# Patient Record
Sex: FEMALE | Race: WHITE
[De-identification: names, ages, dates, MRNs, and addresses within clinical notes are randomized per-mention and may not be internally consistent; named-entity substitution may affect disease eponyms.]

---

## 2017-07-25 ENCOUNTER — HOSPITAL ENCOUNTER (OUTPATIENT)
Dept: HOSPITAL 61 - SURG | Age: 37
Discharge: HOME | End: 2017-07-25
Attending: SURGERY
Payer: COMMERCIAL

## 2017-07-25 VITALS — SYSTOLIC BLOOD PRESSURE: 165 MMHG | DIASTOLIC BLOOD PRESSURE: 97 MMHG

## 2017-07-25 DIAGNOSIS — L02.415: Primary | ICD-10-CM

## 2017-07-25 DIAGNOSIS — E11.9: ICD-10-CM

## 2017-07-25 DIAGNOSIS — M19.90: ICD-10-CM

## 2017-07-25 DIAGNOSIS — I10: ICD-10-CM

## 2017-07-25 DIAGNOSIS — E66.9: ICD-10-CM

## 2017-07-25 DIAGNOSIS — Z86.39: ICD-10-CM

## 2017-07-25 LAB
NEG OBC UR: (no result)
POS OBC UR: (no result)

## 2017-07-25 PROCEDURE — 27301 DRAIN THIGH/KNEE LESION: CPT

## 2017-07-25 PROCEDURE — S0028 INJECTION, FAMOTIDINE, 20 MG: HCPCS

## 2017-07-25 PROCEDURE — 81025 URINE PREGNANCY TEST: CPT

## 2017-07-25 PROCEDURE — 87205 SMEAR GRAM STAIN: CPT

## 2017-07-25 PROCEDURE — 82962 GLUCOSE BLOOD TEST: CPT

## 2017-07-25 NOTE — DISCH
DISCHARGE INSTRUCTIONS


Condition on Discharge


Condition on Discharge:  Stable





Activity After Discharge


Activity Instructions for Disc:  Activity as tolerated


Other activity instructions:  Remove packing in 24 hours





Diet after Discharge


Diet after Discharge:  Regular





Contacting the DRVarghese after DC


Call your doctor for:  If your condition worsens





Follow-Up


Follow up with:  Dr Larsen in 1 week











YISSEL LARSEN MD Jul 25, 2017 16:12

## 2017-07-25 NOTE — PDOC
BRIEF OPERATIVE NOTE


Date:  Jul 25, 2017


Pre-Op Diagnosis


Right thigh abscess


Post-Op Diagnosis


Same


Procedure Performed


I&D


Surgeon


Francisco


Anesthesia Type:  General


Blood Loss


30ml


Specimens Obtained


Cultures


Findings


as above


Complications


None











YISSEL LARSEN MD Jul 25, 2017 16:11

## 2017-07-26 NOTE — OP
DATE OF SURGERY:  07/25/2017



PREOPERATIVE DIAGNOSIS:  Right thigh abscess.



POSTOPERATIVE DIAGNOSIS:  Right thigh abscess.



PROCEDURE:  Incision and drainage of right abscess.



SURGEON:  Dannie Larsen M.D.



INDICATIONS:  The patient is a 37-year-old morbidly obese female with an abscess

of her right thigh.  Procedure of incision and drainage was explained to the

patient in detail.  Risks, benefits were also discussed including bleeding,

infection.  Alternatives of this procedure were also discussed with the patient

who seemed to understand and gave verbal and written consent to have the

procedure performed.



DESCRIPTION OF PROCEDURE:  The patient was taken to the operating room and

placed in the supine position.  General anesthesia was initiated.  Once the

patient was asleep and intubated, she was placed in low lithotomy and her groin

and right thigh were prepped and draped in usual sterile fashion using Betadine

scrub and solution.  An area over the abscess was incised with a 10 blade

scalpel, was carried down through subcutaneous tissue down to the abscess

cavity.  There was only about 10 mL of purulent material expressed.  It was not

very deep.  There was one little area that traveled about 2 cm deeper.  This all

was irrigated with 500 mL of normal saline and then packed with half inch

iodoform Nu Gauze.  The patient was awakened, extubated in the operating room,

taken to recovery in stable condition.  All sponge, instrument counts listed as

correct.  Estimated blood loss 30 mL.

 



______________________________

DANNIE LARSEN MD



DR:  CRISTINO/poli  JOB#:  [id number] / [id number]

DD:  07/25/2017 16:10  DT:  07/25/2017 22:19

## 2017-07-28 ENCOUNTER — HOSPITAL ENCOUNTER (INPATIENT)
Dept: HOSPITAL 61 - ER | Age: 37
LOS: 6 days | Discharge: HOME HEALTH | DRG: 854 | End: 2017-08-03
Attending: INTERNAL MEDICINE | Admitting: INTERNAL MEDICINE
Payer: COMMERCIAL

## 2017-07-28 VITALS — SYSTOLIC BLOOD PRESSURE: 144 MMHG | DIASTOLIC BLOOD PRESSURE: 84 MMHG

## 2017-07-28 VITALS — SYSTOLIC BLOOD PRESSURE: 139 MMHG | DIASTOLIC BLOOD PRESSURE: 80 MMHG

## 2017-07-28 VITALS — BODY MASS INDEX: 50.02 KG/M2 | WEIGHT: 293 LBS | HEIGHT: 64 IN

## 2017-07-28 VITALS — DIASTOLIC BLOOD PRESSURE: 80 MMHG | SYSTOLIC BLOOD PRESSURE: 139 MMHG

## 2017-07-28 DIAGNOSIS — E66.01: ICD-10-CM

## 2017-07-28 DIAGNOSIS — E87.6: ICD-10-CM

## 2017-07-28 DIAGNOSIS — L02.214: ICD-10-CM

## 2017-07-28 DIAGNOSIS — I10: ICD-10-CM

## 2017-07-28 DIAGNOSIS — N93.8: ICD-10-CM

## 2017-07-28 DIAGNOSIS — A41.9: Primary | ICD-10-CM

## 2017-07-28 DIAGNOSIS — M19.90: ICD-10-CM

## 2017-07-28 DIAGNOSIS — E11.65: ICD-10-CM

## 2017-07-28 DIAGNOSIS — Z72.89: ICD-10-CM

## 2017-07-28 DIAGNOSIS — Z83.3: ICD-10-CM

## 2017-07-28 DIAGNOSIS — E87.1: ICD-10-CM

## 2017-07-28 LAB
ANION GAP SERPL CALC-SCNC: 8 MMOL/L (ref 6–14)
BASOPHILS # BLD AUTO: 0.2 X10^3/UL (ref 0–0.2)
BASOPHILS NFR BLD: 1 % (ref 0–3)
BUN SERPL-MCNC: 11 MG/DL (ref 7–20)
CALCIUM SERPL-MCNC: 8.6 MG/DL (ref 8.5–10.1)
CHLORIDE SERPL-SCNC: 100 MMOL/L (ref 98–107)
CO2 SERPL-SCNC: 26 MMOL/L (ref 21–32)
CREAT SERPL-MCNC: 0.9 MG/DL (ref 0.6–1)
CRP SERPL-MCNC: 84.5 MG/L (ref 0–3.3)
EOSINOPHIL NFR BLD: 2 % (ref 0–3)
ERYTHROCYTE [DISTWIDTH] IN BLOOD BY AUTOMATED COUNT: 15.3 % (ref 11.5–14.5)
GFR SERPLBLD BASED ON 1.73 SQ M-ARVRAT: 70.5 ML/MIN
GLUCOSE SERPL-MCNC: 369 MG/DL (ref 70–99)
HCT VFR BLD CALC: 32.3 % (ref 36–47)
HGB BLD-MCNC: 10.3 G/DL (ref 12–15.5)
LYMPHOCYTES # BLD: 4 X10^3/UL (ref 1–4.8)
LYMPHOCYTES NFR BLD AUTO: 27 % (ref 24–48)
MCH RBC QN AUTO: 25 PG (ref 25–35)
MCHC RBC AUTO-ENTMCNC: 32 G/DL (ref 31–37)
MCV RBC AUTO: 80 FL (ref 79–100)
MONOCYTES NFR BLD: 6 % (ref 0–9)
NEUTROPHILS NFR BLD AUTO: 64 % (ref 31–73)
PLATELET # BLD AUTO: 418 X10^3/UL (ref 140–400)
POTASSIUM SERPL-SCNC: 3.9 MMOL/L (ref 3.5–5.1)
RBC # BLD AUTO: 4.04 X10^6/UL (ref 3.5–5.4)
SODIUM SERPL-SCNC: 134 MMOL/L (ref 136–145)
WBC # BLD AUTO: 14.7 X10^3/UL (ref 4–11)

## 2017-07-28 PROCEDURE — 87071 CULTURE AEROBIC QUANT OTHER: CPT

## 2017-07-28 PROCEDURE — 96365 THER/PROPH/DIAG IV INF INIT: CPT

## 2017-07-28 PROCEDURE — 87075 CULTR BACTERIA EXCEPT BLOOD: CPT

## 2017-07-28 PROCEDURE — 94250: CPT

## 2017-07-28 PROCEDURE — 87205 SMEAR GRAM STAIN: CPT

## 2017-07-28 PROCEDURE — 88304 TISSUE EXAM BY PATHOLOGIST: CPT

## 2017-07-28 PROCEDURE — 85027 COMPLETE CBC AUTOMATED: CPT

## 2017-07-28 PROCEDURE — 76856 US EXAM PELVIC COMPLETE: CPT

## 2017-07-28 PROCEDURE — 85651 RBC SED RATE NONAUTOMATED: CPT

## 2017-07-28 PROCEDURE — 94640 AIRWAY INHALATION TREATMENT: CPT

## 2017-07-28 PROCEDURE — 74177 CT ABD & PELVIS W/CONTRAST: CPT

## 2017-07-28 PROCEDURE — 82962 GLUCOSE BLOOD TEST: CPT

## 2017-07-28 PROCEDURE — 36415 COLL VENOUS BLD VENIPUNCTURE: CPT

## 2017-07-28 PROCEDURE — 80048 BASIC METABOLIC PNL TOTAL CA: CPT

## 2017-07-28 PROCEDURE — 96361 HYDRATE IV INFUSION ADD-ON: CPT

## 2017-07-28 PROCEDURE — 86140 C-REACTIVE PROTEIN: CPT

## 2017-07-28 PROCEDURE — 83036 HEMOGLOBIN GLYCOSYLATED A1C: CPT

## 2017-07-28 RX ADMIN — VANCOMYCIN HYDROCHLORIDE PRN EACH: 1 INJECTION, POWDER, LYOPHILIZED, FOR SOLUTION INTRAVENOUS at 21:37

## 2017-07-28 RX ADMIN — BACITRACIN SCH MLS/HR: 5000 INJECTION, POWDER, FOR SOLUTION INTRAMUSCULAR at 20:44

## 2017-07-28 NOTE — PHYS DOC
Past Medical History


Past Medical History:  Diabetes-Type II, Hypertension


Past Surgical History:  Other


Additional Past Surgical Histo:  unknown "female part" surgery; I&D


Alcohol Use:  Occasionally


Drug Use:  None





Adult General


Chief Complaint


Chief Complaint:  ABSCESS





HPI


HPI


37-year-old female presenting to the emergency department with a abscess with 

drainage in the right groin. She recently had Dr. Singh do an incision and 

drainage on the patient with packing which was recently removed in a different 

abscess in a similar location. Intermittent throbbing and without alleviating 

or exacerbating factors.





Review of systems is negative for chest pain shortness of breath fevers chills 

nausea vomiting. All other review of systems is negative unless otherwise noted 

in history of present illness.





ED course: 37-year-old female presenting to the emergency department with 

development of an additional abscess near recently drained abscess that was 

extremely large on physical examination. Given the depth of the abscess and the 

size of the wound, I deferred drainage to our surgeon who I placed 

consultation. The patient also got antibiotics and was subsequent admitted the 

hospital for further evaluation workup and care.





Review of Systems


Review of Systems


SEE ABOVE.





Current Medications


Current Medications





Current Medications








 Medications


  (Trade)  Dose


 Ordered  Sig/Kavitha  Start Time


 Stop Time Status Last Admin


Dose Admin


 


 Morphine Sulfate  2 mg  PRN Q2HR  PRN  7/28/17 20:15


 7/29/17 20:14   


 


 


 Ondansetron HCl


  (Zofran)  4 mg  PRN Q8HRS  PRN  7/28/17 20:15


 7/29/17 20:14   


 


 


 Sodium Chloride  1,000 ml @ 


 100 mls/hr  Q10H  7/28/17 20:13


 7/29/17 20:12   


 


 


 Vancomycin HCl


  (Vanco Per


 Pharmacy)  1 each  PRN DAILY  PRN  7/28/17 20:15


   UNV  


 


 


 Vancomycin HCl 2


 gm/Sodium Chloride  500 ml @ 


 250 mls/hr  1X  ONCE  7/28/17 21:00


 7/28/17 22:59   


 











Allergies


Allergies





Allergies








Coded Allergies Type Severity Reaction Last Updated Verified


 


  No Known Drug Allergies    7/25/17 No











Physical Exam


Physical Exam


SEE ABOVE





Constitutional: Well developed, well nourished, no acute distress, non-toxic 

appearance. []


HENT: Normocephalic, atraumatic, bilateral external ears normal, oropharynx 

moist, no oral exudates, nose normal. []


Eyes: PERRLA, EOMI, conjunctiva normal, no discharge. [] 


Neck: Normal range of motion, no tenderness, supple, no stridor. [] 


Cardiovascular:Heart rate regular rhythm, no murmur []


Lungs & Thorax:  Bilateral breath sounds clear to auscultation []


Abdomen: Bowel sounds normal, soft, no tenderness, no masses, no pulsatile 

masses. [] 


Skin: SEE ABOVE. Draining wound in the right groin with large fluctuant mass.


Back: No tenderness, no CVA tenderness. [] 


Extremities: No tenderness, no cyanosis, no clubbing, ROM intact, no edema. [] 


Neurologic: Alert and oriented X 3, normal motor function, normal sensory 

function, no focal deficits noted. []


Psychologic: Affect normal, judgement normal, mood normal. []





Current Patient Data


Vital Signs





 Vital Signs








  Date Time  Temp Pulse Resp B/P (MAP) Pulse Ox O2 Delivery O2 Flow Rate FiO2


 


7/28/17 18:57 98.5 109 20 158/92 (114) 97 Room Air  





 98.5       








Lab Values





 Laboratory Tests








Test


  7/28/17


19:15


 


White Blood Count


  14.7 x10^3/uL


(4.0-11.0)  H


 


Red Blood Count


  4.04 x10^6/uL


(3.50-5.40)


 


Hemoglobin


  10.3 g/dL


(12.0-15.5)  L


 


Hematocrit


  32.3 %


(36.0-47.0)  L


 


Mean Corpuscular Volume


  80 fL ()


 


 


Mean Corpuscular Hemoglobin 25 pg (25-35)  


 


Mean Corpuscular Hemoglobin


Concent 32 g/dL


(31-37)


 


Red Cell Distribution Width


  15.3 %


(11.5-14.5)  H


 


Platelet Count


  418 x10^3/uL


(140-400)  H


 


Neutrophils (%) (Auto) 64 % (31-73)  


 


Lymphocytes (%) (Auto) 27 % (24-48)  


 


Monocytes (%) (Auto) 6 % (0-9)  


 


Eosinophils (%) (Auto) 2 % (0-3)  


 


Basophils (%) (Auto) 1 % (0-3)  


 


Neutrophils # (Auto)


  9.4 x10^3uL


(1.8-7.7)  H


 


Lymphocytes # (Auto)


  4.0 x10^3/uL


(1.0-4.8)


 


Monocytes # (Auto)


  0.8 x10^3/uL


(0.0-1.1)


 


Eosinophils # (Auto)


  0.3 x10^3/uL


(0.0-0.7)


 


Basophils # (Auto)


  0.2 x10^3/uL


(0.0-0.2)


 


Sodium Level


  134 mmol/L


(136-145)  L


 


Potassium Level


  3.9 mmol/L


(3.5-5.1)


 


Chloride Level


  100 mmol/L


()


 


Carbon Dioxide Level


  26 mmol/L


(21-32)


 


Anion Gap 8 (6-14)  


 


Blood Urea Nitrogen


  11 mg/dL


(7-20)


 


Creatinine


  0.9 mg/dL


(0.6-1.0)


 


Estimated GFR


(Cockcroft-Gault) 70.5  


 


 


Glucose Level


  369 mg/dL


(70-99)  H


 


Calcium Level


  8.6 mg/dL


(8.5-10.1)


 


C-Reactive Protein,


Quantitative 84.5 mg/L


(0-3.3)  H





 Laboratory Tests


7/28/17 19:15








 Laboratory Tests


7/28/17 19:15














EKG


EKG


[]





Radiology/Procedures


Radiology/Procedures


[]





Course & Med Decision Making


Course & Med Decision Making


Pertinent Labs and Imaging studies reviewed. (See chart for details)





[]





Dragon Disclaimer


Dragon Disclaimer


This electronic medical record was generated, in whole or in part, using a 

voice recognition dictation system.





Departure


Departure


Impression:  


 Primary Impression:  


 Abscess of right thigh


Disposition:  09 ADMITTED AS INPATIENT


Admitting Physician:  Lisa Coffey


Condition:  STABLE


Referrals:  


TED HERRING (PCP)











JACKELYN BENJAMIN MD Jul 28, 2017 20:42

## 2017-07-28 NOTE — HP
ADMIT DATE:  07/28/2017



CHIEF COMPLAINT:  Thigh abscess.



HISTORY OF PRESENT ILLNESS:  The patient is a pleasant 37-year-old obese lady,

who has a thigh abscess.  She actually had drainage few days ago.  Now, it is a

little more proximal to larger ____ size.  I have discussed the case with the ER

physician.  We are going to start IV antibiotics and consult General Surgery.



PAST MEDICAL HISTORY:  Hypertension, diabetes, obesity, I and D of the previous

thigh abscess some type of "female surgery."



ALLERGIES:  None.



FAMILY HISTORY:  Diabetes.



SOCIAL HISTORY:  She does not drink, smoke or take drugs.



MEDICATIONS:  Reviewed, please refer to the MRAD.



REVIEW OF SYSTEMS:

GENERAL:  No history of weight change, weakness or fevers.

SKIN:  No bruising, hair changes or rashes.  The patient complains of left thigh

abscess.

EYES:  No blurred, double or loss of vision.

NOSE AND THROAT:  No history of nosebleeds, hoarseness or sore throat.

HEART:  No history of palpitations, chest pain or shortness of breath on

exertion.

LUNGS:  Denies cough, hemoptysis, wheezing or shortness of breath.

GASTROINTESTINAL:  Denies changes in appetite, nausea, vomiting, diarrhea or

constipation.

GENITOURINARY:  No history of frequency, urgency, hesitancy or nocturia.

NEUROLOGIC:  Denies history of numbness, tingling, tremor or weakness.

PSYCHIATRIC:  No history of panic, anxiety or depression.

ENDOCRINE:  No history of heat or cold intolerance, polyuria or polydipsia.

EXTREMITIES:  Denies muscle weakness, joint pain, pain on walking or stiffness.



PHYSICAL EXAMINATION:

VITAL SIGNS:  Temperature afebrile, pulse 68, respirations 21, blood pressure

113/80.

GENERAL:  She is alert, cooperative, ____.

HEART:  Normal S1, S2.

LUNGS:  Clear.

ABDOMEN:  Soft, positive bowel sounds, obese.

EXTREMITIES:  The right thigh has a large abscess with some drains.

ENDOCRINE:  No thyromegaly.

LYMPHATICS:  No cervical nodes.

HEMATOPOIETIC:  No bruising.



LABORATORY DATA:  White count 15, hemoglobin 10, platelets 418.  Electrolytes: 

Sodium 134, potassium 3.9, chloride 100, bicarbonate 26, BUN 11, creatinine 0.9,

glucose 369, C-reactive protein 85.



ASSESSMENT AND PLAN:  Recurrent thigh abscess with leukocytosis and

hyponatremia.  The patient has been admitted.  We will start IV antibiotics. 

Consult ID.  Consult General Surgery, wound care, PT, OT.  Resume home medicines

and frequent labs.

 



______________________________

BRITTANY YOO DO



DR:  DARLINE/poli  JOB#:  [id number] / [id number]

DD:  07/28/2017 23:30  DT:  07/28/2017 23:47

## 2017-07-29 VITALS — DIASTOLIC BLOOD PRESSURE: 86 MMHG | SYSTOLIC BLOOD PRESSURE: 149 MMHG

## 2017-07-29 VITALS — DIASTOLIC BLOOD PRESSURE: 82 MMHG | SYSTOLIC BLOOD PRESSURE: 145 MMHG

## 2017-07-29 VITALS — SYSTOLIC BLOOD PRESSURE: 130 MMHG | DIASTOLIC BLOOD PRESSURE: 72 MMHG

## 2017-07-29 VITALS — SYSTOLIC BLOOD PRESSURE: 156 MMHG | DIASTOLIC BLOOD PRESSURE: 99 MMHG

## 2017-07-29 VITALS — DIASTOLIC BLOOD PRESSURE: 78 MMHG | SYSTOLIC BLOOD PRESSURE: 142 MMHG

## 2017-07-29 VITALS — DIASTOLIC BLOOD PRESSURE: 93 MMHG | SYSTOLIC BLOOD PRESSURE: 157 MMHG

## 2017-07-29 VITALS — SYSTOLIC BLOOD PRESSURE: 154 MMHG | DIASTOLIC BLOOD PRESSURE: 86 MMHG

## 2017-07-29 VITALS — SYSTOLIC BLOOD PRESSURE: 141 MMHG | DIASTOLIC BLOOD PRESSURE: 81 MMHG

## 2017-07-29 LAB
ANION GAP SERPL CALC-SCNC: 10 MMOL/L (ref 6–14)
BASOPHILS # BLD AUTO: 0 X10^3/UL (ref 0–0.2)
BASOPHILS NFR BLD: 0 % (ref 0–3)
BUN SERPL-MCNC: 9 MG/DL (ref 7–20)
CALCIUM SERPL-MCNC: 8.6 MG/DL (ref 8.5–10.1)
CHLORIDE SERPL-SCNC: 101 MMOL/L (ref 98–107)
CO2 SERPL-SCNC: 26 MMOL/L (ref 21–32)
CREAT SERPL-MCNC: 0.8 MG/DL (ref 0.6–1)
EOSINOPHIL NFR BLD: 2 % (ref 0–3)
ERYTHROCYTE [DISTWIDTH] IN BLOOD BY AUTOMATED COUNT: 15.4 % (ref 11.5–14.5)
GFR SERPLBLD BASED ON 1.73 SQ M-ARVRAT: 80.7 ML/MIN
GLUCOSE SERPL-MCNC: 361 MG/DL (ref 70–99)
HCT VFR BLD CALC: 30.1 % (ref 36–47)
HGB BLD-MCNC: 9.5 G/DL (ref 12–15.5)
LYMPHOCYTES # BLD: 3.1 X10^3/UL (ref 1–4.8)
LYMPHOCYTES NFR BLD AUTO: 24 % (ref 24–48)
MCH RBC QN AUTO: 25 PG (ref 25–35)
MCHC RBC AUTO-ENTMCNC: 32 G/DL (ref 31–37)
MCV RBC AUTO: 81 FL (ref 79–100)
MONOCYTES NFR BLD: 7 % (ref 0–9)
NEUTROPHILS NFR BLD AUTO: 66 % (ref 31–73)
PLATELET # BLD AUTO: 395 X10^3/UL (ref 140–400)
POTASSIUM SERPL-SCNC: 3.9 MMOL/L (ref 3.5–5.1)
RBC # BLD AUTO: 3.73 X10^6/UL (ref 3.5–5.4)
SODIUM SERPL-SCNC: 137 MMOL/L (ref 136–145)
WBC # BLD AUTO: 12.5 X10^3/UL (ref 4–11)

## 2017-07-29 PROCEDURE — 0JBL0ZZ EXCISION OF RIGHT UPPER LEG SUBCUTANEOUS TISSUE AND FASCIA, OPEN APPROACH: ICD-10-PCS | Performed by: SURGERY

## 2017-07-29 RX ADMIN — BACITRACIN SCH MLS/HR: 5000 INJECTION, POWDER, FOR SOLUTION INTRAMUSCULAR at 05:37

## 2017-07-29 RX ADMIN — MORPHINE SULFATE PRN MG: 2 INJECTION, SOLUTION INTRAMUSCULAR; INTRAVENOUS at 19:50

## 2017-07-29 RX ADMIN — FENTANYL CITRATE PRN MCG: 50 INJECTION INTRAMUSCULAR; INTRAVENOUS at 19:50

## 2017-07-29 RX ADMIN — BACITRACIN SCH MLS/HR: 5000 INJECTION, POWDER, FOR SOLUTION INTRAMUSCULAR at 12:07

## 2017-07-29 RX ADMIN — PIPERACILLIN SODIUM AND TAZOBACTAM SODIUM SCH MLS/HR: 3; .375 INJECTION, POWDER, LYOPHILIZED, FOR SOLUTION INTRAVENOUS at 23:29

## 2017-07-29 RX ADMIN — MORPHINE SULFATE PRN MG: 2 INJECTION, SOLUTION INTRAMUSCULAR; INTRAVENOUS at 19:40

## 2017-07-29 RX ADMIN — FENTANYL CITRATE PRN MCG: 50 INJECTION INTRAMUSCULAR; INTRAVENOUS at 20:04

## 2017-07-29 RX ADMIN — PIPERACILLIN SODIUM AND TAZOBACTAM SODIUM SCH MLS/HR: 3; .375 INJECTION, POWDER, LYOPHILIZED, FOR SOLUTION INTRAVENOUS at 18:14

## 2017-07-29 RX ADMIN — VANCOMYCIN HYDROCHLORIDE PRN EACH: 1 INJECTION, POWDER, LYOPHILIZED, FOR SOLUTION INTRAVENOUS at 08:57

## 2017-07-29 RX ADMIN — HYDROCODONE BITARTRATE AND ACETAMINOPHEN PRN TAB: 5; 325 TABLET ORAL at 21:30

## 2017-07-29 RX ADMIN — SENNOSIDES AND DOCUSATE SODIUM SCH TAB: 8.6; 5 TABLET ORAL at 21:26

## 2017-07-29 RX ADMIN — INSULIN ASPART SCH UNITS: 100 INJECTION, SOLUTION INTRAVENOUS; SUBCUTANEOUS at 17:08

## 2017-07-29 RX ADMIN — INSULIN ASPART SCH UNITS: 100 INJECTION, SOLUTION INTRAVENOUS; SUBCUTANEOUS at 08:12

## 2017-07-29 RX ADMIN — PIPERACILLIN SODIUM AND TAZOBACTAM SODIUM SCH MLS/HR: 3; .375 INJECTION, POWDER, LYOPHILIZED, FOR SOLUTION INTRAVENOUS at 12:07

## 2017-07-29 RX ADMIN — INSULIN ASPART SCH UNITS: 100 INJECTION, SOLUTION INTRAVENOUS; SUBCUTANEOUS at 12:13

## 2017-07-29 NOTE — ACF
Admission Forms Criteria


                    WOUND COMPLICATIONS





Clinical Indications for Inpatient Care





                                                             (Place 'X' for any 

and all applicable criteria):


 


Ongoing inpatient care may be indicated for wound complications with ANY ONE of 

the following (1) (15):


[X]I.   Infection with ANY ONE of the following(32)(33):


        [ ]a)  Temperature greater than 38.5 C (101.3 F)         


        [ ]b)  Evidence of tissue necrosis 


        [ ]c)  Erythema diameter expanding around wound despite treatment


        [ ]d)  Mental status changes           


        [ ]e)  Dehydration             


        [ ]f)   Bacteremia


        [X]g)  Hemodynamic instability            


        [ ]h)  Suspected necrotizing fasciitis


        [ ]i)   Rapidly spreading lesions          


        [ ]j)   High-risk location (eg, perineum, sternum, orbit)


        [ ]k)  High-risk coexisting clinical condition as indicated by ANY ONE 

of the following:


                [ ]i)       Poorly controlled diabetes              


                [ ]ii)      Cirrhosis


                [ ]iii)     Renal failure                                   


                [ ]iv)     Neutropenia


                [ ] v)     Asplenia                                        


                [ ]vi)     Immunosuppression (eg, AIDS, chronic corticosteroid 

use)                   


                [ ]viii)   Other high-risk medical comorbidities


[ ] II.   Dehiscence requiring frequent monitoring or immediate treatment


[ ] III.  Hematoma with ANY ONE of the following:


         [ ]a)  Hemodynamic instability or acute anemia due to rapid 

development of hematoma 


         [ ]b)  Neck hematoma causing airway compression


         [ ]c)  Retroperitoneal hematoma             


         [ ]d)  Uncontrolled coagulopathy


[ ]IV.  Seroma with evidence of secondary infection and requirement for IV 

antibiotics [D](31)


[ ]V.   Pain that cannot be managed at lower level of care











Extended stay beyond goal length of stay for primary condition may be needed 

until ALL 


of the following are present(1)(33)(34):


[ ]a)   Afebrile or fever resolving       


[ ]b)   Hemodynamic stability        


[ ]c)   Pain resolving


[ ]d)   Wound closed, continuity adequately restored, or wound manageable at 

lower level of care


[ ]e)   No drain needed or drain care manageable at lower level of care


[ ]f)    Wound hematoma or seroma resolving


[ ]g)   Antibiotics not needed or regimen manageable at lower level of care(38)


[ ]h)   Dressing care manageable at lower level of care


[ ]i)    Coagulopathy absent, resolved, or treatable at lower level of care 


[ ]j)    Medical comorbidities resolved or treatable at lower level of care














The original [facility] content created by [employer] has been revised. 


The portions of the content which have been revised are identified through the 

use of italic text or in bold, and Bonnie Michael 


has neither reviewed nor approved the modified material. All other unmodified 

content is copyright  [facility].





Please see references footnoted in the original [facility] [employer] edition 

2016


Admission Criteria Met?:  Yes











DAVID MI Jul 29, 2017 05:39

## 2017-07-29 NOTE — PDOC4
OPERATIVE NOTE


Date:


Date:  Jul 29, 2017





Pre-Op Diagnosis:


Right groin abscess





Post-Op Diagnosis:


same





Procedure Performed:


Excisional debridement of right groin and incision and drainage





Surgeon:


Quinton Ramos





Anesthesia Type:


General





Blood Loss:


minimal





Specimans Obtained:


right groin necrotic skin





Findings:


Necrotic right skin 3 cm diameter with large underlying, foul smelling abscess





Complications:


none





Operative Note:


After obtaining informed consent, patient was induced under anesthetic.  

Patient was prepped in the usual fashion over the right groin area.  Previous 

area of I and D with foul smelling purulent drainage.  3 cm area of necrotic 

skin tissue noted laterally.  An excisional debridement was performed of this 

area using cautery.  This was sent to pathology.  Cultures were obtained.  This 

went down to the adipose tissue of the thigh.  Large amount of necrotic tissue 

was debrided.  A penrose drain was brought through the previous wound and the 

excised area and secured with a 3 0 nylon.  The wound was packed with iodoform 

gauze.  Sterile dressing was placed over this.  Patient tolerated procedure 

well and sent to PACU in a stable condition.  All counts were correct, there 

were no immediate complications.











QUINTON RAMOS MD Jul 29, 2017 19:38

## 2017-07-29 NOTE — PDOC
PROGRESS NOTES


Chief Complaint


Chief Complaint


Abscess of right thigh





History of Present Illness


History of Present Illness


Pt resting in bed NAD


Complains of hunger pains but otherwise content





Vitals


Vitals





Vital Signs








  Date Time  Temp Pulse Resp B/P (MAP) Pulse Ox O2 Delivery O2 Flow Rate FiO2


 


7/29/17 11:00 98.2 90 20 145/82 (103) 97 Room Air  





 98.2       











Physical Exam


General:  Alert, Oriented X3, Cooperative, No acute distress


Heart:  Regular rate, Normal S1, Normal S2, No murmurs


Lungs:  Clear, Other (No RRW)


Abdomen:  Soft, No tenderness


Extremities:  No clubbing, No cyanosis


Skin:  No rashes, Other (Right thigh with induration, odor, large central bulla 

with fluctaunce, , ssome purulent drainage, early skin changes to central area 

for necrosis )





Labs


LABS





Laboratory Tests








Test


  7/28/17


19:15 7/29/17


03:05 7/29/17


07:10 7/29/17


10:19


 


White Blood Count


  14.7 x10^3/uL


(4.0-11.0) 12.5 x10^3/uL


(4.0-11.0) 


  


 


 


Red Blood Count


  4.04 x10^6/uL


(3.50-5.40) 3.73 x10^6/uL


(3.50-5.40) 


  


 


 


Hemoglobin


  10.3 g/dL


(12.0-15.5) 9.5 g/dL


(12.0-15.5) 


  


 


 


Hematocrit


  32.3 %


(36.0-47.0) 30.1 %


(36.0-47.0) 


  


 


 


Mean Corpuscular Volume 80 fL ()  81 fL ()   


 


Mean Corpuscular Hemoglobin 25 pg (25-35)  25 pg (25-35)   


 


Mean Corpuscular Hemoglobin


Concent 32 g/dL


(31-37) 32 g/dL


(31-37) 


  


 


 


Red Cell Distribution Width


  15.3 %


(11.5-14.5) 15.4 %


(11.5-14.5) 


  


 


 


Platelet Count


  418 x10^3/uL


(140-400) 395 x10^3/uL


(140-400) 


  


 


 


Neutrophils (%) (Auto) 64 % (31-73)  66 % (31-73)   


 


Lymphocytes (%) (Auto) 27 % (24-48)  24 % (24-48)   


 


Monocytes (%) (Auto) 6 % (0-9)  7 % (0-9)   


 


Eosinophils (%) (Auto) 2 % (0-3)  2 % (0-3)   


 


Basophils (%) (Auto) 1 % (0-3)  0 % (0-3)   


 


Neutrophils # (Auto)


  9.4 x10^3uL


(1.8-7.7) 8.3 x10^3uL


(1.8-7.7) 


  


 


 


Lymphocytes # (Auto)


  4.0 x10^3/uL


(1.0-4.8) 3.1 x10^3/uL


(1.0-4.8) 


  


 


 


Monocytes # (Auto)


  0.8 x10^3/uL


(0.0-1.1) 0.9 x10^3/uL


(0.0-1.1) 


  


 


 


Eosinophils # (Auto)


  0.3 x10^3/uL


(0.0-0.7) 0.2 x10^3/uL


(0.0-0.7) 


  


 


 


Basophils # (Auto)


  0.2 x10^3/uL


(0.0-0.2) 0.0 x10^3/uL


(0.0-0.2) 


  


 


 


Erythrocyte Sedimentation Rate 70 (0-25)    


 


Sodium Level


  134 mmol/L


(136-145) 137 mmol/L


(136-145) 


  


 


 


Potassium Level


  3.9 mmol/L


(3.5-5.1) 3.9 mmol/L


(3.5-5.1) 


  


 


 


Chloride Level


  100 mmol/L


() 101 mmol/L


() 


  


 


 


Carbon Dioxide Level


  26 mmol/L


(21-32) 26 mmol/L


(21-32) 


  


 


 


Anion Gap 8 (6-14)  10 (6-14)   


 


Blood Urea Nitrogen


  11 mg/dL


(7-20) 9 mg/dL (7-20) 


  


  


 


 


Creatinine


  0.9 mg/dL


(0.6-1.0) 0.8 mg/dL


(0.6-1.0) 


  


 


 


Estimated GFR


(Cockcroft-Gault) 70.5 


  80.7 


  


  


 


 


Glucose Level


  369 mg/dL


(70-99) 361 mg/dL


(70-99) 


  


 


 


Calcium Level


  8.6 mg/dL


(8.5-10.1) 8.6 mg/dL


(8.5-10.1) 


  


 


 


C-Reactive Protein,


Quantitative 84.5 mg/L


(0-3.3) 


  


  


 


 


Glucose (Fingerstick)


  


  


  328 mg/dL


(70-99) 298 mg/dL


(70-99)











Review of Systems


Review of Systems


Weakness





Assessment and Plan


Assessmemt and Plan


Abscess of right thigh





Plan:


Awaiting surgery input


Recheck labs


PT/OT


Continue home meds


Continue wound care


Continue antibiotics


Problems:  





Comment


Review of Relevant


I have reviewed the following items lana (where applicable) has been applied.


Labs





Laboratory Tests








Test


  7/28/17


19:15 7/29/17


03:05 7/29/17


07:10 7/29/17


10:19


 


White Blood Count


  14.7 x10^3/uL


(4.0-11.0) 12.5 x10^3/uL


(4.0-11.0) 


  


 


 


Red Blood Count


  4.04 x10^6/uL


(3.50-5.40) 3.73 x10^6/uL


(3.50-5.40) 


  


 


 


Hemoglobin


  10.3 g/dL


(12.0-15.5) 9.5 g/dL


(12.0-15.5) 


  


 


 


Hematocrit


  32.3 %


(36.0-47.0) 30.1 %


(36.0-47.0) 


  


 


 


Mean Corpuscular Volume 80 fL ()  81 fL ()   


 


Mean Corpuscular Hemoglobin 25 pg (25-35)  25 pg (25-35)   


 


Mean Corpuscular Hemoglobin


Concent 32 g/dL


(31-37) 32 g/dL


(31-37) 


  


 


 


Red Cell Distribution Width


  15.3 %


(11.5-14.5) 15.4 %


(11.5-14.5) 


  


 


 


Platelet Count


  418 x10^3/uL


(140-400) 395 x10^3/uL


(140-400) 


  


 


 


Neutrophils (%) (Auto) 64 % (31-73)  66 % (31-73)   


 


Lymphocytes (%) (Auto) 27 % (24-48)  24 % (24-48)   


 


Monocytes (%) (Auto) 6 % (0-9)  7 % (0-9)   


 


Eosinophils (%) (Auto) 2 % (0-3)  2 % (0-3)   


 


Basophils (%) (Auto) 1 % (0-3)  0 % (0-3)   


 


Neutrophils # (Auto)


  9.4 x10^3uL


(1.8-7.7) 8.3 x10^3uL


(1.8-7.7) 


  


 


 


Lymphocytes # (Auto)


  4.0 x10^3/uL


(1.0-4.8) 3.1 x10^3/uL


(1.0-4.8) 


  


 


 


Monocytes # (Auto)


  0.8 x10^3/uL


(0.0-1.1) 0.9 x10^3/uL


(0.0-1.1) 


  


 


 


Eosinophils # (Auto)


  0.3 x10^3/uL


(0.0-0.7) 0.2 x10^3/uL


(0.0-0.7) 


  


 


 


Basophils # (Auto)


  0.2 x10^3/uL


(0.0-0.2) 0.0 x10^3/uL


(0.0-0.2) 


  


 


 


Erythrocyte Sedimentation Rate 70 (0-25)    


 


Sodium Level


  134 mmol/L


(136-145) 137 mmol/L


(136-145) 


  


 


 


Potassium Level


  3.9 mmol/L


(3.5-5.1) 3.9 mmol/L


(3.5-5.1) 


  


 


 


Chloride Level


  100 mmol/L


() 101 mmol/L


() 


  


 


 


Carbon Dioxide Level


  26 mmol/L


(21-32) 26 mmol/L


(21-32) 


  


 


 


Anion Gap 8 (6-14)  10 (6-14)   


 


Blood Urea Nitrogen


  11 mg/dL


(7-20) 9 mg/dL (7-20) 


  


  


 


 


Creatinine


  0.9 mg/dL


(0.6-1.0) 0.8 mg/dL


(0.6-1.0) 


  


 


 


Estimated GFR


(Cockcroft-Gault) 70.5 


  80.7 


  


  


 


 


Glucose Level


  369 mg/dL


(70-99) 361 mg/dL


(70-99) 


  


 


 


Calcium Level


  8.6 mg/dL


(8.5-10.1) 8.6 mg/dL


(8.5-10.1) 


  


 


 


C-Reactive Protein,


Quantitative 84.5 mg/L


(0-3.3) 


  


  


 


 


Glucose (Fingerstick)


  


  


  328 mg/dL


(70-99) 298 mg/dL


(70-99)








Laboratory Tests








Test


  7/28/17


19:15 7/29/17


03:05 7/29/17


07:10 7/29/17


10:19


 


White Blood Count


  14.7 x10^3/uL


(4.0-11.0) 12.5 x10^3/uL


(4.0-11.0) 


  


 


 


Red Blood Count


  4.04 x10^6/uL


(3.50-5.40) 3.73 x10^6/uL


(3.50-5.40) 


  


 


 


Hemoglobin


  10.3 g/dL


(12.0-15.5) 9.5 g/dL


(12.0-15.5) 


  


 


 


Hematocrit


  32.3 %


(36.0-47.0) 30.1 %


(36.0-47.0) 


  


 


 


Mean Corpuscular Volume 80 fL ()  81 fL ()   


 


Mean Corpuscular Hemoglobin 25 pg (25-35)  25 pg (25-35)   


 


Mean Corpuscular Hemoglobin


Concent 32 g/dL


(31-37) 32 g/dL


(31-37) 


  


 


 


Red Cell Distribution Width


  15.3 %


(11.5-14.5) 15.4 %


(11.5-14.5) 


  


 


 


Platelet Count


  418 x10^3/uL


(140-400) 395 x10^3/uL


(140-400) 


  


 


 


Neutrophils (%) (Auto) 64 % (31-73)  66 % (31-73)   


 


Lymphocytes (%) (Auto) 27 % (24-48)  24 % (24-48)   


 


Monocytes (%) (Auto) 6 % (0-9)  7 % (0-9)   


 


Eosinophils (%) (Auto) 2 % (0-3)  2 % (0-3)   


 


Basophils (%) (Auto) 1 % (0-3)  0 % (0-3)   


 


Neutrophils # (Auto)


  9.4 x10^3uL


(1.8-7.7) 8.3 x10^3uL


(1.8-7.7) 


  


 


 


Lymphocytes # (Auto)


  4.0 x10^3/uL


(1.0-4.8) 3.1 x10^3/uL


(1.0-4.8) 


  


 


 


Monocytes # (Auto)


  0.8 x10^3/uL


(0.0-1.1) 0.9 x10^3/uL


(0.0-1.1) 


  


 


 


Eosinophils # (Auto)


  0.3 x10^3/uL


(0.0-0.7) 0.2 x10^3/uL


(0.0-0.7) 


  


 


 


Basophils # (Auto)


  0.2 x10^3/uL


(0.0-0.2) 0.0 x10^3/uL


(0.0-0.2) 


  


 


 


Erythrocyte Sedimentation Rate 70 (0-25)    


 


Sodium Level


  134 mmol/L


(136-145) 137 mmol/L


(136-145) 


  


 


 


Potassium Level


  3.9 mmol/L


(3.5-5.1) 3.9 mmol/L


(3.5-5.1) 


  


 


 


Chloride Level


  100 mmol/L


() 101 mmol/L


() 


  


 


 


Carbon Dioxide Level


  26 mmol/L


(21-32) 26 mmol/L


(21-32) 


  


 


 


Anion Gap 8 (6-14)  10 (6-14)   


 


Blood Urea Nitrogen


  11 mg/dL


(7-20) 9 mg/dL (7-20) 


  


  


 


 


Creatinine


  0.9 mg/dL


(0.6-1.0) 0.8 mg/dL


(0.6-1.0) 


  


 


 


Estimated GFR


(Cockcroft-Gault) 70.5 


  80.7 


  


  


 


 


Glucose Level


  369 mg/dL


(70-99) 361 mg/dL


(70-99) 


  


 


 


Calcium Level


  8.6 mg/dL


(8.5-10.1) 8.6 mg/dL


(8.5-10.1) 


  


 


 


C-Reactive Protein,


Quantitative 84.5 mg/L


(0-3.3) 


  


  


 


 


Glucose (Fingerstick)


  


  


  328 mg/dL


(70-99) 298 mg/dL


(70-99)








Medications





Current Medications


Sodium Chloride 500 ml @  500 mls/hr 1X  ONCE IV  Last administered on 7/28/ 17at 19:57;  Start 7/28/17 at 20:15;  Stop 7/28/17 at 21:14;  Status DC


Vancomycin HCl (Vanco Per Pharmacy) 1 each PRN DAILY  PRN MC SEE COMMENTS Last 

administered on 7/29/17at 08:57;  Start 7/28/17 at 20:15;  Stop 7/29/17 at 12:34

;  Status DC


Ondansetron HCl (Zofran) 4 mg PRN Q8HRS  PRN IV NAUSEA/VOMITING;  Start 7/28/17 

at 20:15;  Stop 7/29/17 at 20:14


Morphine Sulfate 2 mg PRN Q2HR  PRN IV PAIN;  Start 7/28/17 at 20:15;  Stop 7/29 /17 at 20:14


Sodium Chloride 1,000 ml @  100 mls/hr Q10H IV  Last administered on 7/29/17at 

12:07;  Start 7/28/17 at 20:13;  Stop 7/29/17 at 20:12


Vancomycin HCl 2 gm/Sodium Chloride 500 ml @  250 mls/hr 1X  ONCE IV  Last 

administered on 7/28/17at 20:33;  Start 7/28/17 at 21:00;  Stop 7/28/17 at 22:59

;  Status DC


Insulin Human Regular (NovoLIN R VIAL) 10 unit 1X  ONCE IV ;  Start 7/28/17 at 

21:15;  Stop 7/28/17 at 22:58;  Status DC


Albuterol/ Ipratropium (Duoneb) 3 ml 1X  ONCE NEB  Last administered on 7/28/ 17at 21:57;  Start 7/28/17 at 21:30;  Stop 7/28/17 at 21:31;  Status DC


Vancomycin HCl 1 each 1X  ONCE MC ;  Start 7/29/17 at 20:30;  Stop 7/29/17 at 20

:30;  Status DC


Vancomycin HCl 1.75 gm/Sodium Chloride 500 ml @  250 mls/hr Q8H IV  Last 

administered on 7/29/17at 05:36;  Start 7/29/17 at 05:00;  Stop 7/29/17 at 12:33

;  Status DC


Insulin Aspart (NovoLOG) 0-9 UNITS TIDWMEALS SQ  Last administered on 7/29/17at 

12:13;  Start 7/29/17 at 08:00


Dextrose (Dextrose 50%-Water Syringe) 12.5 gm PRN Q15MIN  PRN IV SEE COMMENTS;  

Start 7/28/17 at 23:00


Insulin Aspart (NovoLOG) 10 units 1X  ONCE SQ  Last administered on 7/28/17at 23

:34;  Start 7/28/17 at 23:15;  Stop 7/28/17 at 23:16;  Status DC


Piperacillin Sod/ Tazobactam Sod 3.375 gm/Sodium Chloride 50 ml @  100 mls/hr 

Q6HRS IV  Last administered on 7/29/17at 12:07;  Start 7/29/17 at 12:00





Active Scripts


Active


Reported


Norco 5-325 Tablet (Acetaminophen/Hydrocodone Bitart) 1 Each Tablet 1-2 Tab PO 

PRN Q4-6HRS PRN


     LAST DOSE GIVEN:


     DATE:


     TIME:


Bactrim Ds Tablet (Sulfamethoxazole/Trimethoprim) 1 Each Tablet 1 Tab PO BID


Aleve (Naproxen Sodium) 220 Mg Capsule 440 Mg PO BID


Vitals/I & O





Vital Sign - Last 24 Hours








 7/28/17 7/28/17 7/28/17 7/28/17





 18:57 20:00 20:16 20:56


 


Temp 98.5   





 98.5   


 


Pulse 109 102 100 103


 


Resp 20 20 20 18


 


B/P (MAP) 158/92 (114) 173/85 (114) 172/82 (112) 176/80 (112)


 


Pulse Ox 97 98 98 97


 


O2 Delivery Room Air Room Air Room Air Room Air


 


    





    





 7/28/17 7/28/17 7/28/17 7/28/17





 21:00 21:30 21:58 23:00


 


Temp 98.3 98.3  98.4





 98.3 98.3  98.4


 


Pulse 104 104  107


 


Resp 20 20  20


 


B/P (MAP) 139/80 (99) 139/80 (99)  144/84 (104)


 


Pulse Ox 94 94 96 95


 


O2 Delivery Room Air Room Air Room Air Room Air


 


    





    





 7/29/17 7/29/17 7/29/17 7/29/17





 01:22 03:30 07:27 08:00


 


Temp   98.5 





   98.5 


 


Pulse   92 


 


Resp   20 


 


B/P (MAP)   156/99 (118) 


 


Pulse Ox   97 


 


O2 Delivery Room Air Room Air Room Air Room Air


 


    





    





 7/29/17   





 11:00   


 


Temp 98.2   





 98.2   


 


Pulse 90   


 


Resp 20   


 


B/P (MAP) 145/82 (103)   


 


Pulse Ox 97   


 


O2 Delivery Room Air   














Intake and Output   


 


 7/28/17 7/28/17 7/29/17





 15:00 23:00 07:00


 


Intake Total  500 ml 1440 ml


 


Output Total   1 ml


 


Balance  500 ml 1439 ml

















BRITTANY YOO III DO Jul 29, 2017 13:36

## 2017-07-29 NOTE — PDOC
Infectious Disease Note


Vital Sign


Vital Signs





Vital Signs








  Date Time  Temp Pulse Resp B/P (MAP) Pulse Ox O2 Delivery O2 Flow Rate FiO2


 


7/29/17 11:00 98.2 90 20 145/82 (103) 97 Room Air  





 98.2       











Labs


Lab





Laboratory Tests








Test


  7/28/17


19:15 7/29/17


03:05 7/29/17


07:10 7/29/17


10:19


 


White Blood Count


  14.7 x10^3/uL


(4.0-11.0) 12.5 x10^3/uL


(4.0-11.0) 


  


 


 


Red Blood Count


  4.04 x10^6/uL


(3.50-5.40) 3.73 x10^6/uL


(3.50-5.40) 


  


 


 


Hemoglobin


  10.3 g/dL


(12.0-15.5) 9.5 g/dL


(12.0-15.5) 


  


 


 


Hematocrit


  32.3 %


(36.0-47.0) 30.1 %


(36.0-47.0) 


  


 


 


Mean Corpuscular Volume 80 fL ()  81 fL ()   


 


Mean Corpuscular Hemoglobin 25 pg (25-35)  25 pg (25-35)   


 


Mean Corpuscular Hemoglobin


Concent 32 g/dL


(31-37) 32 g/dL


(31-37) 


  


 


 


Red Cell Distribution Width


  15.3 %


(11.5-14.5) 15.4 %


(11.5-14.5) 


  


 


 


Platelet Count


  418 x10^3/uL


(140-400) 395 x10^3/uL


(140-400) 


  


 


 


Neutrophils (%) (Auto) 64 % (31-73)  66 % (31-73)   


 


Lymphocytes (%) (Auto) 27 % (24-48)  24 % (24-48)   


 


Monocytes (%) (Auto) 6 % (0-9)  7 % (0-9)   


 


Eosinophils (%) (Auto) 2 % (0-3)  2 % (0-3)   


 


Basophils (%) (Auto) 1 % (0-3)  0 % (0-3)   


 


Neutrophils # (Auto)


  9.4 x10^3uL


(1.8-7.7) 8.3 x10^3uL


(1.8-7.7) 


  


 


 


Lymphocytes # (Auto)


  4.0 x10^3/uL


(1.0-4.8) 3.1 x10^3/uL


(1.0-4.8) 


  


 


 


Monocytes # (Auto)


  0.8 x10^3/uL


(0.0-1.1) 0.9 x10^3/uL


(0.0-1.1) 


  


 


 


Eosinophils # (Auto)


  0.3 x10^3/uL


(0.0-0.7) 0.2 x10^3/uL


(0.0-0.7) 


  


 


 


Basophils # (Auto)


  0.2 x10^3/uL


(0.0-0.2) 0.0 x10^3/uL


(0.0-0.2) 


  


 


 


Erythrocyte Sedimentation Rate 70 (0-25)    


 


Sodium Level


  134 mmol/L


(136-145) 137 mmol/L


(136-145) 


  


 


 


Potassium Level


  3.9 mmol/L


(3.5-5.1) 3.9 mmol/L


(3.5-5.1) 


  


 


 


Chloride Level


  100 mmol/L


() 101 mmol/L


() 


  


 


 


Carbon Dioxide Level


  26 mmol/L


(21-32) 26 mmol/L


(21-32) 


  


 


 


Anion Gap 8 (6-14)  10 (6-14)   


 


Blood Urea Nitrogen


  11 mg/dL


(7-20) 9 mg/dL (7-20) 


  


  


 


 


Creatinine


  0.9 mg/dL


(0.6-1.0) 0.8 mg/dL


(0.6-1.0) 


  


 


 


Estimated GFR


(Cockcroft-Gault) 70.5 


  80.7 


  


  


 


 


Glucose Level


  369 mg/dL


(70-99) 361 mg/dL


(70-99) 


  


 


 


Calcium Level


  8.6 mg/dL


(8.5-10.1) 8.6 mg/dL


(8.5-10.1) 


  


 


 


C-Reactive Protein,


Quantitative 84.5 mg/L


(0-3.3) 


  


  


 


 


Glucose (Fingerstick)


  


  


  328 mg/dL


(70-99) 298 mg/dL


(70-99)











Objective


Assessment


Worsening abscess right groin/thigh area


 -s/p I and D on 7/25. group B Strep, anaerobic cx still pending.


Leukocytosis 


 -h/o pre-op steroids on 7/25 


Diabetes


Super morbid obesity, BMI 62





Plan


Plan of Care


vanc 


will broaden antibiotics to cover for anaerobes as well  


I and D scheduled for later today, intra-op cultures would be appreciated  


Monitor labs





Thank you 


[id number]..   


[id number]


Attending Co-Sign


The patient was seen and interviewed as well as examined at the bedside. The 

chart was reviewed. The case was discussed. Agree with the plan of care.











SHU KAYE Jul 29, 2017 12:05


JEREMIAH CISSE MD Jul 29, 2017 12:32

## 2017-07-29 NOTE — CONS
DATE OF CONSULTATION:  07/29/2017



REFERRING PHYSICIAN:  Dr. Coffey.



REASON FOR CONSULTATION:  Right groin abscess.



HISTORY OF PRESENT ILLNESS:  This patient is a 37-year-old -American

female with a body mass index of 61.8 and a 2-year history of diabetes who

presented with worsening right groin/thigh swelling, pain, drainage and odor. 

She was previously hospitalized on the 25th for abscess, status post I and D. 

At that time, a culture grew beta hemolytic Streptococcus group B with anaerobic

cultures still pending.   She was discharged home on Bactrim. The patient is 
scheduled 

to return to the OR later today for additional debridement.



PAST MEDICAL HISTORY:  Diabetes mellitus, morbid obesity, asthma, arthritis and

hypertension.



PAST SURGICAL HISTORY:  Recent I and D of right groin abscess, 07/25/2017.  GYN

surgery.



FAMILY HISTORY:  Positive for diabetes.



SOCIAL HISTORY:  The patient is employed.  She is single.  Nonsmoker.



ALLERGIES:  No known drug allergies.



MEDICATIONS:  Vancomycin.  Other medications are available and have been

reviewed on the MAR.  Also of note, she did receive a dose of dexamethasone

preop on 07/25/2017 and home medications include Bactrim. 



REVIEW OF SYSTEMS:  The patient denies fevers, chills or sweats.  Denies

headache, nasal/sinus congestion or sore throat.  Denies cough, shortness of air

or wheezing.  Denies chest pain, palpitations or swelling.  Denies nausea or

vomiting.  She had loose stool earlier today.  Denies cramping or bloating. 

Denies a rash.



PHYSICAL EXAMINATION:

GENERAL:  An overweight -American female lying in bed in no apparent

distress.

VITAL SIGNS:  Afebrile.  Stable.

HEENT:  Oral cavity, pharynx is pink and moist.

LUNGS:  Clear.

HEART:  Normal S1, S2.

ABDOMEN:  Obese, bowel sounds are present, soft, nontender.

EXTREMITIES:  No gross edema or cyanosis.

SKIN:  Without rash.  Right groin/medial thigh area well indurated and tender

with a necrotic appearing center with a strong malodor and sanguineous drainage.

NEUROLOGIC:  Alert and oriented x 3.



LABORATORY DATA:  Today's WBC 12.5 and 14.7 on admission, hemoglobin 9.5,

platelet count 395,000.  Sed rate 70, electrolytes are unremarkable.  Creatinine

0.8, BUN 9, glucose 361.  CRP 84.5.



IMPRESSION:

1.  Worsening abscess of right groin/thigh area with history of group B strep

and anaerobic cultures pending from 07/25/2017.

2.  Leukocytosis.

3.  Diabetes.

4.  Super morbid obesity with BMI 62.



PLAN:  We will broaden antibiotics to cover for anaerobes as well.  I and D

scheduled for later today.  Intraoperative cultures would be appreciated. 

Monitor laboratory values.  Supportive care.



Thank you, Dr. Coffey for asking us to participate in this patient's care,

should you have further questions or concerns, please call.



 



______________________________

JEREMIAH CISSE MD



DR:  NANCY/poli  JOB#:  [id number] / [id number]

DD:  07/29/2017 11:57  DT:  07/29/2017 20:33

ADE

## 2017-07-29 NOTE — PDOC2
JOSE GUADALUPE BOOKER APRN 7/29/17 0820:


CONSULT


Date of Consult


Date of Consult


DATE: 7/29/17 


TIME: 08:11





Reason for Consult


Reason for Consult:


abscess





Referring Physician


Referring Physician:


ER





Identification/Chief Complaint


Chief Complaint


groin abscess


Problems:  





Source


Source:  Chart review, Patient





History of Present Illness


Reason for Visit:


Underwent I&D of right groin abscess 7/25 with Dr Singh, she continued to have 

worsening swelling and foul drainage from wound.  Came to ER for evaluation.





Past Medical History


Cardiovascular:  HTN


Endocrine:  Diabetes





Past Surgical History


Past Surgical History:  Other (I&D, female surgery)





Family History


Family History:  Diabetes





Social History


No


ALCOHOL:  rare


Drugs:  None


Lives:  Alone





Current Medications


Current Medications





Current Medications


Sodium Chloride 500 ml @  500 mls/hr 1X  ONCE IV  Last administered on 7/28/ 17at 19:57;  Start 7/28/17 at 20:15;  Stop 7/28/17 at 21:14;  Status DC


Vancomycin HCl (Vanco Per Pharmacy) 1 each PRN DAILY  PRN MC SEE COMMENTS Last 

administered on 7/28/17at 21:37;  Start 7/28/17 at 20:15


Ondansetron HCl (Zofran) 4 mg PRN Q8HRS  PRN IV NAUSEA/VOMITING;  Start 7/28/17 

at 20:15;  Stop 7/29/17 at 20:14


Morphine Sulfate 2 mg PRN Q2HR  PRN IV PAIN;  Start 7/28/17 at 20:15;  Stop 7/29 /17 at 20:14


Sodium Chloride 1,000 ml @  100 mls/hr Q10H IV  Last administered on 7/29/17at 

05:37;  Start 7/28/17 at 20:13;  Stop 7/29/17 at 20:12


Vancomycin HCl 2 gm/Sodium Chloride 500 ml @  250 mls/hr 1X  ONCE IV  Last 

administered on 7/28/17at 20:33;  Start 7/28/17 at 21:00;  Stop 7/28/17 at 22:59

;  Status DC


Insulin Human Regular (NovoLIN R VIAL) 10 unit 1X  ONCE IV ;  Start 7/28/17 at 

21:15;  Stop 7/28/17 at 22:58;  Status DC


Albuterol/ Ipratropium (Duoneb) 3 ml 1X  ONCE NEB  Last administered on 7/28/

17at 21:57;  Start 7/28/17 at 21:30;  Stop 7/28/17 at 21:31;  Status DC


Vancomycin HCl 1 each 1X  ONCE MC ;  Start 7/29/17 at 20:30;  Stop 7/29/17 at 20

:31


Vancomycin HCl 1.75 gm/Sodium Chloride 500 ml @  250 mls/hr Q8H IV  Last 

administered on 7/29/17at 05:36;  Start 7/29/17 at 05:00


Insulin Aspart (NovoLOG) 0-9 UNITS TIDWMEALS SQ ;  Start 7/29/17 at 08:00


Dextrose (Dextrose 50%-Water Syringe) 12.5 gm PRN Q15MIN  PRN IV SEE COMMENTS;  

Start 7/28/17 at 23:00


Insulin Aspart (NovoLOG) 10 units 1X  ONCE SQ  Last administered on 7/28/17at 23

:34;  Start 7/28/17 at 23:15;  Stop 7/28/17 at 23:16;  Status DC





Active Scripts


Active


Reported


Norco 5-325 Tablet (Acetaminophen/Hydrocodone Bitart) 1 Each Tablet 1-2 Tab PO 

PRN Q4-6HRS PRN


     LAST DOSE GIVEN:


     DATE:


     TIME:


Bactrim Ds Tablet (Sulfamethoxazole/Trimethoprim) 1 Each Tablet 1 Tab PO BID


Aleve (Naproxen Sodium) 220 Mg Capsule 440 Mg PO BID





Allergies


Allergies:  


Coded Allergies:  


     No Known Drug Allergies (Unverified , 7/25/17)





ROS


General:  No: Chills, Other (fevers)


PSYCHOLOGICAL ROS:  No: Anxiety, Depression


Eyes:  No Blurry vision, No Double vision


HEENT:  No: Heacaches, Sore Throat


Hematological and Lymphatic:  No: Bleeding Problems, Blood Clots


Respiratory:  No: Cough, Shortness of breath


Cardiovascular:  No Chest Pain, No Palpitations


Gastrointestinal:  No Nausea, No Vomiting


Genitourinary:  No Dysuria, No Hematuria


Musculoskeletal:  No Joint Pain, No Joint Swelling


Neurological:  No Impaired Coord/balance, No Numbness/Tingling


Skin:  Yes Other (see hpi)





Physical Exam


General:  Alert, Oriented X3, Cooperative, No acute distress


HEENT:  PERRLA, Mucous membr. moist/pink


Lungs:  Clear to auscultation, Normal air movement


Heart:  Normal S1, Normal S2, No murmurs, Other (tachy)


Abdomen:  Soft, No tenderness


Extremities:  No clubbing, No cyanosis


Skin:  Other (Right thigh with induration, odor, large central bulla with 

fluctaunce, , some purulent drainage, early skin changes to central area for 

necrosis )


Neuro:  Normal speech, Sensation intact


Psych/Mental Status:  Mental status NL, Mood NL


MUSCULOSKELETAL:  No deformity, No swelling





Vitals


VITALS





Vital Signs








  Date Time  Temp Pulse Resp B/P (MAP) Pulse Ox O2 Delivery O2 Flow Rate FiO2


 


7/29/17 07:27 98.5 92 20 156/99 (118) 97 Room Air  





 98.5       











Labs


Labs





Laboratory Tests








Test


  7/28/17


19:15 7/29/17


03:05 7/29/17


07:10


 


White Blood Count


  14.7 x10^3/uL


(4.0-11.0) 12.5 x10^3/uL


(4.0-11.0) 


 


 


Red Blood Count


  4.04 x10^6/uL


(3.50-5.40) 3.73 x10^6/uL


(3.50-5.40) 


 


 


Hemoglobin


  10.3 g/dL


(12.0-15.5) 9.5 g/dL


(12.0-15.5) 


 


 


Hematocrit


  32.3 %


(36.0-47.0) 30.1 %


(36.0-47.0) 


 


 


Mean Corpuscular Volume 80 fL ()  81 fL ()  


 


Mean Corpuscular Hemoglobin 25 pg (25-35)  25 pg (25-35)  


 


Mean Corpuscular Hemoglobin


Concent 32 g/dL


(31-37) 32 g/dL


(31-37) 


 


 


Red Cell Distribution Width


  15.3 %


(11.5-14.5) 15.4 %


(11.5-14.5) 


 


 


Platelet Count


  418 x10^3/uL


(140-400) 395 x10^3/uL


(140-400) 


 


 


Neutrophils (%) (Auto) 64 % (31-73)  66 % (31-73)  


 


Lymphocytes (%) (Auto) 27 % (24-48)  24 % (24-48)  


 


Monocytes (%) (Auto) 6 % (0-9)  7 % (0-9)  


 


Eosinophils (%) (Auto) 2 % (0-3)  2 % (0-3)  


 


Basophils (%) (Auto) 1 % (0-3)  0 % (0-3)  


 


Neutrophils # (Auto)


  9.4 x10^3uL


(1.8-7.7) 8.3 x10^3uL


(1.8-7.7) 


 


 


Lymphocytes # (Auto)


  4.0 x10^3/uL


(1.0-4.8) 3.1 x10^3/uL


(1.0-4.8) 


 


 


Monocytes # (Auto)


  0.8 x10^3/uL


(0.0-1.1) 0.9 x10^3/uL


(0.0-1.1) 


 


 


Eosinophils # (Auto)


  0.3 x10^3/uL


(0.0-0.7) 0.2 x10^3/uL


(0.0-0.7) 


 


 


Basophils # (Auto)


  0.2 x10^3/uL


(0.0-0.2) 0.0 x10^3/uL


(0.0-0.2) 


 


 


Erythrocyte Sedimentation Rate 70 (0-25)   


 


Sodium Level


  134 mmol/L


(136-145) 137 mmol/L


(136-145) 


 


 


Potassium Level


  3.9 mmol/L


(3.5-5.1) 3.9 mmol/L


(3.5-5.1) 


 


 


Chloride Level


  100 mmol/L


() 101 mmol/L


() 


 


 


Carbon Dioxide Level


  26 mmol/L


(21-32) 26 mmol/L


(21-32) 


 


 


Anion Gap 8 (6-14)  10 (6-14)  


 


Blood Urea Nitrogen


  11 mg/dL


(7-20) 9 mg/dL (7-20) 


  


 


 


Creatinine


  0.9 mg/dL


(0.6-1.0) 0.8 mg/dL


(0.6-1.0) 


 


 


Estimated GFR


(Cockcroft-Gault) 70.5 


  80.7 


  


 


 


Glucose Level


  369 mg/dL


(70-99) 361 mg/dL


(70-99) 


 


 


Calcium Level


  8.6 mg/dL


(8.5-10.1) 8.6 mg/dL


(8.5-10.1) 


 


 


C-Reactive Protein,


Quantitative 84.5 mg/L


(0-3.3) 


  


 


 


Glucose (Fingerstick)


  


  


  328 mg/dL


(70-99)








Laboratory Tests








Test


  7/28/17


19:15 7/29/17


03:05 7/29/17


07:10


 


White Blood Count


  14.7 x10^3/uL


(4.0-11.0) 12.5 x10^3/uL


(4.0-11.0) 


 


 


Red Blood Count


  4.04 x10^6/uL


(3.50-5.40) 3.73 x10^6/uL


(3.50-5.40) 


 


 


Hemoglobin


  10.3 g/dL


(12.0-15.5) 9.5 g/dL


(12.0-15.5) 


 


 


Hematocrit


  32.3 %


(36.0-47.0) 30.1 %


(36.0-47.0) 


 


 


Mean Corpuscular Volume 80 fL ()  81 fL ()  


 


Mean Corpuscular Hemoglobin 25 pg (25-35)  25 pg (25-35)  


 


Mean Corpuscular Hemoglobin


Concent 32 g/dL


(31-37) 32 g/dL


(31-37) 


 


 


Red Cell Distribution Width


  15.3 %


(11.5-14.5) 15.4 %


(11.5-14.5) 


 


 


Platelet Count


  418 x10^3/uL


(140-400) 395 x10^3/uL


(140-400) 


 


 


Neutrophils (%) (Auto) 64 % (31-73)  66 % (31-73)  


 


Lymphocytes (%) (Auto) 27 % (24-48)  24 % (24-48)  


 


Monocytes (%) (Auto) 6 % (0-9)  7 % (0-9)  


 


Eosinophils (%) (Auto) 2 % (0-3)  2 % (0-3)  


 


Basophils (%) (Auto) 1 % (0-3)  0 % (0-3)  


 


Neutrophils # (Auto)


  9.4 x10^3uL


(1.8-7.7) 8.3 x10^3uL


(1.8-7.7) 


 


 


Lymphocytes # (Auto)


  4.0 x10^3/uL


(1.0-4.8) 3.1 x10^3/uL


(1.0-4.8) 


 


 


Monocytes # (Auto)


  0.8 x10^3/uL


(0.0-1.1) 0.9 x10^3/uL


(0.0-1.1) 


 


 


Eosinophils # (Auto)


  0.3 x10^3/uL


(0.0-0.7) 0.2 x10^3/uL


(0.0-0.7) 


 


 


Basophils # (Auto)


  0.2 x10^3/uL


(0.0-0.2) 0.0 x10^3/uL


(0.0-0.2) 


 


 


Erythrocyte Sedimentation Rate 70 (0-25)   


 


Sodium Level


  134 mmol/L


(136-145) 137 mmol/L


(136-145) 


 


 


Potassium Level


  3.9 mmol/L


(3.5-5.1) 3.9 mmol/L


(3.5-5.1) 


 


 


Chloride Level


  100 mmol/L


() 101 mmol/L


() 


 


 


Carbon Dioxide Level


  26 mmol/L


(21-32) 26 mmol/L


(21-32) 


 


 


Anion Gap 8 (6-14)  10 (6-14)  


 


Blood Urea Nitrogen


  11 mg/dL


(7-20) 9 mg/dL (7-20) 


  


 


 


Creatinine


  0.9 mg/dL


(0.6-1.0) 0.8 mg/dL


(0.6-1.0) 


 


 


Estimated GFR


(Cockcroft-Gault) 70.5 


  80.7 


  


 


 


Glucose Level


  369 mg/dL


(70-99) 361 mg/dL


(70-99) 


 


 


Calcium Level


  8.6 mg/dL


(8.5-10.1) 8.6 mg/dL


(8.5-10.1) 


 


 


C-Reactive Protein,


Quantitative 84.5 mg/L


(0-3.3) 


  


 


 


Glucose (Fingerstick)


  


  


  328 mg/dL


(70-99)











Assessment/Plan


Assessment/Plan


large abscess to right groin, s/p I&D 4 days


DM, HTN


morbid obesity with BMI 61.8





continue IV abx


NPO, plan I&D today with NISHA Cronin MD 7/29/17 8938:


CONSULT


Allergies


Allergies:  


Coded Allergies:  


     No Known Drug Allergies (Unverified , 7/25/17)





Assessment/Plan


Assessment/Plan


Pt seen and examined.


Agree with Ms. Booker's note


Pt with recurrent large necrotic abscess


TO OR for I and D


R/B/A d/w pt


complicated by severe obesity


Thanks for consult!











JOSE GUADALUPE BOOKER Jul 29, 2017 08:20


NISHA RAMOS MD Jul 29, 2017 18:55

## 2017-07-30 VITALS — SYSTOLIC BLOOD PRESSURE: 138 MMHG | DIASTOLIC BLOOD PRESSURE: 85 MMHG

## 2017-07-30 VITALS — DIASTOLIC BLOOD PRESSURE: 75 MMHG | SYSTOLIC BLOOD PRESSURE: 126 MMHG

## 2017-07-30 VITALS — DIASTOLIC BLOOD PRESSURE: 80 MMHG | SYSTOLIC BLOOD PRESSURE: 141 MMHG

## 2017-07-30 VITALS — DIASTOLIC BLOOD PRESSURE: 89 MMHG | SYSTOLIC BLOOD PRESSURE: 143 MMHG

## 2017-07-30 VITALS — SYSTOLIC BLOOD PRESSURE: 142 MMHG | DIASTOLIC BLOOD PRESSURE: 91 MMHG

## 2017-07-30 VITALS — SYSTOLIC BLOOD PRESSURE: 156 MMHG | DIASTOLIC BLOOD PRESSURE: 85 MMHG

## 2017-07-30 VITALS — SYSTOLIC BLOOD PRESSURE: 134 MMHG | DIASTOLIC BLOOD PRESSURE: 86 MMHG

## 2017-07-30 RX ADMIN — PIPERACILLIN SODIUM AND TAZOBACTAM SODIUM SCH MLS/HR: 3; .375 INJECTION, POWDER, LYOPHILIZED, FOR SOLUTION INTRAVENOUS at 17:50

## 2017-07-30 RX ADMIN — SENNOSIDES AND DOCUSATE SODIUM SCH TAB: 8.6; 5 TABLET ORAL at 20:34

## 2017-07-30 RX ADMIN — PIPERACILLIN SODIUM AND TAZOBACTAM SODIUM SCH MLS/HR: 3; .375 INJECTION, POWDER, LYOPHILIZED, FOR SOLUTION INTRAVENOUS at 12:30

## 2017-07-30 RX ADMIN — PIPERACILLIN SODIUM AND TAZOBACTAM SODIUM SCH MLS/HR: 3; .375 INJECTION, POWDER, LYOPHILIZED, FOR SOLUTION INTRAVENOUS at 23:17

## 2017-07-30 RX ADMIN — HYDROCODONE BITARTRATE AND ACETAMINOPHEN PRN TAB: 5; 325 TABLET ORAL at 08:16

## 2017-07-30 RX ADMIN — PIPERACILLIN SODIUM AND TAZOBACTAM SODIUM SCH MLS/HR: 3; .375 INJECTION, POWDER, LYOPHILIZED, FOR SOLUTION INTRAVENOUS at 06:28

## 2017-07-30 RX ADMIN — INSULIN ASPART SCH UNITS: 100 INJECTION, SOLUTION INTRAVENOUS; SUBCUTANEOUS at 17:10

## 2017-07-30 RX ADMIN — HYDROCODONE BITARTRATE AND ACETAMINOPHEN PRN TAB: 5; 325 TABLET ORAL at 03:58

## 2017-07-30 RX ADMIN — INSULIN ASPART SCH UNITS: 100 INJECTION, SOLUTION INTRAVENOUS; SUBCUTANEOUS at 08:20

## 2017-07-30 RX ADMIN — INSULIN ASPART SCH UNITS: 100 INJECTION, SOLUTION INTRAVENOUS; SUBCUTANEOUS at 12:33

## 2017-07-30 RX ADMIN — INSULIN ASPART SCH UNITS: 100 INJECTION, SOLUTION INTRAVENOUS; SUBCUTANEOUS at 22:17

## 2017-07-30 RX ADMIN — HYDROCODONE BITARTRATE AND ACETAMINOPHEN PRN TAB: 5; 325 TABLET ORAL at 23:23

## 2017-07-30 RX ADMIN — SENNOSIDES AND DOCUSATE SODIUM SCH TAB: 8.6; 5 TABLET ORAL at 08:17

## 2017-07-30 NOTE — PDOC
Infectious Disease Note


Subjective


Subjective


s/p I and D


Comfortable at the moment





ROS


ROS


GEN: Denies fevers, chills, sweats


V: Denies chest pain


RESP: Denies shortness of air, cough


GI: Denies n/v/d





Vital Sign


Vital Signs





Vital Signs








  Date Time  Temp Pulse Resp B/P (MAP) Pulse Ox O2 Delivery O2 Flow Rate FiO2


 


7/30/17 09:16   18  97 Room Air  


 


7/30/17 08:16       2.0 


 


7/30/17 07:00 98.0 92  142/91 (108)    





 98.0       











Physical Exam


PHYSICAL EXAM


GENERAL:  Propped up in bed, relaxed appearance 


LUNGS:  Clear.


HEART:  Normal S1, S2.


ABDOMEN:  Obese, bowel sounds are present, soft, nontender.


EXTREMITIES:  No gross edema or cyanosis.


SKIN:  Without rash.  Right groin/medial thigh area with less induration and 

odor, dressing/packing intact.  


NEUROLOGIC:  Alert and oriented x 3.


Peripheral IV: ok





Labs


Lab





Laboratory Tests








Test


  7/29/17


16:59 7/29/17


19:32 7/29/17


21:37


 


Glucose (Fingerstick)


  261 mg/dL


(70-99) 231 mg/dL


(70-99) 240 mg/dL


(70-99)











Objective


Assessment


Worsening abscess right groin/thigh area. s/p excisional debridement of 

necrotic tissue down to adipose tissue of right groin, and I and D, 7/29. Intra-

op cultures pending 


 -s/p previous I and D on 7/25. group B Strep, anaerobic cx still pending.


Leukocytosis 


 -h/o pre-op steroids on 7/25 


Diabetes


Super morbid obesity, BMI 62





Plan


Plan of Care


vanc and Zosyn.


Intra-op cultures pending 


Supportive care


Attending Co-Sign


The patient was seen and interviewed as well as examined at the bedside. The 

chart was reviewed. The case was discussed. Agree with the plan of care.











SHU KAYE Jul 30, 2017 10:32


JEREMIAH CISSE MD Jul 30, 2017 11:41

## 2017-07-30 NOTE — CONS
DATE OF CONSULTATION:  



ADDENDUM



HISTORY OF PRESENT ILLNESS:  The patient is scheduled to return to the OR later

today for additional debridement.



PAST MEDICAL HISTORY:  Diabetes mellitus, morbid obesity, asthma, arthritis and

hypertension.



PAST SURGICAL HISTORY:  Recent I and D of right groin abscess, 07/25/2017.  GYN

surgery.



FAMILY HISTORY:  Positive for diabetes.



SOCIAL HISTORY:  The patient is employed.  She is single.  Nonsmoker.



ALLERGIES:  No known drug allergies.



MEDICATIONS:  Vancomycin.  Other medications are available and have been

reviewed on the MAR.  Also of note, she did receive a dose of dexamethasone

preop on 07/25/2017 and was discharged home on Bactrim.



REVIEW OF SYSTEMS:  The patient denies fevers, chills or sweats.  Denies

headache, nasal/sinus congestion or sore throat.  Denies cough, shortness of air

or wheezing.  Denies chest pain, palpitations or swelling.  Denies nausea or

vomiting.  She had loose stool earlier today.  Denies cramping or bloating. 

Denies a rash.



PHYSICAL EXAMINATION:

GENERAL:  An overweight -American female lying in bed in no apparent

distress.

VITAL SIGNS:  Afebrile.  Stable.

HEENT:  Oral cavity, pharynx is pink and moist.

LUNGS:  Clear.

HEART:  Normal S1, S2.

ABDOMEN:  Obese, bowel sounds are present, soft, nontender.

EXTREMITIES:  No gross edema or cyanosis.

SKIN:  Without rash.  Right groin/medial thigh area well inundated and tender

with a necrotic appearing center.  A strong malodor and sanguineous drainage.

NEUROLOGIC:  Alert and oriented x 3.



LABORATORY DATA:  Today's WBC 12.5 and 14.7 on admission, hemoglobin 9.5,

platelet count 395,000.  Sed rate 70, electrolytes are unremarkable.  Creatinine

0.8, BUN 9, glucose 361.  CRP 84.5.



IMPRESSION:

1.  Worsening abscess of right groin/thigh area with history of group B strep

and anaerobic cultures pending from 07/25/2017.

2.  Leukocytosis.

3.  Diabetes.

4.  Super morbid obesity with BMI 62.



PLAN:  We will broaden antibiotics to cover for anaerobes as well.  I and D

scheduled for later today.  Intraoperative cultures would be appreciated. 

Monitor laboratory values.  Supportive care.



Thank you, Dr. Coffey for asking us to participate in this patient's care,

should you have further questions or concerns, please call.

 



______________________________

JEREMIAH CISSE MD



DR:  HONEY/poli  JOB#:  [id number] / [id number]

DD:  07/29/2017 12:04  DT:  07/29/2017 21:38

## 2017-07-30 NOTE — PDOC
SURGICAL PROGRESS NOTE


Subjective


 right groin, drainage


Vital Signs





Vital Signs








  Date Time  Temp Pulse Resp B/P (MAP) Pulse Ox O2 Delivery O2 Flow Rate FiO2


 


7/30/17 08:16   18  97 Nasal Cannula 2.0 


 


7/30/17 07:00 98.0 92  142/91 (108)    





 98.0       








I&O











Intake and Output 


 


 7/30/17





 07:00


 


Intake Total 1400 ml


 


Balance 1400 ml


 


 


 


Intake Oral 400 ml


 


IV Total 1000 ml


 


# Voids 5








General:  Alert, Oriented X3, Cooperative, No acute distress


Skin:  Other (right groin dressing in place, drainage, less induration )


Labs





Laboratory Tests








Test


  7/28/17


19:15 7/29/17


03:05 7/29/17


07:10 7/29/17


10:19


 


White Blood Count


  14.7 x10^3/uL


(4.0-11.0) 12.5 x10^3/uL


(4.0-11.0) 


  


 


 


Red Blood Count


  4.04 x10^6/uL


(3.50-5.40) 3.73 x10^6/uL


(3.50-5.40) 


  


 


 


Hemoglobin


  10.3 g/dL


(12.0-15.5) 9.5 g/dL


(12.0-15.5) 


  


 


 


Hematocrit


  32.3 %


(36.0-47.0) 30.1 %


(36.0-47.0) 


  


 


 


Mean Corpuscular Volume 80 fL ()  81 fL ()   


 


Mean Corpuscular Hemoglobin 25 pg (25-35)  25 pg (25-35)   


 


Mean Corpuscular Hemoglobin


Concent 32 g/dL


(31-37) 32 g/dL


(31-37) 


  


 


 


Red Cell Distribution Width


  15.3 %


(11.5-14.5) 15.4 %


(11.5-14.5) 


  


 


 


Platelet Count


  418 x10^3/uL


(140-400) 395 x10^3/uL


(140-400) 


  


 


 


Neutrophils (%) (Auto) 64 % (31-73)  66 % (31-73)   


 


Lymphocytes (%) (Auto) 27 % (24-48)  24 % (24-48)   


 


Monocytes (%) (Auto) 6 % (0-9)  7 % (0-9)   


 


Eosinophils (%) (Auto) 2 % (0-3)  2 % (0-3)   


 


Basophils (%) (Auto) 1 % (0-3)  0 % (0-3)   


 


Neutrophils # (Auto)


  9.4 x10^3uL


(1.8-7.7) 8.3 x10^3uL


(1.8-7.7) 


  


 


 


Lymphocytes # (Auto)


  4.0 x10^3/uL


(1.0-4.8) 3.1 x10^3/uL


(1.0-4.8) 


  


 


 


Monocytes # (Auto)


  0.8 x10^3/uL


(0.0-1.1) 0.9 x10^3/uL


(0.0-1.1) 


  


 


 


Eosinophils # (Auto)


  0.3 x10^3/uL


(0.0-0.7) 0.2 x10^3/uL


(0.0-0.7) 


  


 


 


Basophils # (Auto)


  0.2 x10^3/uL


(0.0-0.2) 0.0 x10^3/uL


(0.0-0.2) 


  


 


 


Erythrocyte Sedimentation Rate 70 (0-25)    


 


Sodium Level


  134 mmol/L


(136-145) 137 mmol/L


(136-145) 


  


 


 


Potassium Level


  3.9 mmol/L


(3.5-5.1) 3.9 mmol/L


(3.5-5.1) 


  


 


 


Chloride Level


  100 mmol/L


() 101 mmol/L


() 


  


 


 


Carbon Dioxide Level


  26 mmol/L


(21-32) 26 mmol/L


(21-32) 


  


 


 


Anion Gap 8 (6-14)  10 (6-14)   


 


Blood Urea Nitrogen


  11 mg/dL


(7-20) 9 mg/dL (7-20) 


  


  


 


 


Creatinine


  0.9 mg/dL


(0.6-1.0) 0.8 mg/dL


(0.6-1.0) 


  


 


 


Estimated GFR


(Cockcroft-Gault) 70.5 


  80.7 


  


  


 


 


Glucose Level


  369 mg/dL


(70-99) 361 mg/dL


(70-99) 


  


 


 


Calcium Level


  8.6 mg/dL


(8.5-10.1) 8.6 mg/dL


(8.5-10.1) 


  


 


 


C-Reactive Protein,


Quantitative 84.5 mg/L


(0-3.3) 


  


  


 


 


Glucose (Fingerstick)


  


  


  328 mg/dL


(70-99) 298 mg/dL


(70-99)


 


Test


  7/29/17


16:59 7/29/17


19:32 7/29/17


21:37 


 


 


Glucose (Fingerstick)


  261 mg/dL


(70-99) 231 mg/dL


(70-99) 240 mg/dL


(70-99) 


 








Laboratory Tests








Test


  7/29/17


10:19 7/29/17


16:59 7/29/17


19:32 7/29/17


21:37


 


Glucose (Fingerstick)


  298 mg/dL


(70-99) 261 mg/dL


(70-99) 231 mg/dL


(70-99) 240 mg/dL


(70-99)








Assessment/Plan


s/p I&D, debridement


drain in place, wound care, abx


Problems:  











JOSE GUADALUPE GRANADO APRN Jul 30, 2017 10:00

## 2017-07-30 NOTE — PDOC
PROGRESS NOTES


Chief Complaint


Chief Complaint


Abscess of right thigh





History of Present Illness


History of Present Illness


Pt resting in bed NAD


Family at bedside





Vitals


Vitals





Vital Signs








  Date Time  Temp Pulse Resp B/P (MAP) Pulse Ox O2 Delivery O2 Flow Rate FiO2


 


7/30/17 10:45 97.9 96 20 134/86 (102) 97 Room Air  





 97.9       


 


7/30/17 08:16       2.0 











Physical Exam


General:  Alert, Oriented X3, Cooperative, No acute distress


Heart:  Regular rate, Normal S1, Normal S2, No murmurs


Lungs:  Clear, Other (No RRW)


Abdomen:  Normal bowel sounds, Soft, No tenderness


Extremities:  No clubbing, No cyanosis


Skin:  No rashes, Other (right groin dressing in place, drainage, less 

induration )





Labs


LABS





Laboratory Tests








Test


  7/29/17


16:59 7/29/17


19:32 7/29/17


21:37 7/30/17


07:06


 


Glucose (Fingerstick)


  261 mg/dL


(70-99) 231 mg/dL


(70-99) 240 mg/dL


(70-99) 319 mg/dL


(70-99)


 


Test


  7/30/17


10:22 


  


  


 


 


Glucose (Fingerstick)


  331 mg/dL


(70-99) 


  


  


 











Review of Systems


Review of Systems


GEN: Denies fevers, chills, sweats


CV: Denies chest pain


RESP: Denies shortness of breath, cough


GI: Denies n/v


MSK: Complains of weakness





Assessment and Plan


Assessmemt and Plan


Abscess of right thigh





Plan:


-Monitor excision and debridement site


-Continue wound care


-DC lovenox due to heavy vaginal bleeding


-Discussed DVT prophylaxis with patient and family member


-Start SCD


-Appreciate subspecialist input OB/GYN


-Continue abx


Problems:  





Comment


Review of Relevant


I have reviewed the following items lana (where applicable) has been applied.


Labs





Laboratory Tests








Test


  7/28/17


19:15 7/29/17


03:05 7/29/17


07:10 7/29/17


10:19


 


White Blood Count


  14.7 x10^3/uL


(4.0-11.0) 12.5 x10^3/uL


(4.0-11.0) 


  


 


 


Red Blood Count


  4.04 x10^6/uL


(3.50-5.40) 3.73 x10^6/uL


(3.50-5.40) 


  


 


 


Hemoglobin


  10.3 g/dL


(12.0-15.5) 9.5 g/dL


(12.0-15.5) 


  


 


 


Hematocrit


  32.3 %


(36.0-47.0) 30.1 %


(36.0-47.0) 


  


 


 


Mean Corpuscular Volume 80 fL ()  81 fL ()   


 


Mean Corpuscular Hemoglobin 25 pg (25-35)  25 pg (25-35)   


 


Mean Corpuscular Hemoglobin


Concent 32 g/dL


(31-37) 32 g/dL


(31-37) 


  


 


 


Red Cell Distribution Width


  15.3 %


(11.5-14.5) 15.4 %


(11.5-14.5) 


  


 


 


Platelet Count


  418 x10^3/uL


(140-400) 395 x10^3/uL


(140-400) 


  


 


 


Neutrophils (%) (Auto) 64 % (31-73)  66 % (31-73)   


 


Lymphocytes (%) (Auto) 27 % (24-48)  24 % (24-48)   


 


Monocytes (%) (Auto) 6 % (0-9)  7 % (0-9)   


 


Eosinophils (%) (Auto) 2 % (0-3)  2 % (0-3)   


 


Basophils (%) (Auto) 1 % (0-3)  0 % (0-3)   


 


Neutrophils # (Auto)


  9.4 x10^3uL


(1.8-7.7) 8.3 x10^3uL


(1.8-7.7) 


  


 


 


Lymphocytes # (Auto)


  4.0 x10^3/uL


(1.0-4.8) 3.1 x10^3/uL


(1.0-4.8) 


  


 


 


Monocytes # (Auto)


  0.8 x10^3/uL


(0.0-1.1) 0.9 x10^3/uL


(0.0-1.1) 


  


 


 


Eosinophils # (Auto)


  0.3 x10^3/uL


(0.0-0.7) 0.2 x10^3/uL


(0.0-0.7) 


  


 


 


Basophils # (Auto)


  0.2 x10^3/uL


(0.0-0.2) 0.0 x10^3/uL


(0.0-0.2) 


  


 


 


Erythrocyte Sedimentation Rate 70 (0-25)    


 


Sodium Level


  134 mmol/L


(136-145) 137 mmol/L


(136-145) 


  


 


 


Potassium Level


  3.9 mmol/L


(3.5-5.1) 3.9 mmol/L


(3.5-5.1) 


  


 


 


Chloride Level


  100 mmol/L


() 101 mmol/L


() 


  


 


 


Carbon Dioxide Level


  26 mmol/L


(21-32) 26 mmol/L


(21-32) 


  


 


 


Anion Gap 8 (6-14)  10 (6-14)   


 


Blood Urea Nitrogen


  11 mg/dL


(7-20) 9 mg/dL (7-20) 


  


  


 


 


Creatinine


  0.9 mg/dL


(0.6-1.0) 0.8 mg/dL


(0.6-1.0) 


  


 


 


Estimated GFR


(Cockcroft-Gault) 70.5 


  80.7 


  


  


 


 


Glucose Level


  369 mg/dL


(70-99) 361 mg/dL


(70-99) 


  


 


 


Calcium Level


  8.6 mg/dL


(8.5-10.1) 8.6 mg/dL


(8.5-10.1) 


  


 


 


C-Reactive Protein,


Quantitative 84.5 mg/L


(0-3.3) 


  


  


 


 


Glucose (Fingerstick)


  


  


  328 mg/dL


(70-99) 298 mg/dL


(70-99)


 


Test


  7/29/17


16:59 7/29/17


19:32 7/29/17


21:37 7/30/17


07:06


 


Glucose (Fingerstick)


  261 mg/dL


(70-99) 231 mg/dL


(70-99) 240 mg/dL


(70-99) 319 mg/dL


(70-99)


 


Test


  7/30/17


10:22 


  


  


 


 


Glucose (Fingerstick)


  331 mg/dL


(70-99) 


  


  


 








Laboratory Tests








Test


  7/29/17


16:59 7/29/17


19:32 7/29/17


21:37 7/30/17


07:06


 


Glucose (Fingerstick)


  261 mg/dL


(70-99) 231 mg/dL


(70-99) 240 mg/dL


(70-99) 319 mg/dL


(70-99)


 


Test


  7/30/17


10:22 


  


  


 


 


Glucose (Fingerstick)


  331 mg/dL


(70-99) 


  


  


 








Microbiology


7/29/17 Gram Stain - Final, Complete


Medications





Current Medications


Sodium Chloride 500 ml @  500 mls/hr 1X  ONCE IV  Last administered on 7/28/ 17at 19:57;  Start 7/28/17 at 20:15;  Stop 7/28/17 at 21:14;  Status DC


Vancomycin HCl (Vanco Per Pharmacy) 1 each PRN DAILY  PRN MC SEE COMMENTS Last 

administered on 7/29/17at 08:57;  Start 7/28/17 at 20:15;  Stop 7/29/17 at 12:34

;  Status DC


Ondansetron HCl (Zofran) 4 mg PRN Q8HRS  PRN IV NAUSEA/VOMITING;  Start 7/28/17 

at 20:15;  Stop 7/29/17 at 19:42;  Status DC


Morphine Sulfate 2 mg PRN Q2HR  PRN IV PAIN;  Start 7/28/17 at 20:15;  Stop 7/29 /17 at 20:14;  Status DC


Sodium Chloride 1,000 ml @  100 mls/hr Q10H IV  Last administered on 7/29/17at 

12:07;  Start 7/28/17 at 20:13;  Stop 7/29/17 at 20:12;  Status DC


Vancomycin HCl 2 gm/Sodium Chloride 500 ml @  250 mls/hr 1X  ONCE IV  Last 

administered on 7/28/17at 20:33;  Start 7/28/17 at 21:00;  Stop 7/28/17 at 22:59

;  Status DC


Insulin Human Regular (NovoLIN R VIAL) 10 unit 1X  ONCE IV ;  Start 7/28/17 at 

21:15;  Stop 7/28/17 at 22:58;  Status DC


Albuterol/ Ipratropium (Duoneb) 3 ml 1X  ONCE NEB  Last administered on 7/28/ 17at 21:57;  Start 7/28/17 at 21:30;  Stop 7/28/17 at 21:31;  Status DC


Vancomycin HCl 1 each 1X  ONCE MC ;  Start 7/29/17 at 20:30;  Stop 7/29/17 at 20

:30;  Status DC


Vancomycin HCl 1.75 gm/Sodium Chloride 500 ml @  250 mls/hr Q8H IV  Last 

administered on 7/29/17at 05:36;  Start 7/29/17 at 05:00;  Stop 7/29/17 at 12:33

;  Status DC


Insulin Aspart (NovoLOG) 0-9 UNITS TIDWMEALS SQ  Last administered on 7/30/17at 

12:33;  Start 7/29/17 at 08:00


Dextrose (Dextrose 50%-Water Syringe) 12.5 gm PRN Q15MIN  PRN IV SEE COMMENTS;  

Start 7/28/17 at 23:00


Insulin Aspart (NovoLOG) 10 units 1X  ONCE SQ  Last administered on 7/28/17at 23

:34;  Start 7/28/17 at 23:15;  Stop 7/28/17 at 23:16;  Status DC


Piperacillin Sod/ Tazobactam Sod 3.375 gm/Sodium Chloride 50 ml @  100 mls/hr 

Q6HRS IV  Last administered on 7/30/17at 12:30;  Start 7/29/17 at 12:00


Fentanyl Citrate (Fentanyl 2ml Vial) 100 mcg STK-MED ONCE .ROUTE ;  Start 7/29/ 17 at 18:37;  Stop 7/29/17 at 18:38;  Status DC


Sevoflurane (Ultane) 15 ml STK-MED ONCE IH ;  Start 7/29/17 at 18:37;  Stop 7/29 /17 at 18:38;  Status DC


Propofol 20 ml @ As Directed STK-MED ONCE IV ;  Start 7/29/17 at 18:37;  Stop 7/ 29/17 at 18:38;  Status DC


Dexamethasone Sodium Phosphate (Decadron) 20 mg STK-MED ONCE .ROUTE ;  Start 7/ 29/17 at 18:37;  Stop 7/29/17 at 18:38;  Status DC


Ondansetron HCl (Zofran) 4 mg STK-MED ONCE .ROUTE ;  Start 7/29/17 at 18:37;  

Stop 7/29/17 at 18:38;  Status DC


Lidocaine HCl (Lidocaine Pf 2% Vial) 5 ml STK-MED ONCE .ROUTE ;  Start 7/29/17 

at 18:37;  Stop 7/29/17 at 18:38;  Status DC


Ondansetron HCl (Zofran) 4 mg PRN Q6HRS  PRN IV NAUSEA/VOMITING;  Start 7/29/17 

at 19:00;  Stop 7/29/17 at 23:59;  Status DC


Fentanyl Citrate (Fentanyl 2ml Vial) 25 mcg PRN Q5MIN  PRN IV MILD PAIN;  Start 

7/29/17 at 19:00;  Stop 7/29/17 at 23:59;  Status DC


Fentanyl Citrate (Fentanyl 2ml Vial) 50 mcg PRN Q5MIN  PRN IV MODERATE PAIN 

Last administered on 7/29/17at 20:04;  Start 7/29/17 at 19:00;  Stop 7/29/17 at 

23:59;  Status DC


Morphine Sulfate 1 mg PRN Q10MIN  PRN IV SEVERE PAIN Last administered on 7/29/ 17at 19:50;  Start 7/29/17 at 19:00;  Stop 7/29/17 at 23:59;  Status DC


Ringer's Solution 1,000 ml @  30 mls/hr Q24H IV  Last administered on 7/29/17at 

18:55;  Start 7/29/17 at 19:00;  Stop 7/29/17 at 23:59;  Status DC


Lidocaine HCl 2 ml PRN 1X  PRN ID PRIOR TO IV START;  Start 7/29/17 at 19:00;  

Stop 7/29/17 at 23:59;  Status DC


Hydromorphone HCl (Dilaudid) 0.5 mg PRN Q10MIN  PRN IV SEV PAIN, Second choice;

  Start 7/29/17 at 19:00;  Stop 7/29/17 at 23:59;  Status DC


Prochlorperazine Edisylate (Compazine) 5 mg PACU PRN  PRN IV NAUSEA, MRX1;  

Start 7/29/17 at 19:00;  Stop 7/29/17 at 23:59;  Status DC


Enoxaparin Sodium (Lovenox 60mg Syringe) 60 mg Q12HR SQ ;  Start 7/30/17 at 09:

00;  Stop 7/30/17 at 11:57;  Status DC


Sodium Chloride (Normal Saline Flush) 3 ml QSHIFT  PRN IV AFTER MEDS AND BLOOD 

DRAWS;  Start 7/29/17 at 19:30


Acetaminophen/ Hydrocodone Bitart (Lortab 5/325) 1 tab PRN Q4HRS  PRN PO MILD 

PAIN Last administered on 7/30/17at 08:16;  Start 7/29/17 at 19:30


Senna/Docusate Sodium (Senna Plus) 1 tab BID PO  Last administered on 7/30/17at 

08:17;  Start 7/29/17 at 21:00


Ondansetron HCl (Zofran) 4 mg PRN Q6HRS  PRN IV NAUESA, 1ST CHOICE;  Start 7/29/ 17 at 19:30


Insulin Aspart (NovoLOG VIAL) 4 unit 1X  ONCE SQ  Last administered on 7/29/ 17at 19:47;  Start 7/29/17 at 19:45;  Stop 7/29/17 at 19:46;  Status DC


Morphine Sulfate 2 mg STK-MED ONCE .ROUTE ;  Start 7/29/17 at 19:39;  Stop 7/29/ 17 at 19:40;  Status DC


Insulin Aspart (NovoLOG VIAL) 100 unit STK-MED ONCE SQ ;  Start 7/29/17 at 19:39

;  Stop 7/29/17 at 19:40;  Status DC





Active Scripts


Active


Reported


Norco 5-325 Tablet (Acetaminophen/Hydrocodone Bitart) 1 Each Tablet 1-2 Tab PO 

PRN Q4-6HRS PRN


     LAST DOSE GIVEN:


     DATE:


     TIME:


Bactrim Ds Tablet (Sulfamethoxazole/Trimethoprim) 1 Each Tablet 1 Tab PO BID


Aleve (Naproxen Sodium) 220 Mg Capsule 440 Mg PO BID


Vitals/I & O





Vital Sign - Last 24 Hours








 7/29/17 7/29/17 7/29/17 7/29/17





 15:01 19:25 19:25 19:40


 


Temp 98.0  97.2 





 98.0  97.2 


 


Pulse 91  101 


 


Resp 20  20 20


 


B/P (MAP) 154/86 (108)  149/83 


 


Pulse Ox 97  100 99


 


O2 Delivery Room Air  Simple Mask 


 


O2 Flow Rate  10 10 


 


    





    





 7/29/17 7/29/17 7/29/17 7/29/17





 19:40 19:50 19:50 19:55


 


Pulse 90   88


 


Resp 20 20 20 20


 


B/P (MAP) 171/119   171/117


 


Pulse Ox 100 99 99 98


 


O2 Delivery Simple Mask Simple Mask Simple Mask Nasal Cannula


 


O2 Flow Rate 10 10.0 10.0 2





 7/29/17 7/29/17 7/29/17 7/29/17





 20:04 20:10 20:20 20:20


 


Temp   98.0 





   98.0 


 


Pulse  86 88 


 


Resp 20 20 20 


 


B/P (MAP)  171/78 165/81 


 


Pulse Ox 99 98 98 


 


O2 Delivery Room Air Nasal Cannula Nasal Cannula Nasal Cannula


 


O2 Flow Rate 10.0 2 2 2


 


    





    





 7/29/17 7/29/17 7/29/17 7/29/17





 20:35 20:45 21:30 21:30


 


Temp  98.2  97.6





  98.2  97.6


 


Pulse  92  99


 


Resp  20 16 20


 


B/P (MAP)  141/81 (101)  157/93 (114)


 


Pulse Ox  99  99


 


O2 Delivery Nasal Cannula Nasal Cannula Nasal Cannula Nasal Cannula


 


O2 Flow Rate 2.0 2.0 2.0 2.0


 


    





    





 7/29/17 7/29/17 7/29/17 7/30/17





 22:00 22:30 23:30 00:30


 


Temp 97.8 97.8 98.3 98.0





 97.8 97.8 98.3 98.0


 


Pulse 96 94 98 97


 


Resp 20 20 18 18


 


B/P (MAP) 149/86 (107) 142/78 (99) 130/72 (91) 141/80 (100)


 


Pulse Ox 98 97 97 98


 


O2 Delivery Nasal Cannula Nasal Cannula Room Air Room Air


 


O2 Flow Rate 2.0 2.0  


 


    





    





 7/30/17 7/30/17 7/30/17 7/30/17





 03:59 07:00 07:40 08:16


 


Temp 97.8 98.0  





 97.8 98.0  


 


Pulse 96 92  


 


Resp 18 20  18


 


B/P (MAP) 156/85 (108) 142/91 (108)  


 


Pulse Ox 97 97  97


 


O2 Delivery Room Air Room Air Room Air Nasal Cannula


 


O2 Flow Rate    2.0


 


    





    





 7/30/17 7/30/17  





 09:16 10:45  


 


Temp  97.9  





  97.9  


 


Pulse  96  


 


Resp 18 20  


 


B/P (MAP)  134/86 (102)  


 


Pulse Ox 97 97  


 


O2 Delivery Room Air Room Air  














Intake and Output   


 


 7/29/17 7/29/17 7/30/17





 15:00 23:00 07:00


 


Intake Total  1000 ml 400 ml


 


Balance  1000 ml 400 ml

















BRITTANY YOO III DO Jul 30, 2017 13:58

## 2017-07-31 VITALS — SYSTOLIC BLOOD PRESSURE: 174 MMHG | DIASTOLIC BLOOD PRESSURE: 107 MMHG

## 2017-07-31 VITALS — SYSTOLIC BLOOD PRESSURE: 165 MMHG | DIASTOLIC BLOOD PRESSURE: 94 MMHG

## 2017-07-31 VITALS — DIASTOLIC BLOOD PRESSURE: 91 MMHG | SYSTOLIC BLOOD PRESSURE: 143 MMHG

## 2017-07-31 VITALS — DIASTOLIC BLOOD PRESSURE: 83 MMHG | SYSTOLIC BLOOD PRESSURE: 135 MMHG

## 2017-07-31 VITALS — SYSTOLIC BLOOD PRESSURE: 134 MMHG | DIASTOLIC BLOOD PRESSURE: 78 MMHG

## 2017-07-31 RX ADMIN — INSULIN ASPART SCH UNITS: 100 INJECTION, SOLUTION INTRAVENOUS; SUBCUTANEOUS at 12:23

## 2017-07-31 RX ADMIN — SENNOSIDES AND DOCUSATE SODIUM SCH TAB: 8.6; 5 TABLET ORAL at 20:28

## 2017-07-31 RX ADMIN — HYDROCODONE BITARTRATE AND ACETAMINOPHEN PRN TAB: 5; 325 TABLET ORAL at 17:22

## 2017-07-31 RX ADMIN — INSULIN ASPART SCH UNITS: 100 INJECTION, SOLUTION INTRAVENOUS; SUBCUTANEOUS at 08:27

## 2017-07-31 RX ADMIN — SENNOSIDES AND DOCUSATE SODIUM SCH TAB: 8.6; 5 TABLET ORAL at 08:23

## 2017-07-31 RX ADMIN — PIPERACILLIN SODIUM AND TAZOBACTAM SODIUM SCH MLS/HR: 3; .375 INJECTION, POWDER, LYOPHILIZED, FOR SOLUTION INTRAVENOUS at 17:22

## 2017-07-31 RX ADMIN — HYDROCODONE BITARTRATE AND ACETAMINOPHEN PRN TAB: 5; 325 TABLET ORAL at 05:35

## 2017-07-31 RX ADMIN — INSULIN ASPART SCH UNITS: 100 INJECTION, SOLUTION INTRAVENOUS; SUBCUTANEOUS at 20:36

## 2017-07-31 RX ADMIN — ENOXAPARIN SODIUM SCH MG: 100 INJECTION SUBCUTANEOUS at 20:30

## 2017-07-31 RX ADMIN — PIPERACILLIN SODIUM AND TAZOBACTAM SODIUM SCH MLS/HR: 3; .375 INJECTION, POWDER, LYOPHILIZED, FOR SOLUTION INTRAVENOUS at 12:13

## 2017-07-31 RX ADMIN — PIPERACILLIN SODIUM AND TAZOBACTAM SODIUM SCH MLS/HR: 3; .375 INJECTION, POWDER, LYOPHILIZED, FOR SOLUTION INTRAVENOUS at 05:31

## 2017-07-31 RX ADMIN — INSULIN ASPART SCH UNITS: 100 INJECTION, SOLUTION INTRAVENOUS; SUBCUTANEOUS at 17:33

## 2017-07-31 NOTE — PDOC
SURGICAL PROGRESS NOTE


Subjective


Pt with c/o fatigue, arnold pO


Vital Signs





Vital Signs








  Date Time  Temp Pulse Resp B/P (MAP) Pulse Ox O2 Delivery O2 Flow Rate FiO2


 


7/31/17 06:48 98.2 101  134/78 (96) 98 Room Air  





 98.2       


 


7/31/17 06:46   18     


 


7/30/17 08:16       2.0 








I&O











Intake and Output 


 


 7/31/17





 07:00


 


Intake Total 2840 ml


 


Balance 2840 ml


 


 


 


Intake Oral 2740 ml


 


IV Total 100 ml


 


# Voids 8








General:  Alert, Oriented X3, Cooperative, No acute distress


Skin:  Other (dressing intact)


Labs





Laboratory Tests








Test


  7/29/17


10:19 7/29/17


16:59 7/29/17


19:32 7/29/17


21:37


 


Glucose (Fingerstick)


  298 mg/dL


(70-99) 261 mg/dL


(70-99) 231 mg/dL


(70-99) 240 mg/dL


(70-99)


 


Test


  7/30/17


07:06 7/30/17


10:22 7/30/17


16:17 7/30/17


21:21


 


Glucose (Fingerstick)


  319 mg/dL


(70-99) 331 mg/dL


(70-99) 295 mg/dL


(70-99) 307 mg/dL


(70-99)


 


Test


  7/31/17


07:19 


  


  


 


 


Glucose (Fingerstick)


  297 mg/dL


(70-99) 


  


  


 








Laboratory Tests








Test


  7/30/17


10:22 7/30/17


16:17 7/30/17


21:21 7/31/17


07:19


 


Glucose (Fingerstick)


  331 mg/dL


(70-99) 295 mg/dL


(70-99) 307 mg/dL


(70-99) 297 mg/dL


(70-99)








Assessment/Plan


s/p I and D


will ask wound care to evaluate


Problems:  











NISHA RAMOS MD Jul 31, 2017 08:37

## 2017-07-31 NOTE — RAD
CT abdomen and pelvis with contrast 7/31/2017



Indication: Abscess.



Comparison: Ultrasound pelvis 7/30/2017.



Technique: CT helical acquisition of the abdomen and pelvis is obtained

following uneventful intravenous administration of 75 mL Omnipaque 300.

Coronal and sagittal reformations were obtained.



PQRS Compliance Statement:



One or more of the following individualized dose reduction techniques were

utilized for this examination:

1. Automated exposure control

2. Adjustment of the mA and/or kV according to patient size

3. Use of iterative reconstruction technique



Findings:



Abdomen and pelvis:



Minimal subpleural groundglass opacities in dependent visualized lower lobes,

likely atelectasis. Heart size is normal.



Liver, gallbladder, spleen, adrenal glands, pancreas and kidneys are grossly

unremarkable, however limited due to body habitus. No intra or extra hepatic

biliary ductal dilatation.



Abdominal aorta is normal in caliber.



No bowel obstruction. No abdominal free fluid. No retroperitoneal or

mesenteric lymphadenopathy.



Urinary bladder is decompressed. Uterus is present with 4.2 cm probable

fibroid posteriorly at the uterine fundus. The ovaries are present though

incompletely evaluated on CT. No iliac or inguinal lymphadenopathy.



No destructive osseous lesions.



Impression: Somewhat limited exam due to body habitus, no intra-abdominal

loculated fluid collection to suggest abscess.

## 2017-07-31 NOTE — PDOC
PROGRESS NOTES


Chief Complaint


Chief Complaint


 Worsening abscess right groin/thigh area. s/p excisional debridement of 

necrotic tissue down to adipose tissue of right groin, and I and D, 7/29. Intra-

op cultures pending 


 -s/p previous I and D on 7/25. group B Strep, anaerobic cx still pending.


sepsis


 -h/o pre-op steroids on 7/25 


Diabetes uncontrolled


Super morbid obesity, BMI 62








plan:


fu with sx ,id


on vanco, zosyn now, fu wound cx


add levemir 20u qhs, aspart 5u tid, ssi


pain control


wound care


dvt ppx





History of Present Illness


History of Present Illness


severe pain when move


still wound drain





hyperglycemia





Vitals


Vitals





Vital Signs








  Date Time  Temp Pulse Resp B/P (MAP) Pulse Ox O2 Delivery O2 Flow Rate FiO2


 


7/31/17 11:00 97.9 96 20 174/107 (129) 94 Room Air  





 97.9       


 


7/30/17 08:16       2.0 











Physical Exam


General:  Alert, Oriented X3, Cooperative


Heart:  Regular rate


Lungs:  Clear, Other (No RRW)


Abdomen:  Normal bowel sounds, Soft, No tenderness, No masses, Other (pelvic: 

deferred.)


Extremities:  No clubbing, No cyanosis


Skin:  Other (dressing intact)





Labs


LABS





Laboratory Tests








Test


  7/30/17


16:17 7/30/17


21:21 7/31/17


07:19 7/31/17


10:39


 


Glucose (Fingerstick)


  295 mg/dL


(70-99) 307 mg/dL


(70-99) 297 mg/dL


(70-99) 333 mg/dL


(70-99)











Review of Systems


Review of Systems


no fever, chills, sob or chest pain





Comment


Review of Relevant


I have reviewed the following items lana (where applicable) has been applied.


Labs





Laboratory Tests








Test


  7/29/17


16:59 7/29/17


19:32 7/29/17


21:37 7/30/17


07:06


 


Glucose (Fingerstick)


  261 mg/dL


(70-99) 231 mg/dL


(70-99) 240 mg/dL


(70-99) 319 mg/dL


(70-99)


 


Test


  7/30/17


10:22 7/30/17


16:17 7/30/17


21:21 7/31/17


07:19


 


Glucose (Fingerstick)


  331 mg/dL


(70-99) 295 mg/dL


(70-99) 307 mg/dL


(70-99) 297 mg/dL


(70-99)


 


Test


  7/31/17


10:39 


  


  


 


 


Glucose (Fingerstick)


  333 mg/dL


(70-99) 


  


  


 








Laboratory Tests








Test


  7/30/17


16:17 7/30/17


21:21 7/31/17


07:19 7/31/17


10:39


 


Glucose (Fingerstick)


  295 mg/dL


(70-99) 307 mg/dL


(70-99) 297 mg/dL


(70-99) 333 mg/dL


(70-99)








Microbiology


7/29/17 Gram Stain - Final, Complete


Medications





Current Medications


Sodium Chloride 500 ml @  500 mls/hr 1X  ONCE IV  Last administered on 7/28/ 17at 19:57;  Start 7/28/17 at 20:15;  Stop 7/28/17 at 21:14;  Status DC


Vancomycin HCl (Vanco Per Pharmacy) 1 each PRN DAILY  PRN MC SEE COMMENTS Last 

administered on 7/29/17at 08:57;  Start 7/28/17 at 20:15;  Stop 7/29/17 at 12:34

;  Status DC


Ondansetron HCl (Zofran) 4 mg PRN Q8HRS  PRN IV NAUSEA/VOMITING;  Start 7/28/17 

at 20:15;  Stop 7/29/17 at 19:42;  Status DC


Morphine Sulfate 2 mg PRN Q2HR  PRN IV PAIN;  Start 7/28/17 at 20:15;  Stop 7/29 /17 at 20:14;  Status DC


Sodium Chloride 1,000 ml @  100 mls/hr Q10H IV  Last administered on 7/29/17at 

12:07;  Start 7/28/17 at 20:13;  Stop 7/29/17 at 20:12;  Status DC


Vancomycin HCl 2 gm/Sodium Chloride 500 ml @  250 mls/hr 1X  ONCE IV  Last 

administered on 7/28/17at 20:33;  Start 7/28/17 at 21:00;  Stop 7/28/17 at 22:59

;  Status DC


Insulin Human Regular (NovoLIN R VIAL) 10 unit 1X  ONCE IV ;  Start 7/28/17 at 

21:15;  Stop 7/28/17 at 22:58;  Status DC


Albuterol/ Ipratropium (Duoneb) 3 ml 1X  ONCE NEB  Last administered on 7/28/ 17at 21:57;  Start 7/28/17 at 21:30;  Stop 7/28/17 at 21:31;  Status DC


Vancomycin HCl 1 each 1X  ONCE MC ;  Start 7/29/17 at 20:30;  Stop 7/29/17 at 20

:30;  Status DC


Vancomycin HCl 1.75 gm/Sodium Chloride 500 ml @  250 mls/hr Q8H IV  Last 

administered on 7/29/17at 05:36;  Start 7/29/17 at 05:00;  Stop 7/29/17 at 12:33

;  Status DC


Insulin Aspart (NovoLOG) 0-9 UNITS TIDWMEALS SQ  Last administered on 7/30/17at 

17:10;  Start 7/29/17 at 08:00;  Stop 7/30/17 at 21:30;  Status DC


Dextrose (Dextrose 50%-Water Syringe) 12.5 gm PRN Q15MIN  PRN IV SEE COMMENTS;  

Start 7/28/17 at 23:00


Insulin Aspart (NovoLOG) 10 units 1X  ONCE SQ  Last administered on 7/28/17at 23

:34;  Start 7/28/17 at 23:15;  Stop 7/28/17 at 23:16;  Status DC


Piperacillin Sod/ Tazobactam Sod 3.375 gm/Sodium Chloride 50 ml @  100 mls/hr 

Q6HRS IV  Last administered on 7/31/17at 12:13;  Start 7/29/17 at 12:00


Fentanyl Citrate (Fentanyl 2ml Vial) 100 mcg STK-MED ONCE .ROUTE ;  Start 7/29/ 17 at 18:37;  Stop 7/29/17 at 18:38;  Status DC


Sevoflurane (Ultane) 15 ml STK-MED ONCE IH ;  Start 7/29/17 at 18:37;  Stop 7/29 /17 at 18:38;  Status DC


Propofol 20 ml @ As Directed STK-MED ONCE IV ;  Start 7/29/17 at 18:37;  Stop 7/ 29/17 at 18:38;  Status DC


Dexamethasone Sodium Phosphate (Decadron) 20 mg STK-MED ONCE .ROUTE ;  Start 7/ 29/17 at 18:37;  Stop 7/29/17 at 18:38;  Status DC


Ondansetron HCl (Zofran) 4 mg STK-MED ONCE .ROUTE ;  Start 7/29/17 at 18:37;  

Stop 7/29/17 at 18:38;  Status DC


Lidocaine HCl (Lidocaine Pf 2% Vial) 5 ml STK-MED ONCE .ROUTE ;  Start 7/29/17 

at 18:37;  Stop 7/29/17 at 18:38;  Status DC


Ondansetron HCl (Zofran) 4 mg PRN Q6HRS  PRN IV NAUSEA/VOMITING;  Start 7/29/17 

at 19:00;  Stop 7/29/17 at 23:59;  Status DC


Fentanyl Citrate (Fentanyl 2ml Vial) 25 mcg PRN Q5MIN  PRN IV MILD PAIN;  Start 

7/29/17 at 19:00;  Stop 7/29/17 at 23:59;  Status DC


Fentanyl Citrate (Fentanyl 2ml Vial) 50 mcg PRN Q5MIN  PRN IV MODERATE PAIN 

Last administered on 7/29/17at 20:04;  Start 7/29/17 at 19:00;  Stop 7/29/17 at 

23:59;  Status DC


Morphine Sulfate 1 mg PRN Q10MIN  PRN IV SEVERE PAIN Last administered on 7/29/ 17at 19:50;  Start 7/29/17 at 19:00;  Stop 7/29/17 at 23:59;  Status DC


Ringer's Solution 1,000 ml @  30 mls/hr Q24H IV  Last administered on 7/29/17at 

18:55;  Start 7/29/17 at 19:00;  Stop 7/29/17 at 23:59;  Status DC


Lidocaine HCl 2 ml PRN 1X  PRN ID PRIOR TO IV START;  Start 7/29/17 at 19:00;  

Stop 7/29/17 at 23:59;  Status DC


Hydromorphone HCl (Dilaudid) 0.5 mg PRN Q10MIN  PRN IV SEV PAIN, Second choice;

  Start 7/29/17 at 19:00;  Stop 7/29/17 at 23:59;  Status DC


Prochlorperazine Edisylate (Compazine) 5 mg PACU PRN  PRN IV NAUSEA, MRX1;  

Start 7/29/17 at 19:00;  Stop 7/29/17 at 23:59;  Status DC


Enoxaparin Sodium (Lovenox 60mg Syringe) 60 mg Q12HR SQ ;  Start 7/30/17 at 09:

00;  Stop 7/30/17 at 11:57;  Status DC


Sodium Chloride (Normal Saline Flush) 3 ml QSHIFT  PRN IV AFTER MEDS AND BLOOD 

DRAWS;  Start 7/29/17 at 19:30


Acetaminophen/ Hydrocodone Bitart (Lortab 5/325) 1 tab PRN Q4HRS  PRN PO MILD 

PAIN Last administered on 7/31/17at 05:35;  Start 7/29/17 at 19:30


Senna/Docusate Sodium (Senna Plus) 1 tab BID PO  Last administered on 7/31/17at 

08:23;  Start 7/29/17 at 21:00


Ondansetron HCl (Zofran) 4 mg PRN Q6HRS  PRN IV NAUESA, 1ST CHOICE;  Start 7/29/ 17 at 19:30


Insulin Aspart (NovoLOG VIAL) 4 unit 1X  ONCE SQ  Last administered on 7/29/ 17at 19:47;  Start 7/29/17 at 19:45;  Stop 7/29/17 at 19:46;  Status DC


Morphine Sulfate 2 mg STK-MED ONCE .ROUTE ;  Start 7/29/17 at 19:39;  Stop 7/29/ 17 at 19:40;  Status DC


Insulin Aspart (NovoLOG VIAL) 100 unit STK-MED ONCE SQ ;  Start 7/29/17 at 19:39

;  Stop 7/29/17 at 19:40;  Status DC


Insulin Aspart (NovoLOG) 0-9 UNITS QIDACHS SQ ;  Start 7/31/17 at 07:30;  Stop 7 /31/17 at 07:30;  Status DC


Insulin Aspart (NovoLOG) 0-9 UNITS QIDACHS SQ  Last administered on 7/31/17at 12

:23;  Start 7/30/17 at 22:00


Iohexol (Omnipaque 240 Mg/ml) 30 ml 1X  ONCE PO  Last administered on 7/31/17at 

11:52;  Start 7/31/17 at 10:00;  Stop 7/31/17 at 10:01;  Status DC


Iohexol (Omnipaque 300 Mg/ml) 75 ml 1X  ONCE IV  Last administered on 7/31/17at 

11:51;  Start 7/31/17 at 10:00;  Stop 7/31/17 at 10:01;  Status DC


Insulin Detemir (Levemir) 20 units QHS SQ ;  Start 7/31/17 at 21:00


Insulin Detemir (Levemir) 10 units 1X  STAT SQ  Last administered on 7/31/17at 

12:23;  Start 7/31/17 at 10:06;  Stop 7/31/17 at 10:09;  Status DC


Medroxyprogesterone Acetate (Provera) 10 mg BID PO  Last administered on 7/31/ 17at 12:13;  Start 7/31/17 at 12:00





Active Scripts


Active


Reported


Norco 5-325 Tablet (Acetaminophen/Hydrocodone Bitart) 1 Each Tablet 1-2 Tab PO 

PRN Q4-6HRS PRN


     LAST DOSE GIVEN:


     DATE:


     TIME:


Bactrim Ds Tablet (Sulfamethoxazole/Trimethoprim) 1 Each Tablet 1 Tab PO BID


Aleve (Naproxen Sodium) 220 Mg Capsule 440 Mg PO BID


Vitals/I & O





Vital Sign - Last 24 Hours








 7/30/17 7/30/17 7/30/17 7/30/17





 15:09 19:00 19:54 23:23


 


Temp 97.7 98.7  





 97.7 98.7  


 


Pulse 94 101  


 


Resp 20 18  18


 


B/P (MAP) 138/85 (102) 143/89 (107)  


 


Pulse Ox 97 99  99


 


O2 Delivery Room Air Room Air Room Air Room Air


 


    





    





 7/30/17 7/31/17 7/31/17 7/31/17





 23:26 05:35 06:46 06:48


 


Temp 98.6   98.2





 98.6   98.2


 


Pulse 98   101


 


Resp  18 18 


 


B/P (MAP) 126/75 (92)   134/78 (96)


 


Pulse Ox 97 97 97 98


 


O2 Delivery Room Air Room Air Room Air Room Air


 


    





    





 7/31/17 7/31/17  





 08:00 11:00  


 


Temp  97.9  





  97.9  


 


Pulse  96  


 


Resp  20  


 


B/P (MAP)  174/107 (129)  


 


Pulse Ox  94  


 


O2 Delivery Room Air Room Air  














Intake and Output   


 


 7/30/17 7/30/17 7/31/17





 15:00 23:00 07:00


 


Intake Total 600 ml 2020 ml 220 ml


 


Balance 600 ml 2020 ml 220 ml

















OLLIE DEWEY MD Jul 31, 2017 13:30

## 2017-07-31 NOTE — PDOC2
CONSULT


Date of Consult


Date of Consult


DATE: 7/31/17 


TIME: 11:29





Reason for Consult


Reason for Consult:


heavy, vaginal bleeding





Referring Physician


Referring Physician:


Dr. Coffey





Identification/Chief Complaint


Chief Complaint


Groin abscess


Problems:  





History of Present Illness


Reason for Visit:


36 y/o G1A1 presented to ED with c/o groin abscess and heavy, vaginal bleeding 

for the past 30 days.  She normally has menses every 30 days, lasting up to 9 

days.  She has h/o ectopic pregnancy and desires future pregnancy.  Pelvic sono 

indicated fibroid uterus and was relayed to the patient.





Past Medical History


Cardiovascular:  HTN


Endocrine:  Diabetes





Past Surgical History


Past Surgical History:  Other (I&D, female surgery)





Family History


Family History:  Diabetes





Social History


No


ALCOHOL:  rare


Drugs:  None


Lives:  Alone





Current Medications


Current Medications





Current Medications


Sodium Chloride 500 ml @  500 mls/hr 1X  ONCE IV  Last administered on 7/28/ 17at 19:57;  Start 7/28/17 at 20:15;  Stop 7/28/17 at 21:14;  Status DC


Vancomycin HCl (Vanco Per Pharmacy) 1 each PRN DAILY  PRN MC SEE COMMENTS Last 

administered on 7/29/17at 08:57;  Start 7/28/17 at 20:15;  Stop 7/29/17 at 12:34

;  Status DC


Ondansetron HCl (Zofran) 4 mg PRN Q8HRS  PRN IV NAUSEA/VOMITING;  Start 7/28/17 

at 20:15;  Stop 7/29/17 at 19:42;  Status DC


Morphine Sulfate 2 mg PRN Q2HR  PRN IV PAIN;  Start 7/28/17 at 20:15;  Stop 7/29 /17 at 20:14;  Status DC


Sodium Chloride 1,000 ml @  100 mls/hr Q10H IV  Last administered on 7/29/17at 

12:07;  Start 7/28/17 at 20:13;  Stop 7/29/17 at 20:12;  Status DC


Vancomycin HCl 2 gm/Sodium Chloride 500 ml @  250 mls/hr 1X  ONCE IV  Last 

administered on 7/28/17at 20:33;  Start 7/28/17 at 21:00;  Stop 7/28/17 at 22:59

;  Status DC


Insulin Human Regular (NovoLIN R VIAL) 10 unit 1X  ONCE IV ;  Start 7/28/17 at 

21:15;  Stop 7/28/17 at 22:58;  Status DC


Albuterol/ Ipratropium (Duoneb) 3 ml 1X  ONCE NEB  Last administered on 7/28/ 17at 21:57;  Start 7/28/17 at 21:30;  Stop 7/28/17 at 21:31;  Status DC


Vancomycin HCl 1 each 1X  ONCE MC ;  Start 7/29/17 at 20:30;  Stop 7/29/17 at 20

:30;  Status DC


Vancomycin HCl 1.75 gm/Sodium Chloride 500 ml @  250 mls/hr Q8H IV  Last 

administered on 7/29/17at 05:36;  Start 7/29/17 at 05:00;  Stop 7/29/17 at 12:33

;  Status DC


Insulin Aspart (NovoLOG) 0-9 UNITS TIDWMEALS SQ  Last administered on 7/30/17at 

17:10;  Start 7/29/17 at 08:00;  Stop 7/30/17 at 21:30;  Status DC


Dextrose (Dextrose 50%-Water Syringe) 12.5 gm PRN Q15MIN  PRN IV SEE COMMENTS;  

Start 7/28/17 at 23:00


Insulin Aspart (NovoLOG) 10 units 1X  ONCE SQ  Last administered on 7/28/17at 23

:34;  Start 7/28/17 at 23:15;  Stop 7/28/17 at 23:16;  Status DC


Piperacillin Sod/ Tazobactam Sod 3.375 gm/Sodium Chloride 50 ml @  100 mls/hr 

Q6HRS IV  Last administered on 7/31/17at 05:31;  Start 7/29/17 at 12:00


Fentanyl Citrate (Fentanyl 2ml Vial) 100 mcg STK-MED ONCE .ROUTE ;  Start 7/29/ 17 at 18:37;  Stop 7/29/17 at 18:38;  Status DC


Sevoflurane (Ultane) 15 ml STK-MED ONCE IH ;  Start 7/29/17 at 18:37;  Stop 7/29 /17 at 18:38;  Status DC


Propofol 20 ml @ As Directed STK-MED ONCE IV ;  Start 7/29/17 at 18:37;  Stop 7/ 29/17 at 18:38;  Status DC


Dexamethasone Sodium Phosphate (Decadron) 20 mg STK-MED ONCE .ROUTE ;  Start 7/ 29/17 at 18:37;  Stop 7/29/17 at 18:38;  Status DC


Ondansetron HCl (Zofran) 4 mg STK-MED ONCE .ROUTE ;  Start 7/29/17 at 18:37;  

Stop 7/29/17 at 18:38;  Status DC


Lidocaine HCl (Lidocaine Pf 2% Vial) 5 ml STK-MED ONCE .ROUTE ;  Start 7/29/17 

at 18:37;  Stop 7/29/17 at 18:38;  Status DC


Ondansetron HCl (Zofran) 4 mg PRN Q6HRS  PRN IV NAUSEA/VOMITING;  Start 7/29/17 

at 19:00;  Stop 7/29/17 at 23:59;  Status DC


Fentanyl Citrate (Fentanyl 2ml Vial) 25 mcg PRN Q5MIN  PRN IV MILD PAIN;  Start 

7/29/17 at 19:00;  Stop 7/29/17 at 23:59;  Status DC


Fentanyl Citrate (Fentanyl 2ml Vial) 50 mcg PRN Q5MIN  PRN IV MODERATE PAIN 

Last administered on 7/29/17at 20:04;  Start 7/29/17 at 19:00;  Stop 7/29/17 at 

23:59;  Status DC


Morphine Sulfate 1 mg PRN Q10MIN  PRN IV SEVERE PAIN Last administered on 7/29/ 17at 19:50;  Start 7/29/17 at 19:00;  Stop 7/29/17 at 23:59;  Status DC


Ringer's Solution 1,000 ml @  30 mls/hr Q24H IV  Last administered on 7/29/17at 

18:55;  Start 7/29/17 at 19:00;  Stop 7/29/17 at 23:59;  Status DC


Lidocaine HCl 2 ml PRN 1X  PRN ID PRIOR TO IV START;  Start 7/29/17 at 19:00;  

Stop 7/29/17 at 23:59;  Status DC


Hydromorphone HCl (Dilaudid) 0.5 mg PRN Q10MIN  PRN IV SEV PAIN, Second choice;

  Start 7/29/17 at 19:00;  Stop 7/29/17 at 23:59;  Status DC


Prochlorperazine Edisylate (Compazine) 5 mg PACU PRN  PRN IV NAUSEA, MRX1;  

Start 7/29/17 at 19:00;  Stop 7/29/17 at 23:59;  Status DC


Enoxaparin Sodium (Lovenox 60mg Syringe) 60 mg Q12HR SQ ;  Start 7/30/17 at 09:

00;  Stop 7/30/17 at 11:57;  Status DC


Sodium Chloride (Normal Saline Flush) 3 ml QSHIFT  PRN IV AFTER MEDS AND BLOOD 

DRAWS;  Start 7/29/17 at 19:30


Acetaminophen/ Hydrocodone Bitart (Lortab 5/325) 1 tab PRN Q4HRS  PRN PO MILD 

PAIN Last administered on 7/31/17at 05:35;  Start 7/29/17 at 19:30


Senna/Docusate Sodium (Senna Plus) 1 tab BID PO  Last administered on 7/31/17at 

08:23;  Start 7/29/17 at 21:00


Ondansetron HCl (Zofran) 4 mg PRN Q6HRS  PRN IV NAUESA, 1ST CHOICE;  Start 7/29/ 17 at 19:30


Insulin Aspart (NovoLOG VIAL) 4 unit 1X  ONCE SQ  Last administered on 7/29/ 17at 19:47;  Start 7/29/17 at 19:45;  Stop 7/29/17 at 19:46;  Status DC


Morphine Sulfate 2 mg STK-MED ONCE .ROUTE ;  Start 7/29/17 at 19:39;  Stop 7/29/ 17 at 19:40;  Status DC


Insulin Aspart (NovoLOG VIAL) 100 unit STK-MED ONCE SQ ;  Start 7/29/17 at 19:39

;  Stop 7/29/17 at 19:40;  Status DC


Insulin Aspart (NovoLOG) 0-9 UNITS QIDACHS SQ ;  Start 7/31/17 at 07:30;  Stop 7 /31/17 at 07:30;  Status DC


Insulin Aspart (NovoLOG) 0-9 UNITS QIDACHS SQ  Last administered on 7/31/17at 08

:27;  Start 7/30/17 at 22:00


Iohexol (Omnipaque 240 Mg/ml) 30 ml 1X  ONCE PO ;  Start 7/31/17 at 10:00;  

Stop 7/31/17 at 10:01;  Status DC


Iohexol (Omnipaque 300 Mg/ml) 75 ml 1X  ONCE IV ;  Start 7/31/17 at 10:00;  

Stop 7/31/17 at 10:01;  Status DC


Insulin Detemir (Levemir) 20 units QHS SQ ;  Start 7/31/17 at 21:00


Insulin Detemir (Levemir) 10 units 1X  STAT SQ ;  Start 7/31/17 at 10:06;  Stop 

7/31/17 at 10:09;  Status DC





Active Scripts


Active


Reported


Norco 5-325 Tablet (Acetaminophen/Hydrocodone Bitart) 1 Each Tablet 1-2 Tab PO 

PRN Q4-6HRS PRN


     LAST DOSE GIVEN:


     DATE:


     TIME:


Bactrim Ds Tablet (Sulfamethoxazole/Trimethoprim) 1 Each Tablet 1 Tab PO BID


Aleve (Naproxen Sodium) 220 Mg Capsule 440 Mg PO BID





Allergies


Allergies:  


Coded Allergies:  


     No Known Drug Allergies (Unverified , 7/29/17)





ROS


General:  YES: Fatigue, 


   No: Chills, Night Sweats, Malaise, Appetite, Other


PSYCHOLOGICAL ROS:  No: Anxiety, Behavioral Disorder, Concentration difficultie

, Decreased libido, Depression, Disorientation, Hallucinations, Hostility, 

Irritablity, Memory difficulties, Mood Swings, Obsessive thoughts, Physical 

abuse, Sexual abuse, Sleep disturbances, Suicidal ideation, Other


Eyes:  No Blurry vision, No Decreased vision, No Double vision, No Dry eyes, No 

Excessive tearing, No Eye Pain, No Itchy Eyes, No Loss of vision, No Photophobia

, No Scotomata, No Uses contacts, No Uses glasses, No Other


HEENT:  No: Heacaches, Visual Changes, Hearing change, Nasal congestion, Nasal 

discharge, Oral lesions, Sinus pain, Sore Throat, Epistaxis, Sneezing, Snoring, 

Tinnitus, Vertigo, Vocal changes, Other


ALLERGY AND IMMUNOLOGY:  No: Hives, Insect Bite Sensitivity, Itchy/Watery Eyes, 

Nasal Congestion, Post Nasal Drip, Seasonal Allergies, Other


Hematological and Lymphatic:  No: Bleeding Problems, Blood Clots, Blood 

Transfusions, Brusing, Night Sweats, Pallor, Swollen Lymph Nodes, Other


ENDOCRINE:  No: Breast Changes, Galactorrhea, Hair Pattern Changes, Hot Flashes

, Malaise/lethargy, Mood Swings, Palpitations, Polydipsia/polyuria, Skin Changes

, Temperature Intolerance, Unexpected Weight Changes, Other


Breast:  No New/Changing Breast Lumps, No Nipple changes, No Nipple discharge, 

No Other


Respiratory:  No: Cough, Hemoptysis, Orthopnea, Pleuritic Pain, Shortness of 

breath, SOB with excertion, Sputum Changes, Stridor, Tachypnea, Wheezing, Other


Cardiovascular:  No Chest Pain, No Palpitations, No Orthopnea, No Paroxysmal 

Noc. Dyspnea, No Edema, No Lt Headedness, No Other


Gastrointestinal:  No Nausea, No Vomiting, No Abdominal Pain, No Diarrhea, No 

Constipation, No Melena, No Hematochezia, No Other


Genitourinary:  YES Other (vaginal bleeding), 


   No Dysuria, No Frequency, No Incontinence, No Hematuria, No Retention, No 

Discharge, No Urgency, No Pain, No Flank Pain, No , No , No , No , No , No , No 


Musculoskeletal:  No Gait Disturbance, No Joint Pain, No Joint Stiffness, No 

Joint Swelling, No Muscle Pain, No Muscular Weakness, No Pain In:, No Swelling 

In:, No Other


Neurological:  No Behavorial Changes, No Bowel/Bladder ControlChng, No Confusion

, No Dizziness, No Gait Disturbance, No Headaches, No Impaired Coord/balance, 

No Memory Loss, No Numbness/Tingling, No Seizures, No Speech Problems, No 

Tremors, No Visual Changes, No Weakness, No Other





Physical Exam


General:  Alert, Oriented X3, Cooperative


HEENT:  Atraumatic


Lungs:  Clear to auscultation


Heart:  Regular rate


Abdomen:  Normal bowel sounds, Soft, No tenderness, No masses, Other (pelvic: 

deferred.)





Vitals


VITALS





Vital Signs








  Date Time  Temp Pulse Resp B/P (MAP) Pulse Ox O2 Delivery O2 Flow Rate FiO2


 


7/31/17 11:00 97.9 96 20 174/107 (129) 94 Room Air  





 97.9       


 


7/30/17 08:16       2.0 











Labs


Labs





Laboratory Tests








Test


  7/29/17


16:59 7/29/17


19:32 7/29/17


21:37 7/30/17


07:06


 


Glucose (Fingerstick)


  261 mg/dL


(70-99) 231 mg/dL


(70-99) 240 mg/dL


(70-99) 319 mg/dL


(70-99)


 


Test


  7/30/17


10:22 7/30/17


16:17 7/30/17


21:21 7/31/17


07:19


 


Glucose (Fingerstick)


  331 mg/dL


(70-99) 295 mg/dL


(70-99) 307 mg/dL


(70-99) 297 mg/dL


(70-99)


 


Test


  7/31/17


10:39 


  


  


 


 


Glucose (Fingerstick)


  333 mg/dL


(70-99) 


  


  


 








Laboratory Tests








Test


  7/30/17


16:17 7/30/17


21:21 7/31/17


07:19 7/31/17


10:39


 


Glucose (Fingerstick)


  295 mg/dL


(70-99) 307 mg/dL


(70-99) 297 mg/dL


(70-99) 333 mg/dL


(70-99)











Assessment/Plan


Assessment/Plan


A: AUB


    Obesity


    S/p groin I&D for abscess


P: Start OCP's to regulate menses.  Thank you for consult.











CHEN KAMARA Jr, MD Jul 31, 2017 11:37

## 2017-07-31 NOTE — PDOC
Infectious Disease Note


Subjective


Subjective


s/p I and D


Comfortable at the moment





ROS


ROS


GEN: Denies fevers, chills, sweats


HEENT: Denies blurred vision, sore throat


CV: Denies chest pain


RESP: Denies shortness of air, cough


GI: Denies n/v/d


NEURO: Denies confusion, dizziness


MSK: Denies weakness, joint pain/swelling





Vital Sign


Vital Signs





Vital Signs








  Date Time  Temp Pulse Resp B/P (MAP) Pulse Ox O2 Delivery O2 Flow Rate FiO2


 


7/31/17 06:48 98.2 101  134/78 (96) 98 Room Air  





 98.2       


 


7/31/17 06:46   18     


 


7/30/17 08:16       2.0 











Physical Exam


PHYSICAL EXAM


GENERAL:  NAD, Alert


HEENT:  PERRL, OC/OP


NECK:  Supple, no JVD, no LN


LUNGS:  Clear


HEART:  S1S2, no gallop, no murmur


ABD:  Soft, NT, no organomegaly, no rebound


EXT:  No edema, no cyanosis,, thigh inflammation +


CNS:  Alert, oriented x 3, no focal neurologic deficit


SKIN:  No rash


IV: ok





Labs


Lab





Laboratory Tests








Test


  7/30/17


10:22 7/30/17


16:17 7/30/17


21:21 7/31/17


07:19


 


Glucose (Fingerstick)


  331 mg/dL


(70-99) 295 mg/dL


(70-99) 307 mg/dL


(70-99) 297 mg/dL


(70-99)








Micro





  GRAM STAIN  Final  


        WBCS                        MANY


        RBCS                        MANY


        GRAM POSITIVE COCCI         MANY





        MANY TINY GRAM POSITIVE COCCI AND


        MANY TINY GRAM NEGATIVE RODS,


        SUGGESTIVE OF ANAEROBES.





Objective


Assessment


Worsening abscess right groin/thigh area. s/p excisional debridement of 

necrotic tissue down to adipose tissue of right groin, and I and D, 7/29. Intra-

op cultures pending 


 -s/p previous I and D on 7/25. group B Strep, anaerobic cx still pending.


Leukocytosis 


 -h/o pre-op steroids on 7/25 


Diabetes


Super morbid obesity, BMI 62





Plan


Plan of Care


vanc and Zosyn.


Intra-op cultures pending 


Supportive care 





ct abd and pelvis











JEREMIAH CISSE MD Jul 31, 2017 09:02

## 2017-07-31 NOTE — RAD
Pelvic ultrasound, 7/30/2017:



History: Heavy vaginal bleeding



Transabdominal scans were obtained.



The uterus measures 8.9 x 6.5 x 5.5 cm. There is a 4.9 cm hypoechoic mass in

the superior aspect of the uterus. This probably a fibroid. The central

uterine echo complex was not delineated in this large patient. She refused

transvaginal scanning.



The ovaries are of normal size. No adnexal mass is seen. No free fluid is

evident in the pelvis.





IMPRESSION: Limited exam demonstrating a single uterine mass compatible with a

fibroid.

## 2017-08-01 VITALS — SYSTOLIC BLOOD PRESSURE: 149 MMHG | DIASTOLIC BLOOD PRESSURE: 86 MMHG

## 2017-08-01 VITALS — SYSTOLIC BLOOD PRESSURE: 136 MMHG | DIASTOLIC BLOOD PRESSURE: 88 MMHG

## 2017-08-01 VITALS — SYSTOLIC BLOOD PRESSURE: 152 MMHG | DIASTOLIC BLOOD PRESSURE: 76 MMHG

## 2017-08-01 VITALS — DIASTOLIC BLOOD PRESSURE: 86 MMHG | SYSTOLIC BLOOD PRESSURE: 162 MMHG

## 2017-08-01 VITALS — SYSTOLIC BLOOD PRESSURE: 146 MMHG | DIASTOLIC BLOOD PRESSURE: 92 MMHG

## 2017-08-01 LAB
ANION GAP SERPL CALC-SCNC: 10 MMOL/L (ref 6–14)
BASOPHILS # BLD AUTO: 0 X10^3/UL (ref 0–0.2)
BASOPHILS NFR BLD: 0 % (ref 0–3)
BUN SERPL-MCNC: 7 MG/DL (ref 7–20)
CALCIUM SERPL-MCNC: 8.4 MG/DL (ref 8.5–10.1)
CHLORIDE SERPL-SCNC: 102 MMOL/L (ref 98–107)
CO2 SERPL-SCNC: 27 MMOL/L (ref 21–32)
CREAT SERPL-MCNC: 0.8 MG/DL (ref 0.6–1)
EOSINOPHIL NFR BLD: 3 % (ref 0–3)
ERYTHROCYTE [DISTWIDTH] IN BLOOD BY AUTOMATED COUNT: 15.9 % (ref 11.5–14.5)
GFR SERPLBLD BASED ON 1.73 SQ M-ARVRAT: 80.7 ML/MIN
GLUCOSE SERPL-MCNC: 242 MG/DL (ref 70–99)
HCT VFR BLD CALC: 28.8 % (ref 36–47)
HGB BLD-MCNC: 9.2 G/DL (ref 12–15.5)
LYMPHOCYTES # BLD: 3.2 X10^3/UL (ref 1–4.8)
LYMPHOCYTES NFR BLD AUTO: 24 % (ref 24–48)
MCH RBC QN AUTO: 26 PG (ref 25–35)
MCHC RBC AUTO-ENTMCNC: 32 G/DL (ref 31–37)
MCV RBC AUTO: 80 FL (ref 79–100)
MONOCYTES NFR BLD: 8 % (ref 0–9)
NEUTROPHILS NFR BLD AUTO: 65 % (ref 31–73)
PLATELET # BLD AUTO: 405 X10^3/UL (ref 140–400)
POTASSIUM SERPL-SCNC: 3.6 MMOL/L (ref 3.5–5.1)
RBC # BLD AUTO: 3.6 X10^6/UL (ref 3.5–5.4)
SODIUM SERPL-SCNC: 139 MMOL/L (ref 136–145)
WBC # BLD AUTO: 13.3 X10^3/UL (ref 4–11)

## 2017-08-01 RX ADMIN — PIPERACILLIN SODIUM AND TAZOBACTAM SODIUM SCH MLS/HR: 3; .375 INJECTION, POWDER, LYOPHILIZED, FOR SOLUTION INTRAVENOUS at 11:59

## 2017-08-01 RX ADMIN — INSULIN ASPART SCH UNITS: 100 INJECTION, SOLUTION INTRAVENOUS; SUBCUTANEOUS at 12:08

## 2017-08-01 RX ADMIN — INSULIN ASPART SCH UNITS: 100 INJECTION, SOLUTION INTRAVENOUS; SUBCUTANEOUS at 12:09

## 2017-08-01 RX ADMIN — SENNOSIDES AND DOCUSATE SODIUM SCH TAB: 8.6; 5 TABLET ORAL at 08:16

## 2017-08-01 RX ADMIN — PIPERACILLIN SODIUM AND TAZOBACTAM SODIUM SCH MLS/HR: 3; .375 INJECTION, POWDER, LYOPHILIZED, FOR SOLUTION INTRAVENOUS at 05:43

## 2017-08-01 RX ADMIN — ENOXAPARIN SODIUM SCH MG: 100 INJECTION SUBCUTANEOUS at 20:47

## 2017-08-01 RX ADMIN — PIPERACILLIN SODIUM AND TAZOBACTAM SODIUM SCH MLS/HR: 3; .375 INJECTION, POWDER, LYOPHILIZED, FOR SOLUTION INTRAVENOUS at 22:29

## 2017-08-01 RX ADMIN — INSULIN ASPART SCH UNITS: 100 INJECTION, SOLUTION INTRAVENOUS; SUBCUTANEOUS at 08:24

## 2017-08-01 RX ADMIN — INSULIN ASPART SCH UNITS: 100 INJECTION, SOLUTION INTRAVENOUS; SUBCUTANEOUS at 22:46

## 2017-08-01 RX ADMIN — PIPERACILLIN SODIUM AND TAZOBACTAM SODIUM SCH MLS/HR: 3; .375 INJECTION, POWDER, LYOPHILIZED, FOR SOLUTION INTRAVENOUS at 12:00

## 2017-08-01 RX ADMIN — SENNOSIDES AND DOCUSATE SODIUM SCH TAB: 8.6; 5 TABLET ORAL at 20:34

## 2017-08-01 RX ADMIN — INSULIN ASPART SCH UNITS: 100 INJECTION, SOLUTION INTRAVENOUS; SUBCUTANEOUS at 17:58

## 2017-08-01 RX ADMIN — INSULIN ASPART SCH UNITS: 100 INJECTION, SOLUTION INTRAVENOUS; SUBCUTANEOUS at 17:57

## 2017-08-01 RX ADMIN — ENOXAPARIN SODIUM SCH MG: 100 INJECTION SUBCUTANEOUS at 08:17

## 2017-08-01 RX ADMIN — PIPERACILLIN SODIUM AND TAZOBACTAM SODIUM SCH MLS/HR: 3; .375 INJECTION, POWDER, LYOPHILIZED, FOR SOLUTION INTRAVENOUS at 00:11

## 2017-08-01 NOTE — PDOC
SURGICAL PROGRESS NOTE


Subjective


Pt resting comfortably


Vital Signs





Vital Signs








  Date Time  Temp Pulse Resp B/P (MAP) Pulse Ox O2 Delivery O2 Flow Rate FiO2


 


8/1/17 07:00 98.3 98 17 146/92 (110) 96 Room Air  





 98.3       








I&O











Intake and Output 


 


 8/1/17





 07:00


 


Intake Total 2550 ml


 


Balance 2550 ml


 


 


 


Intake Oral 2500 ml


 


IV Total 50 ml


 


# Voids 7








Labs





Laboratory Tests








Test


  7/30/17


10:22 7/30/17


16:17 7/30/17


21:21 7/31/17


07:19


 


Glucose (Fingerstick)


  331 mg/dL


(70-99) 295 mg/dL


(70-99) 307 mg/dL


(70-99) 297 mg/dL


(70-99)


 


Test


  7/31/17


10:39 7/31/17


16:57 7/31/17


20:26 8/1/17


03:00


 


Glucose (Fingerstick)


  333 mg/dL


(70-99) 316 mg/dL


(70-99) 326 mg/dL


(70-99) 


 


 


White Blood Count


  


  


  


  13.3 x10^3/uL


(4.0-11.0)


 


Red Blood Count


  


  


  


  3.60 x10^6/uL


(3.50-5.40)


 


Hemoglobin


  


  


  


  9.2 g/dL


(12.0-15.5)


 


Hematocrit


  


  


  


  28.8 %


(36.0-47.0)


 


Mean Corpuscular Volume    80 fL () 


 


Mean Corpuscular Hemoglobin    26 pg (25-35) 


 


Mean Corpuscular Hemoglobin


Concent 


  


  


  32 g/dL


(31-37)


 


Red Cell Distribution Width


  


  


  


  15.9 %


(11.5-14.5)


 


Platelet Count


  


  


  


  405 x10^3/uL


(140-400)


 


Neutrophils (%) (Auto)    65 % (31-73) 


 


Lymphocytes (%) (Auto)    24 % (24-48) 


 


Monocytes (%) (Auto)    8 % (0-9) 


 


Eosinophils (%) (Auto)    3 % (0-3) 


 


Basophils (%) (Auto)    0 % (0-3) 


 


Neutrophils # (Auto)


  


  


  


  8.7 x10^3uL


(1.8-7.7)


 


Lymphocytes # (Auto)


  


  


  


  3.2 x10^3/uL


(1.0-4.8)


 


Monocytes # (Auto)


  


  


  


  1.0 x10^3/uL


(0.0-1.1)


 


Eosinophils # (Auto)


  


  


  


  0.4 x10^3/uL


(0.0-0.7)


 


Basophils # (Auto)


  


  


  


  0.0 x10^3/uL


(0.0-0.2)


 


Sodium Level


  


  


  


  139 mmol/L


(136-145)


 


Potassium Level


  


  


  


  3.6 mmol/L


(3.5-5.1)


 


Chloride Level


  


  


  


  102 mmol/L


()


 


Carbon Dioxide Level


  


  


  


  27 mmol/L


(21-32)


 


Anion Gap    10 (6-14) 


 


Blood Urea Nitrogen    7 mg/dL (7-20) 


 


Creatinine


  


  


  


  0.8 mg/dL


(0.6-1.0)


 


Estimated GFR


(Cockcroft-Gault) 


  


  


  80.7 


 


 


Glucose Level


  


  


  


  242 mg/dL


(70-99)


 


Calcium Level


  


  


  


  8.4 mg/dL


(8.5-10.1)


 


Test


  8/1/17


08:07 


  


  


 


 


Glucose (Fingerstick)


  220 mg/dL


(70-99) 


  


  


 








Laboratory Tests








Test


  7/31/17


10:39 7/31/17


16:57 7/31/17


20:26 8/1/17


03:00


 


Glucose (Fingerstick)


  333 mg/dL


(70-99) 316 mg/dL


(70-99) 326 mg/dL


(70-99) 


 


 


White Blood Count


  


  


  


  13.3 x10^3/uL


(4.0-11.0)


 


Red Blood Count


  


  


  


  3.60 x10^6/uL


(3.50-5.40)


 


Hemoglobin


  


  


  


  9.2 g/dL


(12.0-15.5)


 


Hematocrit


  


  


  


  28.8 %


(36.0-47.0)


 


Mean Corpuscular Volume    80 fL () 


 


Mean Corpuscular Hemoglobin    26 pg (25-35) 


 


Mean Corpuscular Hemoglobin


Concent 


  


  


  32 g/dL


(31-37)


 


Red Cell Distribution Width


  


  


  


  15.9 %


(11.5-14.5)


 


Platelet Count


  


  


  


  405 x10^3/uL


(140-400)


 


Neutrophils (%) (Auto)    65 % (31-73) 


 


Lymphocytes (%) (Auto)    24 % (24-48) 


 


Monocytes (%) (Auto)    8 % (0-9) 


 


Eosinophils (%) (Auto)    3 % (0-3) 


 


Basophils (%) (Auto)    0 % (0-3) 


 


Neutrophils # (Auto)


  


  


  


  8.7 x10^3uL


(1.8-7.7)


 


Lymphocytes # (Auto)


  


  


  


  3.2 x10^3/uL


(1.0-4.8)


 


Monocytes # (Auto)


  


  


  


  1.0 x10^3/uL


(0.0-1.1)


 


Eosinophils # (Auto)


  


  


  


  0.4 x10^3/uL


(0.0-0.7)


 


Basophils # (Auto)


  


  


  


  0.0 x10^3/uL


(0.0-0.2)


 


Sodium Level


  


  


  


  139 mmol/L


(136-145)


 


Potassium Level


  


  


  


  3.6 mmol/L


(3.5-5.1)


 


Chloride Level


  


  


  


  102 mmol/L


()


 


Carbon Dioxide Level


  


  


  


  27 mmol/L


(21-32)


 


Anion Gap    10 (6-14) 


 


Blood Urea Nitrogen    7 mg/dL (7-20) 


 


Creatinine


  


  


  


  0.8 mg/dL


(0.6-1.0)


 


Estimated GFR


(Cockcroft-Gault) 


  


  


  80.7 


 


 


Glucose Level


  


  


  


  242 mg/dL


(70-99)


 


Calcium Level


  


  


  


  8.4 mg/dL


(8.5-10.1)


 


Test


  8/1/17


08:07 


  


  


 


 


Glucose (Fingerstick)


  220 mg/dL


(70-99) 


  


  


 








Assessment/Plan


s/p i and d


appreciate wound care


cont supportive care


Problems:  











NISHA RAMOS MD Aug 1, 2017 10:03

## 2017-08-01 NOTE — PATHOLOGY
PATHOLOGY REPORT 

 

*    *    *    *    *    *    *    * 

 FINAL DIAGNOSIS:

Segments of skin and subcutaneous tissue, right groin incision and

drainage:

- Abscess. 

 

COMMENT:

There is no evidence of malignancy.  

(JPM:pit; 2017)

 

 

REPORT ELECTRONICALLY SIGNED BY:   Miah Singer M.D.

DATE/TIME:   2017 15:56

*    *    *    *    *    *    *    *

 

GROSS PATHOLOGY:

The specimen is received in formalin, designated "Ricardo Gay,

skin abscess right groin" and consists of multiple irregular segments

of skin and fatty appearing subcutaneous tissue, forming an aggregate

measurement of 6.4 x 4.8 x 2.0 cm.  Many of the segments have a gray

green, necrotic appearance.  Sectioning reveals lobulated fatty

appearing tissue and skin showing focal areas of hemorrhage and gray

green necrosis.  Representative sections are submitted in cassette A1.

(JPM; 17)

 

 

 INITIAL CPT CODE(S):

A; 79720

Professional services performed by [facility] [place] 

[place] [address]

 

  Technical services performed by [facility] at [address] [place] [address].

  

 SPECIMEN(S) RECEIVED:

A.Skin abscess, right groin

 

 CLINICAL HISTORY:

Thigh abscess 

 

 PATIENT:  RICARDO GAY

/AGE:  1980 (Age: 37)  

PATIENT #:  [id number]

ACCESSION #:  [id number]          ALT CASE #:   

SPECIMEN COLLECTION DATE:  2017

SPECIMEN RECEIVED DATE:  2017

   

[facility] - [address] [place] [address] - PHONE: 

[phone]

* * *  END OF REPORT  * * *

## 2017-08-01 NOTE — PDOC
Infectious Disease Note


Subjective


Subjective





Comfortable at the moment





ROS


ROS


GEN: Denies fevers, chills, sweats


HEENT: Denies blurred vision, sore throat


CV: Denies chest pain


RESP: Denies shortness of air, cough


GI: Denies n/v/d


NEURO: Denies confusion, dizziness


MSK: Denies weakness, joint pain/swelling





Vital Sign


Vital Signs





Vital Signs








  Date Time  Temp Pulse Resp B/P (MAP) Pulse Ox O2 Delivery O2 Flow Rate FiO2


 


7/31/17 22:50 97.7 95 18 143/91 (108) 96 Room Air  





 97.7       











Physical Exam


PHYSICAL EXAM


GENERAL:  NAD, Alert


HEENT:  PERRL, OC/OP


NECK:  Supple, no JVD, no LN


LUNGS:  Clear


HEART:  S1S2, no gallop, no murmur


ABD:  Soft, NT, no organomegaly, no rebound


EXT:  No edema, no cyanosis,, rt thigh still a lot of induration, tenderness 

and drainage +


CNS:  Alert, oriented x 3, no focal neurologic deficit


SKIN:  No rash


IV: ok





Labs


Lab





Laboratory Tests








Test


  7/31/17


10:39 7/31/17


16:57 7/31/17


20:26 8/1/17


03:00


 


Glucose (Fingerstick)


  333 mg/dL


(70-99) 316 mg/dL


(70-99) 326 mg/dL


(70-99) 


 


 


White Blood Count


  


  


  


  13.3 x10^3/uL


(4.0-11.0)


 


Red Blood Count


  


  


  


  3.60 x10^6/uL


(3.50-5.40)


 


Hemoglobin


  


  


  


  9.2 g/dL


(12.0-15.5)


 


Hematocrit


  


  


  


  28.8 %


(36.0-47.0)


 


Mean Corpuscular Volume    80 fL () 


 


Mean Corpuscular Hemoglobin    26 pg (25-35) 


 


Mean Corpuscular Hemoglobin


Concent 


  


  


  32 g/dL


(31-37)


 


Red Cell Distribution Width


  


  


  


  15.9 %


(11.5-14.5)


 


Platelet Count


  


  


  


  405 x10^3/uL


(140-400)


 


Neutrophils (%) (Auto)    65 % (31-73) 


 


Lymphocytes (%) (Auto)    24 % (24-48) 


 


Monocytes (%) (Auto)    8 % (0-9) 


 


Eosinophils (%) (Auto)    3 % (0-3) 


 


Basophils (%) (Auto)    0 % (0-3) 


 


Neutrophils # (Auto)


  


  


  


  8.7 x10^3uL


(1.8-7.7)


 


Lymphocytes # (Auto)


  


  


  


  3.2 x10^3/uL


(1.0-4.8)


 


Monocytes # (Auto)


  


  


  


  1.0 x10^3/uL


(0.0-1.1)


 


Eosinophils # (Auto)


  


  


  


  0.4 x10^3/uL


(0.0-0.7)


 


Basophils # (Auto)


  


  


  


  0.0 x10^3/uL


(0.0-0.2)


 


Sodium Level


  


  


  


  139 mmol/L


(136-145)


 


Potassium Level


  


  


  


  3.6 mmol/L


(3.5-5.1)


 


Chloride Level


  


  


  


  102 mmol/L


()


 


Carbon Dioxide Level


  


  


  


  27 mmol/L


(21-32)


 


Anion Gap    10 (6-14) 


 


Blood Urea Nitrogen    7 mg/dL (7-20) 


 


Creatinine


  


  


  


  0.8 mg/dL


(0.6-1.0)


 


Estimated GFR


(Cockcroft-Gault) 


  


  


  80.7 


 


 


Glucose Level


  


  


  


  242 mg/dL


(70-99)


 


Calcium Level


  


  


  


  8.4 mg/dL


(8.5-10.1)


 


Test


  8/1/17


08:07 


  


  


 


 


Glucose (Fingerstick)


  220 mg/dL


(70-99) 


  


  


 








Micro


 ANAEROBIC-AEROBIC CULTURE  


                                PENDING





  ANAEROBIC RES 1  


                                PENDING





  AEROBIC CULT  Preliminary  


        Preliminary report





  AEROBIC RES 1  Preliminary  


        Comment





        Beta hemolytic Streptococcus, group B


        Heavy growth


        Penicillin and ampicillin are drugs of choice for treatment


        of beta-hemolytic streptococcal infections. Susceptibility


        testing of penicillins and other beta-lactam agents


        approved by the FDA for treatment of beta-hemolytic


        streptococcal infections need not be performed routinely


        because nonsusceptible isolates are extremely rare in any


        beta-hemolytic streptococcus and have not been reported


        for Streptococcus pyogenes (group A). (CLSI 2011)


        Performed at:  [facility]


        [address] [facility], [place], [place]  [id number]


        : Angie Watkins MD, Phone:  [phone]





Objective


Assessment


Worsening abscess right groin/thigh area. s/p excisional debridement of 

necrotic tissue down to adipose tissue of right groin, and I and D, 7/29. Intra-

op cultures Group B strep


 -s/p previous I and D on 7/25. group B Strep, anaerobic cx still pending.


Leukocytosis 


 -h/o pre-op steroids on 7/25 


Diabetes


Super morbid obesity, BMI 62





Plan


Plan of Care


rocephine, eventually d/c on po augmentin


Supportive care 





ct abd and pelvis neg











JEREMIAH CISSE MD Aug 1, 2017 08:57

## 2017-08-01 NOTE — PDOC
PROGRESS NOTES


Chief Complaint


Chief Complaint


 Worsening abscess right groin/thigh area. s/p excisional debridement of 

necrotic tissue down to adipose tissue of right groin, and I and D, 7/29. Intra-

op cultures pending 


 -s/p previous I and D on 7/25. group B Strep, anaerobic cx still pending.


sepsis


 -h/o pre-op steroids on 7/25 


Diabetes uncontrolled


Super morbid obesity, BMI 62








plan:


fu with sx ,id


dc vanco, on zosyn now, change to rocephin as per IDfu wound cx


increase levemir 25u qhs, aspart 8u tid, ssi


pain control


wound care


dvt ppx


hba1c pending





History of Present Illness


History of Present Illness


severe pain when move, slightly better


still wound drain





hyperglycemia





wbc still high at 13





Vitals


Vitals





Vital Signs








  Date Time  Temp Pulse Resp B/P (MAP) Pulse Ox O2 Delivery O2 Flow Rate FiO2


 


8/1/17 11:00 98.1 80 17 136/88 (104) 97 Room Air  





 98.1       











Physical Exam


General:  Alert, Oriented X3, Cooperative


Heart:  Regular rate


Lungs:  Clear, Other (No RRW)


Abdomen:  Normal bowel sounds, Soft, No tenderness, No masses, Other (pelvic: 

deferred.)


Extremities:  No clubbing, No cyanosis


Skin:  Other (dressing intact)





Labs


LABS





Laboratory Tests








Test


  7/31/17


16:57 7/31/17


20:26 8/1/17


03:00 8/1/17


08:07


 


Glucose (Fingerstick)


  316 mg/dL


(70-99) 326 mg/dL


(70-99) 


  220 mg/dL


(70-99)


 


White Blood Count


  


  


  13.3 x10^3/uL


(4.0-11.0) 


 


 


Red Blood Count


  


  


  3.60 x10^6/uL


(3.50-5.40) 


 


 


Hemoglobin


  


  


  9.2 g/dL


(12.0-15.5) 


 


 


Hematocrit


  


  


  28.8 %


(36.0-47.0) 


 


 


Mean Corpuscular Volume   80 fL ()  


 


Mean Corpuscular Hemoglobin   26 pg (25-35)  


 


Mean Corpuscular Hemoglobin


Concent 


  


  32 g/dL


(31-37) 


 


 


Red Cell Distribution Width


  


  


  15.9 %


(11.5-14.5) 


 


 


Platelet Count


  


  


  405 x10^3/uL


(140-400) 


 


 


Neutrophils (%) (Auto)   65 % (31-73)  


 


Lymphocytes (%) (Auto)   24 % (24-48)  


 


Monocytes (%) (Auto)   8 % (0-9)  


 


Eosinophils (%) (Auto)   3 % (0-3)  


 


Basophils (%) (Auto)   0 % (0-3)  


 


Neutrophils # (Auto)


  


  


  8.7 x10^3uL


(1.8-7.7) 


 


 


Lymphocytes # (Auto)


  


  


  3.2 x10^3/uL


(1.0-4.8) 


 


 


Monocytes # (Auto)


  


  


  1.0 x10^3/uL


(0.0-1.1) 


 


 


Eosinophils # (Auto)


  


  


  0.4 x10^3/uL


(0.0-0.7) 


 


 


Basophils # (Auto)


  


  


  0.0 x10^3/uL


(0.0-0.2) 


 


 


Sodium Level


  


  


  139 mmol/L


(136-145) 


 


 


Potassium Level


  


  


  3.6 mmol/L


(3.5-5.1) 


 


 


Chloride Level


  


  


  102 mmol/L


() 


 


 


Carbon Dioxide Level


  


  


  27 mmol/L


(21-32) 


 


 


Anion Gap   10 (6-14)  


 


Blood Urea Nitrogen   7 mg/dL (7-20)  


 


Creatinine


  


  


  0.8 mg/dL


(0.6-1.0) 


 


 


Estimated GFR


(Cockcroft-Gault) 


  


  80.7 


  


 


 


Glucose Level


  


  


  242 mg/dL


(70-99) 


 


 


Calcium Level


  


  


  8.4 mg/dL


(8.5-10.1) 


 


 


Test


  8/1/17


11:53 


  


  


 


 


Glucose (Fingerstick)


  289 mg/dL


(70-99) 


  


  


 











Review of Systems


Review of Systems


no fever, chills, sob or chest pain





Comment


Review of Relevant


I have reviewed the following items lana (where applicable) has been applied.


Labs





Laboratory Tests








Test


  7/30/17


16:17 7/30/17


21:21 7/31/17


07:19 7/31/17


10:39


 


Glucose (Fingerstick)


  295 mg/dL


(70-99) 307 mg/dL


(70-99) 297 mg/dL


(70-99) 333 mg/dL


(70-99)


 


Test


  7/31/17


16:57 7/31/17


20:26 8/1/17


03:00 8/1/17


08:07


 


Glucose (Fingerstick)


  316 mg/dL


(70-99) 326 mg/dL


(70-99) 


  220 mg/dL


(70-99)


 


White Blood Count


  


  


  13.3 x10^3/uL


(4.0-11.0) 


 


 


Red Blood Count


  


  


  3.60 x10^6/uL


(3.50-5.40) 


 


 


Hemoglobin


  


  


  9.2 g/dL


(12.0-15.5) 


 


 


Hematocrit


  


  


  28.8 %


(36.0-47.0) 


 


 


Mean Corpuscular Volume   80 fL ()  


 


Mean Corpuscular Hemoglobin   26 pg (25-35)  


 


Mean Corpuscular Hemoglobin


Concent 


  


  32 g/dL


(31-37) 


 


 


Red Cell Distribution Width


  


  


  15.9 %


(11.5-14.5) 


 


 


Platelet Count


  


  


  405 x10^3/uL


(140-400) 


 


 


Neutrophils (%) (Auto)   65 % (31-73)  


 


Lymphocytes (%) (Auto)   24 % (24-48)  


 


Monocytes (%) (Auto)   8 % (0-9)  


 


Eosinophils (%) (Auto)   3 % (0-3)  


 


Basophils (%) (Auto)   0 % (0-3)  


 


Neutrophils # (Auto)


  


  


  8.7 x10^3uL


(1.8-7.7) 


 


 


Lymphocytes # (Auto)


  


  


  3.2 x10^3/uL


(1.0-4.8) 


 


 


Monocytes # (Auto)


  


  


  1.0 x10^3/uL


(0.0-1.1) 


 


 


Eosinophils # (Auto)


  


  


  0.4 x10^3/uL


(0.0-0.7) 


 


 


Basophils # (Auto)


  


  


  0.0 x10^3/uL


(0.0-0.2) 


 


 


Sodium Level


  


  


  139 mmol/L


(136-145) 


 


 


Potassium Level


  


  


  3.6 mmol/L


(3.5-5.1) 


 


 


Chloride Level


  


  


  102 mmol/L


() 


 


 


Carbon Dioxide Level


  


  


  27 mmol/L


(21-32) 


 


 


Anion Gap   10 (6-14)  


 


Blood Urea Nitrogen   7 mg/dL (7-20)  


 


Creatinine


  


  


  0.8 mg/dL


(0.6-1.0) 


 


 


Estimated GFR


(Cockcroft-Gault) 


  


  80.7 


  


 


 


Glucose Level


  


  


  242 mg/dL


(70-99) 


 


 


Calcium Level


  


  


  8.4 mg/dL


(8.5-10.1) 


 


 


Test


  8/1/17


11:53 


  


  


 


 


Glucose (Fingerstick)


  289 mg/dL


(70-99) 


  


  


 








Laboratory Tests








Test


  7/31/17


16:57 7/31/17


20:26 8/1/17


03:00 8/1/17


08:07


 


Glucose (Fingerstick)


  316 mg/dL


(70-99) 326 mg/dL


(70-99) 


  220 mg/dL


(70-99)


 


White Blood Count


  


  


  13.3 x10^3/uL


(4.0-11.0) 


 


 


Red Blood Count


  


  


  3.60 x10^6/uL


(3.50-5.40) 


 


 


Hemoglobin


  


  


  9.2 g/dL


(12.0-15.5) 


 


 


Hematocrit


  


  


  28.8 %


(36.0-47.0) 


 


 


Mean Corpuscular Volume   80 fL ()  


 


Mean Corpuscular Hemoglobin   26 pg (25-35)  


 


Mean Corpuscular Hemoglobin


Concent 


  


  32 g/dL


(31-37) 


 


 


Red Cell Distribution Width


  


  


  15.9 %


(11.5-14.5) 


 


 


Platelet Count


  


  


  405 x10^3/uL


(140-400) 


 


 


Neutrophils (%) (Auto)   65 % (31-73)  


 


Lymphocytes (%) (Auto)   24 % (24-48)  


 


Monocytes (%) (Auto)   8 % (0-9)  


 


Eosinophils (%) (Auto)   3 % (0-3)  


 


Basophils (%) (Auto)   0 % (0-3)  


 


Neutrophils # (Auto)


  


  


  8.7 x10^3uL


(1.8-7.7) 


 


 


Lymphocytes # (Auto)


  


  


  3.2 x10^3/uL


(1.0-4.8) 


 


 


Monocytes # (Auto)


  


  


  1.0 x10^3/uL


(0.0-1.1) 


 


 


Eosinophils # (Auto)


  


  


  0.4 x10^3/uL


(0.0-0.7) 


 


 


Basophils # (Auto)


  


  


  0.0 x10^3/uL


(0.0-0.2) 


 


 


Sodium Level


  


  


  139 mmol/L


(136-145) 


 


 


Potassium Level


  


  


  3.6 mmol/L


(3.5-5.1) 


 


 


Chloride Level


  


  


  102 mmol/L


() 


 


 


Carbon Dioxide Level


  


  


  27 mmol/L


(21-32) 


 


 


Anion Gap   10 (6-14)  


 


Blood Urea Nitrogen   7 mg/dL (7-20)  


 


Creatinine


  


  


  0.8 mg/dL


(0.6-1.0) 


 


 


Estimated GFR


(Cockcroft-Gault) 


  


  80.7 


  


 


 


Glucose Level


  


  


  242 mg/dL


(70-99) 


 


 


Calcium Level


  


  


  8.4 mg/dL


(8.5-10.1) 


 


 


Test


  8/1/17


11:53 


  


  


 


 


Glucose (Fingerstick)


  289 mg/dL


(70-99) 


  


  


 








Microbiology


7/29/17 Anaerobic/Aerobic Culture, Resulted


          Pending


7/29/17 Anaerobic Culture Result 1 (NINI), Resulted


          Pending


7/29/17 Aerobic Culture - Final, Resulted


          


7/29/17 Aerobic Culture Result 1 (NINI) - Final, Resulted


Medications





Current Medications


Sodium Chloride 500 ml @  500 mls/hr 1X  ONCE IV  Last administered on 7/28/ 17at 19:57;  Start 7/28/17 at 20:15;  Stop 7/28/17 at 21:14;  Status DC


Vancomycin HCl (Vanco Per Pharmacy) 1 each PRN DAILY  PRN MC SEE COMMENTS Last 

administered on 7/29/17at 08:57;  Start 7/28/17 at 20:15;  Stop 7/29/17 at 12:34

;  Status DC


Ondansetron HCl (Zofran) 4 mg PRN Q8HRS  PRN IV NAUSEA/VOMITING;  Start 7/28/17 

at 20:15;  Stop 7/29/17 at 19:42;  Status DC


Morphine Sulfate 2 mg PRN Q2HR  PRN IV PAIN;  Start 7/28/17 at 20:15;  Stop 7/29 /17 at 20:14;  Status DC


Sodium Chloride 1,000 ml @  100 mls/hr Q10H IV  Last administered on 7/29/17at 

12:07;  Start 7/28/17 at 20:13;  Stop 7/29/17 at 20:12;  Status DC


Vancomycin HCl 2 gm/Sodium Chloride 500 ml @  250 mls/hr 1X  ONCE IV  Last 

administered on 7/28/17at 20:33;  Start 7/28/17 at 21:00;  Stop 7/28/17 at 22:59

;  Status DC


Insulin Human Regular (NovoLIN R VIAL) 10 unit 1X  ONCE IV ;  Start 7/28/17 at 

21:15;  Stop 7/28/17 at 22:58;  Status DC


Albuterol/ Ipratropium (Duoneb) 3 ml 1X  ONCE NEB  Last administered on 7/28/ 17at 21:57;  Start 7/28/17 at 21:30;  Stop 7/28/17 at 21:31;  Status DC


Vancomycin HCl 1 each 1X  ONCE MC ;  Start 7/29/17 at 20:30;  Stop 7/29/17 at 20

:30;  Status DC


Vancomycin HCl 1.75 gm/Sodium Chloride 500 ml @  250 mls/hr Q8H IV  Last 

administered on 7/29/17at 05:36;  Start 7/29/17 at 05:00;  Stop 7/29/17 at 12:33

;  Status DC


Insulin Aspart (NovoLOG) 0-9 UNITS TIDWMEALS SQ  Last administered on 7/30/17at 

17:10;  Start 7/29/17 at 08:00;  Stop 7/30/17 at 21:30;  Status DC


Dextrose (Dextrose 50%-Water Syringe) 12.5 gm PRN Q15MIN  PRN IV SEE COMMENTS;  

Start 7/28/17 at 23:00


Insulin Aspart (NovoLOG) 10 units 1X  ONCE SQ  Last administered on 7/28/17at 23

:34;  Start 7/28/17 at 23:15;  Stop 7/28/17 at 23:16;  Status DC


Piperacillin Sod/ Tazobactam Sod 3.375 gm/Sodium Chloride 50 ml @  100 mls/hr 

Q6HRS IV  Last administered on 8/1/17at 11:59;  Start 7/29/17 at 12:00


Fentanyl Citrate (Fentanyl 2ml Vial) 100 mcg STK-MED ONCE .ROUTE ;  Start 7/29/ 17 at 18:37;  Stop 7/29/17 at 18:38;  Status DC


Sevoflurane (Ultane) 15 ml STK-MED ONCE IH ;  Start 7/29/17 at 18:37;  Stop 7/29 /17 at 18:38;  Status DC


Propofol 20 ml @ As Directed STK-MED ONCE IV ;  Start 7/29/17 at 18:37;  Stop 7/ 29/17 at 18:38;  Status DC


Dexamethasone Sodium Phosphate (Decadron) 20 mg STK-MED ONCE .ROUTE ;  Start 7/ 29/17 at 18:37;  Stop 7/29/17 at 18:38;  Status DC


Ondansetron HCl (Zofran) 4 mg STK-MED ONCE .ROUTE ;  Start 7/29/17 at 18:37;  

Stop 7/29/17 at 18:38;  Status DC


Lidocaine HCl (Lidocaine Pf 2% Vial) 5 ml STK-MED ONCE .ROUTE ;  Start 7/29/17 

at 18:37;  Stop 7/29/17 at 18:38;  Status DC


Ondansetron HCl (Zofran) 4 mg PRN Q6HRS  PRN IV NAUSEA/VOMITING;  Start 7/29/17 

at 19:00;  Stop 7/29/17 at 23:59;  Status DC


Fentanyl Citrate (Fentanyl 2ml Vial) 25 mcg PRN Q5MIN  PRN IV MILD PAIN;  Start 

7/29/17 at 19:00;  Stop 7/29/17 at 23:59;  Status DC


Fentanyl Citrate (Fentanyl 2ml Vial) 50 mcg PRN Q5MIN  PRN IV MODERATE PAIN 

Last administered on 7/29/17at 20:04;  Start 7/29/17 at 19:00;  Stop 7/29/17 at 

23:59;  Status DC


Morphine Sulfate 1 mg PRN Q10MIN  PRN IV SEVERE PAIN Last administered on 7/29/ 17at 19:50;  Start 7/29/17 at 19:00;  Stop 7/29/17 at 23:59;  Status DC


Ringer's Solution 1,000 ml @  30 mls/hr Q24H IV  Last administered on 7/29/17at 

18:55;  Start 7/29/17 at 19:00;  Stop 7/29/17 at 23:59;  Status DC


Lidocaine HCl 2 ml PRN 1X  PRN ID PRIOR TO IV START;  Start 7/29/17 at 19:00;  

Stop 7/29/17 at 23:59;  Status DC


Hydromorphone HCl (Dilaudid) 0.5 mg PRN Q10MIN  PRN IV SEV PAIN, Second choice;

  Start 7/29/17 at 19:00;  Stop 7/29/17 at 23:59;  Status DC


Prochlorperazine Edisylate (Compazine) 5 mg PACU PRN  PRN IV NAUSEA, MRX1;  

Start 7/29/17 at 19:00;  Stop 7/29/17 at 23:59;  Status DC


Enoxaparin Sodium (Lovenox 60mg Syringe) 60 mg Q12HR SQ ;  Start 7/30/17 at 09:

00;  Stop 7/30/17 at 11:57;  Status DC


Sodium Chloride (Normal Saline Flush) 3 ml QSHIFT  PRN IV AFTER MEDS AND BLOOD 

DRAWS;  Start 7/29/17 at 19:30


Acetaminophen/ Hydrocodone Bitart (Lortab 5/325) 1 tab PRN Q4HRS  PRN PO MILD 

PAIN Last administered on 7/31/17at 17:22;  Start 7/29/17 at 19:30


Senna/Docusate Sodium (Senna Plus) 1 tab BID PO  Last administered on 7/31/17at 

08:23;  Start 7/29/17 at 21:00


Ondansetron HCl (Zofran) 4 mg PRN Q6HRS  PRN IV NAUESA, 1ST CHOICE;  Start 7/29/ 17 at 19:30


Insulin Aspart (NovoLOG VIAL) 4 unit 1X  ONCE SQ  Last administered on 7/29/ 17at 19:47;  Start 7/29/17 at 19:45;  Stop 7/29/17 at 19:46;  Status DC


Morphine Sulfate 2 mg STK-MED ONCE .ROUTE ;  Start 7/29/17 at 19:39;  Stop 7/29/ 17 at 19:40;  Status DC


Insulin Aspart (NovoLOG VIAL) 100 unit STK-MED ONCE SQ ;  Start 7/29/17 at 19:39

;  Stop 7/29/17 at 19:40;  Status DC


Insulin Aspart (NovoLOG) 0-9 UNITS QIDACHS SQ ;  Start 7/31/17 at 07:30;  Stop 7 /31/17 at 07:30;  Status DC


Insulin Aspart (NovoLOG) 0-9 UNITS QIDACHS SQ  Last administered on 8/1/17at 12:

08;  Start 7/30/17 at 22:00


Iohexol (Omnipaque 240 Mg/ml) 30 ml 1X  ONCE PO  Last administered on 7/31/17at 

11:52;  Start 7/31/17 at 10:00;  Stop 7/31/17 at 10:01;  Status DC


Iohexol (Omnipaque 300 Mg/ml) 75 ml 1X  ONCE IV  Last administered on 7/31/17at 

11:51;  Start 7/31/17 at 10:00;  Stop 7/31/17 at 10:01;  Status DC


Insulin Detemir (Levemir) 20 units QHS SQ  Last administered on 7/31/17at 20:37

;  Start 7/31/17 at 21:00;  Stop 8/1/17 at 08:36;  Status DC


Insulin Detemir (Levemir) 10 units 1X  STAT SQ  Last administered on 7/31/17at 

12:23;  Start 7/31/17 at 10:06;  Stop 7/31/17 at 10:09;  Status DC


Medroxyprogesterone Acetate (Provera) 10 mg BID PO  Last administered on 8/1/ 17at 08:16;  Start 7/31/17 at 12:00


Insulin Aspart (NovoLOG) 5 units TIDAC SQ  Last administered on 8/1/17at 08:24;

  Start 7/31/17 at 16:30;  Stop 8/1/17 at 08:36;  Status DC


Acetaminophen (Tylenol) 650 mg PRN Q6HRS  PRN PO FEVER;  Start 7/31/17 at 13:30


Ondansetron HCl (Zofran) 4 mg PRN Q6HRS  PRN IV NAUSEA/VOMITING;  Start 7/31/17 

at 13:30


Morphine Sulfate 2 mg PRN Q2HR  PRN IV PAIN;  Start 7/31/17 at 13:30


Tramadol HCl (Ultram) 50 mg PRN Q6HRS  PRN PO PAIN;  Start 7/31/17 at 13:30


Hydralazine HCl (Apresoline) 10 mg PRN Q4HRS  PRN IVP ELEVATED BP, SEE COMMENTS

;  Start 7/31/17 at 13:30


Docusate Sodium (Colace) 100 mg PRN DAILY  PRN PO CONSTIPATION;  Start 7/31/17 

at 13:30


Enoxaparin Sodium (Lovenox 40mg Syringe) 40 mg DAILY SQ ;  Start 8/1/17 at 09:00

;  Status Cancel


Enoxaparin Sodium (Lovenox 60mg Syringe) 60 mg Q12HR SQ  Last administered on 8/ 1/17at 08:17;  Start 7/31/17 at 21:00


Insulin Aspart (NovoLOG) 8 units TIDAC SQ  Last administered on 8/1/17at 12:09;

  Start 8/1/17 at 11:30


Insulin Detemir (Levemir) 25 units QHS SQ ;  Start 8/1/17 at 21:00





Active Scripts


Active


Reported


Norco 5-325 Tablet (Acetaminophen/Hydrocodone Bitart) 1 Each Tablet 1-2 Tab PO 

PRN Q4-6HRS PRN


     LAST DOSE GIVEN:


     DATE:


     TIME:


Bactrim Ds Tablet (Sulfamethoxazole/Trimethoprim) 1 Each Tablet 1 Tab PO BID


Aleve (Naproxen Sodium) 220 Mg Capsule 440 Mg PO BID


Vitals/I & O





Vital Sign - Last 24 Hours








 7/31/17 7/31/17 7/31/17 7/31/17





 15:11 17:22 18:30 19:00


 


Temp 97.9   98.4





 97.9   98.4


 


Pulse 90   98


 


Resp 20   19


 


B/P (MAP) 165/94 (117)   135/83 (100)


 


Pulse Ox 94   95


 


O2 Delivery Room Air Room Air Room Air Room Air


 


    





    





 7/31/17 7/31/17 8/1/17 8/1/17





 20:00 22:50 07:00 08:00


 


Temp  97.7 98.3 





  97.7 98.3 


 


Pulse  95 98 


 


Resp  18 17 


 


B/P (MAP)  143/91 (108) 146/92 (110) 


 


Pulse Ox  96 96 


 


O2 Delivery Room Air Room Air Room Air Room Air


 


    





    





 8/1/17   





 11:00   


 


Temp 98.1   





 98.1   


 


Pulse 80   


 


Resp 17   


 


B/P (MAP) 136/88 (104)   


 


Pulse Ox 97   


 


O2 Delivery Room Air   














Intake and Output   


 


 7/31/17 7/31/17 8/1/17





 15:00 23:00 07:00


 


Intake Total 600 ml 1200 ml 750 ml


 


Balance 600 ml 1200 ml 750 ml

















OLLIE DEWEY MD Aug 1, 2017 12:34

## 2017-08-02 VITALS — DIASTOLIC BLOOD PRESSURE: 84 MMHG | SYSTOLIC BLOOD PRESSURE: 161 MMHG

## 2017-08-02 VITALS — DIASTOLIC BLOOD PRESSURE: 83 MMHG | SYSTOLIC BLOOD PRESSURE: 152 MMHG

## 2017-08-02 VITALS — DIASTOLIC BLOOD PRESSURE: 88 MMHG | SYSTOLIC BLOOD PRESSURE: 166 MMHG

## 2017-08-02 VITALS — DIASTOLIC BLOOD PRESSURE: 75 MMHG | SYSTOLIC BLOOD PRESSURE: 158 MMHG

## 2017-08-02 VITALS — SYSTOLIC BLOOD PRESSURE: 144 MMHG | DIASTOLIC BLOOD PRESSURE: 83 MMHG

## 2017-08-02 VITALS — SYSTOLIC BLOOD PRESSURE: 148 MMHG | DIASTOLIC BLOOD PRESSURE: 82 MMHG

## 2017-08-02 LAB
ANION GAP SERPL CALC-SCNC: 9 MMOL/L (ref 6–14)
BASOPHILS # BLD AUTO: 0.1 X10^3/UL (ref 0–0.2)
BASOPHILS NFR BLD: 0 % (ref 0–3)
BUN SERPL-MCNC: 10 MG/DL (ref 7–20)
CALCIUM SERPL-MCNC: 8.1 MG/DL (ref 8.5–10.1)
CHLORIDE SERPL-SCNC: 105 MMOL/L (ref 98–107)
CO2 SERPL-SCNC: 26 MMOL/L (ref 21–32)
CREAT SERPL-MCNC: 0.9 MG/DL (ref 0.6–1)
EOSINOPHIL NFR BLD: 3 % (ref 0–3)
ERYTHROCYTE [DISTWIDTH] IN BLOOD BY AUTOMATED COUNT: 15.9 % (ref 11.5–14.5)
GFR SERPLBLD BASED ON 1.73 SQ M-ARVRAT: 70.5 ML/MIN
GLUCOSE SERPL-MCNC: 216 MG/DL (ref 70–99)
HCT VFR BLD CALC: 26.7 % (ref 36–47)
HGB BLD-MCNC: 8.9 G/DL (ref 12–15.5)
LYMPHOCYTES # BLD: 3.3 X10^3/UL (ref 1–4.8)
LYMPHOCYTES NFR BLD AUTO: 26 % (ref 24–48)
MCH RBC QN AUTO: 26 PG (ref 25–35)
MCHC RBC AUTO-ENTMCNC: 33 G/DL (ref 31–37)
MCV RBC AUTO: 79 FL (ref 79–100)
MONOCYTES NFR BLD: 7 % (ref 0–9)
NEUTROPHILS NFR BLD AUTO: 64 % (ref 31–73)
PLATELET # BLD AUTO: 402 X10^3/UL (ref 140–400)
POTASSIUM SERPL-SCNC: 3.4 MMOL/L (ref 3.5–5.1)
RBC # BLD AUTO: 3.4 X10^6/UL (ref 3.5–5.4)
SODIUM SERPL-SCNC: 140 MMOL/L (ref 136–145)
WBC # BLD AUTO: 12.5 X10^3/UL (ref 4–11)

## 2017-08-02 RX ADMIN — SENNOSIDES AND DOCUSATE SODIUM SCH TAB: 8.6; 5 TABLET ORAL at 09:15

## 2017-08-02 RX ADMIN — ENOXAPARIN SODIUM SCH MG: 100 INJECTION SUBCUTANEOUS at 22:19

## 2017-08-02 RX ADMIN — PIPERACILLIN SODIUM AND TAZOBACTAM SODIUM SCH MLS/HR: 3; .375 INJECTION, POWDER, LYOPHILIZED, FOR SOLUTION INTRAVENOUS at 02:08

## 2017-08-02 RX ADMIN — INSULIN ASPART SCH UNITS: 100 INJECTION, SOLUTION INTRAVENOUS; SUBCUTANEOUS at 11:51

## 2017-08-02 RX ADMIN — INSULIN ASPART SCH UNITS: 100 INJECTION, SOLUTION INTRAVENOUS; SUBCUTANEOUS at 17:09

## 2017-08-02 RX ADMIN — LISINOPRIL SCH MG: 20 TABLET ORAL at 10:32

## 2017-08-02 RX ADMIN — SENNOSIDES AND DOCUSATE SODIUM SCH TAB: 8.6; 5 TABLET ORAL at 21:00

## 2017-08-02 RX ADMIN — HYDROCODONE BITARTRATE AND ACETAMINOPHEN PRN TAB: 5; 325 TABLET ORAL at 10:33

## 2017-08-02 RX ADMIN — INSULIN ASPART SCH UNITS: 100 INJECTION, SOLUTION INTRAVENOUS; SUBCUTANEOUS at 09:15

## 2017-08-02 RX ADMIN — INSULIN ASPART SCH UNITS: 100 INJECTION, SOLUTION INTRAVENOUS; SUBCUTANEOUS at 10:01

## 2017-08-02 RX ADMIN — INSULIN ASPART SCH UNITS: 100 INJECTION, SOLUTION INTRAVENOUS; SUBCUTANEOUS at 22:25

## 2017-08-02 RX ADMIN — ENOXAPARIN SODIUM SCH MG: 100 INJECTION SUBCUTANEOUS at 09:15

## 2017-08-02 RX ADMIN — AMOXICILLIN AND CLAVULANATE POTASSIUM SCH TAB: 875; 125 TABLET, FILM COATED ORAL at 20:59

## 2017-08-02 RX ADMIN — HYDROCODONE BITARTRATE AND ACETAMINOPHEN PRN TAB: 5; 325 TABLET ORAL at 02:16

## 2017-08-02 RX ADMIN — HYDROCODONE BITARTRATE AND ACETAMINOPHEN PRN TAB: 5; 325 TABLET ORAL at 06:13

## 2017-08-02 RX ADMIN — INSULIN ASPART SCH UNITS: 100 INJECTION, SOLUTION INTRAVENOUS; SUBCUTANEOUS at 17:08

## 2017-08-02 RX ADMIN — AMOXICILLIN AND CLAVULANATE POTASSIUM SCH TAB: 875; 125 TABLET, FILM COATED ORAL at 10:36

## 2017-08-02 RX ADMIN — PIPERACILLIN SODIUM AND TAZOBACTAM SODIUM SCH MLS/HR: 3; .375 INJECTION, POWDER, LYOPHILIZED, FOR SOLUTION INTRAVENOUS at 06:04

## 2017-08-02 NOTE — PDOC
PROGRESS NOTES


Chief Complaint


Chief Complaint


 Worsening abscess right groin/thigh area. s/p excisional debridement of 

necrotic tissue down to adipose tissue of right groin, and I and D, 7/29. Intra-

op cultures pending 


 -s/p previous I and D on 7/25. group B Strep, anaerobic cx still pending.


sepsis


 -h/o pre-op steroids on 7/25 


Diabetes uncontrolled


Super morbid obesity, BMI 62


hypokalemia








plan:


fu with sx ,id


dc vanco, and zosyn, on augmentin from 8/2. fu wound cx


increase levemir 30u qhs, aspart 10u tid, ssi


pain control


wound care


dvt ppx


hba1c  12.9


replete K





History of Present Illness


History of Present Illness


severe pain when move, slightly better


low grade fever overnight


still wound drain





hyperglycemia





wbc still high at 12





Vitals


Vitals





Vital Signs








  Date Time  Temp Pulse Resp B/P (MAP) Pulse Ox O2 Delivery O2 Flow Rate FiO2


 


8/2/17 11:00 98.1 87 20 152/83 (106) 98 Room Air  





 98.1       











Physical Exam


General:  Alert, Oriented X3, Cooperative, No acute distress


Heart:  Regular rate


Lungs:  Clear, Other (No RRW)


Abdomen:  Normal bowel sounds, Soft, No tenderness, No masses, Other (pelvic: 

deferred.)


Extremities:  No clubbing, No cyanosis


Skin:  Other





Labs


LABS





Laboratory Tests








Test


  8/1/17


16:27 8/1/17


21:06 8/2/17


03:23 8/2/17


07:42


 


Glucose (Fingerstick)


  246 mg/dL


(70-99) 251 mg/dL


(70-99) 


  225 mg/dL


(70-99)


 


White Blood Count


  


  


  12.5 x10^3/uL


(4.0-11.0) 


 


 


Red Blood Count


  


  


  3.40 x10^6/uL


(3.50-5.40) 


 


 


Hemoglobin


  


  


  8.9 g/dL


(12.0-15.5) 


 


 


Hematocrit


  


  


  26.7 %


(36.0-47.0) 


 


 


Mean Corpuscular Volume   79 fL ()  


 


Mean Corpuscular Hemoglobin   26 pg (25-35)  


 


Mean Corpuscular Hemoglobin


Concent 


  


  33 g/dL


(31-37) 


 


 


Red Cell Distribution Width


  


  


  15.9 %


(11.5-14.5) 


 


 


Platelet Count


  


  


  402 x10^3/uL


(140-400) 


 


 


Neutrophils (%) (Auto)   64 % (31-73)  


 


Lymphocytes (%) (Auto)   26 % (24-48)  


 


Monocytes (%) (Auto)   7 % (0-9)  


 


Eosinophils (%) (Auto)   3 % (0-3)  


 


Basophils (%) (Auto)   0 % (0-3)  


 


Neutrophils # (Auto)


  


  


  7.9 x10^3uL


(1.8-7.7) 


 


 


Lymphocytes # (Auto)


  


  


  3.3 x10^3/uL


(1.0-4.8) 


 


 


Monocytes # (Auto)


  


  


  0.9 x10^3/uL


(0.0-1.1) 


 


 


Eosinophils # (Auto)


  


  


  0.3 x10^3/uL


(0.0-0.7) 


 


 


Basophils # (Auto)


  


  


  0.1 x10^3/uL


(0.0-0.2) 


 


 


Sodium Level


  


  


  140 mmol/L


(136-145) 


 


 


Potassium Level


  


  


  3.4 mmol/L


(3.5-5.1) 


 


 


Chloride Level


  


  


  105 mmol/L


() 


 


 


Carbon Dioxide Level


  


  


  26 mmol/L


(21-32) 


 


 


Anion Gap   9 (6-14)  


 


Blood Urea Nitrogen


  


  


  10 mg/dL


(7-20) 


 


 


Creatinine


  


  


  0.9 mg/dL


(0.6-1.0) 


 


 


Estimated GFR


(Cockcroft-Gault) 


  


  70.5 


  


 


 


Glucose Level


  


  


  216 mg/dL


(70-99) 


 


 


Calcium Level


  


  


  8.1 mg/dL


(8.5-10.1) 


 


 


Test


  8/2/17


09:58 


  


  


 


 


Glucose (Fingerstick)


  243 mg/dL


(70-99) 


  


  


 











Review of Systems


Review of Systems


no   chills, sob or chest pain





Comment


Review of Relevant


I have reviewed the following items lana (where applicable) has been applied.


Labs





Laboratory Tests








Test


  7/31/17


16:57 7/31/17


20:26 8/1/17


03:00 8/1/17


08:07


 


Glucose (Fingerstick)


  316 mg/dL


(70-99) 326 mg/dL


(70-99) 


  220 mg/dL


(70-99)


 


White Blood Count


  


  


  13.3 x10^3/uL


(4.0-11.0) 


 


 


Red Blood Count


  


  


  3.60 x10^6/uL


(3.50-5.40) 


 


 


Hemoglobin


  


  


  9.2 g/dL


(12.0-15.5) 


 


 


Hematocrit


  


  


  28.8 %


(36.0-47.0) 


 


 


Mean Corpuscular Volume   80 fL ()  


 


Mean Corpuscular Hemoglobin   26 pg (25-35)  


 


Mean Corpuscular Hemoglobin


Concent 


  


  32 g/dL


(31-37) 


 


 


Red Cell Distribution Width


  


  


  15.9 %


(11.5-14.5) 


 


 


Platelet Count


  


  


  405 x10^3/uL


(140-400) 


 


 


Neutrophils (%) (Auto)   65 % (31-73)  


 


Lymphocytes (%) (Auto)   24 % (24-48)  


 


Monocytes (%) (Auto)   8 % (0-9)  


 


Eosinophils (%) (Auto)   3 % (0-3)  


 


Basophils (%) (Auto)   0 % (0-3)  


 


Neutrophils # (Auto)


  


  


  8.7 x10^3uL


(1.8-7.7) 


 


 


Lymphocytes # (Auto)


  


  


  3.2 x10^3/uL


(1.0-4.8) 


 


 


Monocytes # (Auto)


  


  


  1.0 x10^3/uL


(0.0-1.1) 


 


 


Eosinophils # (Auto)


  


  


  0.4 x10^3/uL


(0.0-0.7) 


 


 


Basophils # (Auto)


  


  


  0.0 x10^3/uL


(0.0-0.2) 


 


 


Sodium Level


  


  


  139 mmol/L


(136-145) 


 


 


Potassium Level


  


  


  3.6 mmol/L


(3.5-5.1) 


 


 


Chloride Level


  


  


  102 mmol/L


() 


 


 


Carbon Dioxide Level


  


  


  27 mmol/L


(21-32) 


 


 


Anion Gap   10 (6-14)  


 


Blood Urea Nitrogen   7 mg/dL (7-20)  


 


Creatinine


  


  


  0.8 mg/dL


(0.6-1.0) 


 


 


Estimated GFR


(Cockcroft-Gault) 


  


  80.7 


  


 


 


Glucose Level


  


  


  242 mg/dL


(70-99) 


 


 


Hemoglobin A1c


  


  


  12.9 %


(4.8-5.6) 


 


 


Calcium Level


  


  


  8.4 mg/dL


(8.5-10.1) 


 


 


Test


  8/1/17


11:53 8/1/17


16:27 8/1/17


21:06 8/2/17


03:23


 


Glucose (Fingerstick)


  289 mg/dL


(70-99) 246 mg/dL


(70-99) 251 mg/dL


(70-99) 


 


 


White Blood Count


  


  


  


  12.5 x10^3/uL


(4.0-11.0)


 


Red Blood Count


  


  


  


  3.40 x10^6/uL


(3.50-5.40)


 


Hemoglobin


  


  


  


  8.9 g/dL


(12.0-15.5)


 


Hematocrit


  


  


  


  26.7 %


(36.0-47.0)


 


Mean Corpuscular Volume    79 fL () 


 


Mean Corpuscular Hemoglobin    26 pg (25-35) 


 


Mean Corpuscular Hemoglobin


Concent 


  


  


  33 g/dL


(31-37)


 


Red Cell Distribution Width


  


  


  


  15.9 %


(11.5-14.5)


 


Platelet Count


  


  


  


  402 x10^3/uL


(140-400)


 


Neutrophils (%) (Auto)    64 % (31-73) 


 


Lymphocytes (%) (Auto)    26 % (24-48) 


 


Monocytes (%) (Auto)    7 % (0-9) 


 


Eosinophils (%) (Auto)    3 % (0-3) 


 


Basophils (%) (Auto)    0 % (0-3) 


 


Neutrophils # (Auto)


  


  


  


  7.9 x10^3uL


(1.8-7.7)


 


Lymphocytes # (Auto)


  


  


  


  3.3 x10^3/uL


(1.0-4.8)


 


Monocytes # (Auto)


  


  


  


  0.9 x10^3/uL


(0.0-1.1)


 


Eosinophils # (Auto)


  


  


  


  0.3 x10^3/uL


(0.0-0.7)


 


Basophils # (Auto)


  


  


  


  0.1 x10^3/uL


(0.0-0.2)


 


Sodium Level


  


  


  


  140 mmol/L


(136-145)


 


Potassium Level


  


  


  


  3.4 mmol/L


(3.5-5.1)


 


Chloride Level


  


  


  


  105 mmol/L


()


 


Carbon Dioxide Level


  


  


  


  26 mmol/L


(21-32)


 


Anion Gap    9 (6-14) 


 


Blood Urea Nitrogen


  


  


  


  10 mg/dL


(7-20)


 


Creatinine


  


  


  


  0.9 mg/dL


(0.6-1.0)


 


Estimated GFR


(Cockcroft-Gault) 


  


  


  70.5 


 


 


Glucose Level


  


  


  


  216 mg/dL


(70-99)


 


Calcium Level


  


  


  


  8.1 mg/dL


(8.5-10.1)


 


Test


  8/2/17


07:42 8/2/17


09:58 


  


 


 


Glucose (Fingerstick)


  225 mg/dL


(70-99) 243 mg/dL


(70-99) 


  


 








Laboratory Tests








Test


  8/1/17


16:27 8/1/17


21:06 8/2/17


03:23 8/2/17


07:42


 


Glucose (Fingerstick)


  246 mg/dL


(70-99) 251 mg/dL


(70-99) 


  225 mg/dL


(70-99)


 


White Blood Count


  


  


  12.5 x10^3/uL


(4.0-11.0) 


 


 


Red Blood Count


  


  


  3.40 x10^6/uL


(3.50-5.40) 


 


 


Hemoglobin


  


  


  8.9 g/dL


(12.0-15.5) 


 


 


Hematocrit


  


  


  26.7 %


(36.0-47.0) 


 


 


Mean Corpuscular Volume   79 fL ()  


 


Mean Corpuscular Hemoglobin   26 pg (25-35)  


 


Mean Corpuscular Hemoglobin


Concent 


  


  33 g/dL


(31-37) 


 


 


Red Cell Distribution Width


  


  


  15.9 %


(11.5-14.5) 


 


 


Platelet Count


  


  


  402 x10^3/uL


(140-400) 


 


 


Neutrophils (%) (Auto)   64 % (31-73)  


 


Lymphocytes (%) (Auto)   26 % (24-48)  


 


Monocytes (%) (Auto)   7 % (0-9)  


 


Eosinophils (%) (Auto)   3 % (0-3)  


 


Basophils (%) (Auto)   0 % (0-3)  


 


Neutrophils # (Auto)


  


  


  7.9 x10^3uL


(1.8-7.7) 


 


 


Lymphocytes # (Auto)


  


  


  3.3 x10^3/uL


(1.0-4.8) 


 


 


Monocytes # (Auto)


  


  


  0.9 x10^3/uL


(0.0-1.1) 


 


 


Eosinophils # (Auto)


  


  


  0.3 x10^3/uL


(0.0-0.7) 


 


 


Basophils # (Auto)


  


  


  0.1 x10^3/uL


(0.0-0.2) 


 


 


Sodium Level


  


  


  140 mmol/L


(136-145) 


 


 


Potassium Level


  


  


  3.4 mmol/L


(3.5-5.1) 


 


 


Chloride Level


  


  


  105 mmol/L


() 


 


 


Carbon Dioxide Level


  


  


  26 mmol/L


(21-32) 


 


 


Anion Gap   9 (6-14)  


 


Blood Urea Nitrogen


  


  


  10 mg/dL


(7-20) 


 


 


Creatinine


  


  


  0.9 mg/dL


(0.6-1.0) 


 


 


Estimated GFR


(Cockcroft-Gault) 


  


  70.5 


  


 


 


Glucose Level


  


  


  216 mg/dL


(70-99) 


 


 


Calcium Level


  


  


  8.1 mg/dL


(8.5-10.1) 


 


 


Test


  8/2/17


09:58 


  


  


 


 


Glucose (Fingerstick)


  243 mg/dL


(70-99) 


  


  


 








Microbiology


7/29/17 Anaerobic/Aerobic Culture, Resulted


          Pending


7/29/17 Anaerobic Culture Result 1 (NINI), Resulted


          Pending


7/29/17 Aerobic Culture - Final, Resulted


          


7/29/17 Aerobic Culture Result 1 (NINI) - Final, Resulted


Medications





Current Medications


Sodium Chloride 500 ml @  500 mls/hr 1X  ONCE IV  Last administered on 7/28/ 17at 19:57;  Start 7/28/17 at 20:15;  Stop 7/28/17 at 21:14;  Status DC


Vancomycin HCl (Vanco Per Pharmacy) 1 each PRN DAILY  PRN MC SEE COMMENTS Last 

administered on 7/29/17at 08:57;  Start 7/28/17 at 20:15;  Stop 7/29/17 at 12:34

;  Status DC


Ondansetron HCl (Zofran) 4 mg PRN Q8HRS  PRN IV NAUSEA/VOMITING;  Start 7/28/17 

at 20:15;  Stop 7/29/17 at 19:42;  Status DC


Morphine Sulfate 2 mg PRN Q2HR  PRN IV PAIN;  Start 7/28/17 at 20:15;  Stop 7/29 /17 at 20:14;  Status DC


Sodium Chloride 1,000 ml @  100 mls/hr Q10H IV  Last administered on 7/29/17at 

12:07;  Start 7/28/17 at 20:13;  Stop 7/29/17 at 20:12;  Status DC


Vancomycin HCl 2 gm/Sodium Chloride 500 ml @  250 mls/hr 1X  ONCE IV  Last 

administered on 7/28/17at 20:33;  Start 7/28/17 at 21:00;  Stop 7/28/17 at 22:59

;  Status DC


Insulin Human Regular (NovoLIN R VIAL) 10 unit 1X  ONCE IV ;  Start 7/28/17 at 

21:15;  Stop 7/28/17 at 22:58;  Status DC


Albuterol/ Ipratropium (Duoneb) 3 ml 1X  ONCE NEB  Last administered on 7/28/ 17at 21:57;  Start 7/28/17 at 21:30;  Stop 7/28/17 at 21:31;  Status DC


Vancomycin HCl 1 each 1X  ONCE MC ;  Start 7/29/17 at 20:30;  Stop 7/29/17 at 20

:30;  Status DC


Vancomycin HCl 1.75 gm/Sodium Chloride 500 ml @  250 mls/hr Q8H IV  Last 

administered on 7/29/17at 05:36;  Start 7/29/17 at 05:00;  Stop 7/29/17 at 12:33

;  Status DC


Insulin Aspart (NovoLOG) 0-9 UNITS TIDWMEALS SQ  Last administered on 7/30/17at 

17:10;  Start 7/29/17 at 08:00;  Stop 7/30/17 at 21:30;  Status DC


Dextrose (Dextrose 50%-Water Syringe) 12.5 gm PRN Q15MIN  PRN IV SEE COMMENTS;  

Start 7/28/17 at 23:00


Insulin Aspart (NovoLOG) 10 units 1X  ONCE SQ  Last administered on 7/28/17at 23

:34;  Start 7/28/17 at 23:15;  Stop 7/28/17 at 23:16;  Status DC


Piperacillin Sod/ Tazobactam Sod 3.375 gm/Sodium Chloride 50 ml @  100 mls/hr 

Q6HRS IV  Last administered on 8/2/17at 06:04;  Start 7/29/17 at 12:00;  Stop 8/ 2/17 at 10:11;  Status DC


Fentanyl Citrate (Fentanyl 2ml Vial) 100 mcg STK-MED ONCE .ROUTE ;  Start 7/29/ 17 at 18:37;  Stop 7/29/17 at 18:38;  Status DC


Sevoflurane (Ultane) 15 ml STK-MED ONCE IH ;  Start 7/29/17 at 18:37;  Stop 7/29 /17 at 18:38;  Status DC


Propofol 20 ml @ As Directed STK-MED ONCE IV ;  Start 7/29/17 at 18:37;  Stop 7/ 29/17 at 18:38;  Status DC


Dexamethasone Sodium Phosphate (Decadron) 20 mg STK-MED ONCE .ROUTE ;  Start 7/ 29/17 at 18:37;  Stop 7/29/17 at 18:38;  Status DC


Ondansetron HCl (Zofran) 4 mg STK-MED ONCE .ROUTE ;  Start 7/29/17 at 18:37;  

Stop 7/29/17 at 18:38;  Status DC


Lidocaine HCl (Lidocaine Pf 2% Vial) 5 ml STK-MED ONCE .ROUTE ;  Start 7/29/17 

at 18:37;  Stop 7/29/17 at 18:38;  Status DC


Ondansetron HCl (Zofran) 4 mg PRN Q6HRS  PRN IV NAUSEA/VOMITING;  Start 7/29/17 

at 19:00;  Stop 7/29/17 at 23:59;  Status DC


Fentanyl Citrate (Fentanyl 2ml Vial) 25 mcg PRN Q5MIN  PRN IV MILD PAIN;  Start 

7/29/17 at 19:00;  Stop 7/29/17 at 23:59;  Status DC


Fentanyl Citrate (Fentanyl 2ml Vial) 50 mcg PRN Q5MIN  PRN IV MODERATE PAIN 

Last administered on 7/29/17at 20:04;  Start 7/29/17 at 19:00;  Stop 7/29/17 at 

23:59;  Status DC


Morphine Sulfate 1 mg PRN Q10MIN  PRN IV SEVERE PAIN Last administered on 7/29/ 17at 19:50;  Start 7/29/17 at 19:00;  Stop 7/29/17 at 23:59;  Status DC


Ringer's Solution 1,000 ml @  30 mls/hr Q24H IV  Last administered on 7/29/17at 

18:55;  Start 7/29/17 at 19:00;  Stop 7/29/17 at 23:59;  Status DC


Lidocaine HCl 2 ml PRN 1X  PRN ID PRIOR TO IV START;  Start 7/29/17 at 19:00;  

Stop 7/29/17 at 23:59;  Status DC


Hydromorphone HCl (Dilaudid) 0.5 mg PRN Q10MIN  PRN IV SEV PAIN, Second choice;

  Start 7/29/17 at 19:00;  Stop 7/29/17 at 23:59;  Status DC


Prochlorperazine Edisylate (Compazine) 5 mg PACU PRN  PRN IV NAUSEA, MRX1;  

Start 7/29/17 at 19:00;  Stop 7/29/17 at 23:59;  Status DC


Enoxaparin Sodium (Lovenox 60mg Syringe) 60 mg Q12HR SQ ;  Start 7/30/17 at 09:

00;  Stop 7/30/17 at 11:57;  Status DC


Sodium Chloride (Normal Saline Flush) 3 ml QSHIFT  PRN IV AFTER MEDS AND BLOOD 

DRAWS;  Start 7/29/17 at 19:30


Acetaminophen/ Hydrocodone Bitart (Lortab 5/325) 1 tab PRN Q4HRS  PRN PO MILD 

PAIN Last administered on 8/2/17at 10:33;  Start 7/29/17 at 19:30


Senna/Docusate Sodium (Senna Plus) 1 tab BID PO  Last administered on 7/31/17at 

08:23;  Start 7/29/17 at 21:00


Ondansetron HCl (Zofran) 4 mg PRN Q6HRS  PRN IV NAUESA, 1ST CHOICE Last 

administered on 8/2/17at 10:32;  Start 7/29/17 at 19:30


Insulin Aspart (NovoLOG VIAL) 4 unit 1X  ONCE SQ  Last administered on 7/29/ 17at 19:47;  Start 7/29/17 at 19:45;  Stop 7/29/17 at 19:46;  Status DC


Morphine Sulfate 2 mg STK-MED ONCE .ROUTE ;  Start 7/29/17 at 19:39;  Stop 7/29/ 17 at 19:40;  Status DC


Insulin Aspart (NovoLOG VIAL) 100 unit STK-MED ONCE SQ ;  Start 7/29/17 at 19:39

;  Stop 7/29/17 at 19:40;  Status DC


Insulin Aspart (NovoLOG) 0-9 UNITS QIDACHS SQ ;  Start 7/31/17 at 07:30;  Stop 7 /31/17 at 07:30;  Status DC


Insulin Aspart (NovoLOG) 0-9 UNITS QIDACHS SQ  Last administered on 8/2/17at 11:

51;  Start 7/30/17 at 22:00


Iohexol (Omnipaque 240 Mg/ml) 30 ml 1X  ONCE PO  Last administered on 7/31/17at 

11:52;  Start 7/31/17 at 10:00;  Stop 7/31/17 at 10:01;  Status DC


Iohexol (Omnipaque 300 Mg/ml) 75 ml 1X  ONCE IV  Last administered on 7/31/17at 

11:51;  Start 7/31/17 at 10:00;  Stop 7/31/17 at 10:01;  Status DC


Insulin Detemir (Levemir) 20 units QHS SQ  Last administered on 7/31/17at 20:37

;  Start 7/31/17 at 21:00;  Stop 8/1/17 at 08:36;  Status DC


Insulin Detemir (Levemir) 10 units 1X  STAT SQ  Last administered on 7/31/17at 

12:23;  Start 7/31/17 at 10:06;  Stop 7/31/17 at 10:09;  Status DC


Medroxyprogesterone Acetate (Provera) 10 mg BID PO  Last administered on 8/2/ 17at 09:15;  Start 7/31/17 at 12:00


Insulin Aspart (NovoLOG) 5 units TIDAC SQ  Last administered on 8/1/17at 08:24;

  Start 7/31/17 at 16:30;  Stop 8/1/17 at 08:36;  Status DC


Acetaminophen (Tylenol) 650 mg PRN Q6HRS  PRN PO FEVER Last administered on 8/1/ 17at 20:34;  Start 7/31/17 at 13:30


Ondansetron HCl (Zofran) 4 mg PRN Q6HRS  PRN IV NAUSEA/VOMITING;  Start 7/31/17 

at 13:30


Morphine Sulfate 2 mg PRN Q2HR  PRN IV PAIN;  Start 7/31/17 at 13:30


Tramadol HCl (Ultram) 50 mg PRN Q6HRS  PRN PO PAIN;  Start 7/31/17 at 13:30


Hydralazine HCl (Apresoline) 10 mg PRN Q4HRS  PRN IVP ELEVATED BP, SEE COMMENTS

;  Start 7/31/17 at 13:30


Docusate Sodium (Colace) 100 mg PRN DAILY  PRN PO CONSTIPATION;  Start 7/31/17 

at 13:30


Enoxaparin Sodium (Lovenox 40mg Syringe) 40 mg DAILY SQ ;  Start 8/1/17 at 09:00

;  Status Cancel


Enoxaparin Sodium (Lovenox 60mg Syringe) 60 mg Q12HR SQ  Last administered on 8/ 2/17at 09:15;  Start 7/31/17 at 21:00


Insulin Aspart (NovoLOG) 8 units TIDAC SQ  Last administered on 8/1/17at 17:58;

  Start 8/1/17 at 11:30;  Stop 8/2/17 at 08:00;  Status DC


Insulin Detemir (Levemir) 25 units QHS SQ  Last administered on 8/1/17at 22:46;

  Start 8/1/17 at 21:00;  Stop 8/2/17 at 08:00;  Status DC


Insulin Aspart (NovoLOG) 10 units TIDAC SQ  Last administered on 8/2/17at 09:15

;  Start 8/2/17 at 11:30


Insulin Detemir (Levemir) 30 units QHS SQ ;  Start 8/2/17 at 21:00


Potassium Chloride (Klor-Con) 40 meq 1X  ONCE PO  Last administered on 8/2/17at 

09:15;  Start 8/2/17 at 08:00;  Stop 8/2/17 at 08:01;  Status DC


Lisinopril (Prinivil) 20 mg DAILY PO  Last administered on 8/2/17at 10:32;  

Start 8/2/17 at 10:00


Amoxicillin/ Clavulanate Potassium (Augmentin 875/ 125mg) 1 tab BID PO  Last 

administered on 8/2/17at 10:36;  Start 8/2/17 at 11:00





Active Scripts


Active


Reported


Norco 5-325 Tablet (Acetaminophen/Hydrocodone Bitart) 1 Each Tablet 1-2 Tab PO 

PRN Q4-6HRS PRN


     LAST DOSE GIVEN:


     DATE:


     TIME:


Bactrim Ds Tablet (Sulfamethoxazole/Trimethoprim) 1 Each Tablet 1 Tab PO BID


Aleve (Naproxen Sodium) 220 Mg Capsule 440 Mg PO BID


Vitals/I & O





Vital Sign - Last 24 Hours








 8/1/17 8/1/17 8/1/17 8/1/17





 15:00 19:00 20:00 23:00


 


Temp 98.1 100.8  100.8





 98.1 100.8  100.8


 


Pulse 86 103  95


 


Resp 18 18  20


 


B/P (MAP) 149/86 (107) 162/86 (111)  152/76 (101)


 


Pulse Ox 96 97  95


 


O2 Delivery Room Air Room Air Room Air Room Air


 


    





    





 8/2/17 8/2/17 8/2/17 8/2/17





 02:16 03:00 06:13 07:00


 


Temp  99.7  98.8





  99.7  98.8


 


Pulse  91  91


 


Resp  18  20


 


B/P (MAP)  158/75 (102)  161/84 (109)


 


Pulse Ox  93  94


 


O2 Delivery Room Air Room Air Room Air Room Air


 


    





    





 8/2/17 8/2/17 8/2/17 





 07:56 10:32 11:00 


 


Temp   98.1 





   98.1 


 


Pulse  91 87 


 


Resp   20 


 


B/P (MAP)  161/84 152/83 (106) 


 


Pulse Ox   98 


 


O2 Delivery Room Air  Room Air 














Intake and Output   


 


 8/1/17 8/1/17 8/2/17





 15:00 23:00 07:00


 


Intake Total  350 ml 850 ml


 


Balance  350 ml 850 ml

















OLLIE DEWEY MD Aug 2, 2017 11:58

## 2017-08-02 NOTE — PDOC
SURGICAL PROGRESS NOTE


Subjective


Pt with c/o fatigue, mild pain in groin


Vital Signs





Vital Signs








  Date Time  Temp Pulse Resp B/P (MAP) Pulse Ox O2 Delivery O2 Flow Rate FiO2


 


8/2/17 07:56      Room Air  


 


8/2/17 03:00 99.7 91 18 158/75 (102) 93   





 99.7       








I&O











Intake and Output 


 


 8/2/17





 07:00


 


Intake Total 1200 ml


 


Balance 1200 ml


 


 


 


Intake Oral 1050 ml


 


IV Total 150 ml


 


# Voids 3








General:  Alert, Oriented X3, Cooperative, No acute distress


Skin:  Other (dressing c/d/i, induration minimal)


Labs





Laboratory Tests








Test


  7/31/17


10:39 7/31/17


16:57 7/31/17


20:26 8/1/17


03:00


 


Glucose (Fingerstick)


  333 mg/dL


(70-99) 316 mg/dL


(70-99) 326 mg/dL


(70-99) 


 


 


White Blood Count


  


  


  


  13.3 x10^3/uL


(4.0-11.0)


 


Red Blood Count


  


  


  


  3.60 x10^6/uL


(3.50-5.40)


 


Hemoglobin


  


  


  


  9.2 g/dL


(12.0-15.5)


 


Hematocrit


  


  


  


  28.8 %


(36.0-47.0)


 


Mean Corpuscular Volume    80 fL () 


 


Mean Corpuscular Hemoglobin    26 pg (25-35) 


 


Mean Corpuscular Hemoglobin


Concent 


  


  


  32 g/dL


(31-37)


 


Red Cell Distribution Width


  


  


  


  15.9 %


(11.5-14.5)


 


Platelet Count


  


  


  


  405 x10^3/uL


(140-400)


 


Neutrophils (%) (Auto)    65 % (31-73) 


 


Lymphocytes (%) (Auto)    24 % (24-48) 


 


Monocytes (%) (Auto)    8 % (0-9) 


 


Eosinophils (%) (Auto)    3 % (0-3) 


 


Basophils (%) (Auto)    0 % (0-3) 


 


Neutrophils # (Auto)


  


  


  


  8.7 x10^3uL


(1.8-7.7)


 


Lymphocytes # (Auto)


  


  


  


  3.2 x10^3/uL


(1.0-4.8)


 


Monocytes # (Auto)


  


  


  


  1.0 x10^3/uL


(0.0-1.1)


 


Eosinophils # (Auto)


  


  


  


  0.4 x10^3/uL


(0.0-0.7)


 


Basophils # (Auto)


  


  


  


  0.0 x10^3/uL


(0.0-0.2)


 


Sodium Level


  


  


  


  139 mmol/L


(136-145)


 


Potassium Level


  


  


  


  3.6 mmol/L


(3.5-5.1)


 


Chloride Level


  


  


  


  102 mmol/L


()


 


Carbon Dioxide Level


  


  


  


  27 mmol/L


(21-32)


 


Anion Gap    10 (6-14) 


 


Blood Urea Nitrogen    7 mg/dL (7-20) 


 


Creatinine


  


  


  


  0.8 mg/dL


(0.6-1.0)


 


Estimated GFR


(Cockcroft-Gault) 


  


  


  80.7 


 


 


Glucose Level


  


  


  


  242 mg/dL


(70-99)


 


Hemoglobin A1c


  


  


  


  12.9 %


(4.8-5.6)


 


Calcium Level


  


  


  


  8.4 mg/dL


(8.5-10.1)


 


Test


  8/1/17


08:07 8/1/17


11:53 8/1/17


16:27 8/1/17


21:06


 


Glucose (Fingerstick)


  220 mg/dL


(70-99) 289 mg/dL


(70-99) 246 mg/dL


(70-99) 251 mg/dL


(70-99)


 


Test


  8/2/17


03:23 8/2/17


07:42 


  


 


 


White Blood Count


  12.5 x10^3/uL


(4.0-11.0) 


  


  


 


 


Red Blood Count


  3.40 x10^6/uL


(3.50-5.40) 


  


  


 


 


Hemoglobin


  8.9 g/dL


(12.0-15.5) 


  


  


 


 


Hematocrit


  26.7 %


(36.0-47.0) 


  


  


 


 


Mean Corpuscular Volume 79 fL ()    


 


Mean Corpuscular Hemoglobin 26 pg (25-35)    


 


Mean Corpuscular Hemoglobin


Concent 33 g/dL


(31-37) 


  


  


 


 


Red Cell Distribution Width


  15.9 %


(11.5-14.5) 


  


  


 


 


Platelet Count


  402 x10^3/uL


(140-400) 


  


  


 


 


Neutrophils (%) (Auto) 64 % (31-73)    


 


Lymphocytes (%) (Auto) 26 % (24-48)    


 


Monocytes (%) (Auto) 7 % (0-9)    


 


Eosinophils (%) (Auto) 3 % (0-3)    


 


Basophils (%) (Auto) 0 % (0-3)    


 


Neutrophils # (Auto)


  7.9 x10^3uL


(1.8-7.7) 


  


  


 


 


Lymphocytes # (Auto)


  3.3 x10^3/uL


(1.0-4.8) 


  


  


 


 


Monocytes # (Auto)


  0.9 x10^3/uL


(0.0-1.1) 


  


  


 


 


Eosinophils # (Auto)


  0.3 x10^3/uL


(0.0-0.7) 


  


  


 


 


Basophils # (Auto)


  0.1 x10^3/uL


(0.0-0.2) 


  


  


 


 


Sodium Level


  140 mmol/L


(136-145) 


  


  


 


 


Potassium Level


  3.4 mmol/L


(3.5-5.1) 


  


  


 


 


Chloride Level


  105 mmol/L


() 


  


  


 


 


Carbon Dioxide Level


  26 mmol/L


(21-32) 


  


  


 


 


Anion Gap 9 (6-14)    


 


Blood Urea Nitrogen


  10 mg/dL


(7-20) 


  


  


 


 


Creatinine


  0.9 mg/dL


(0.6-1.0) 


  


  


 


 


Estimated GFR


(Cockcroft-Gault) 70.5 


  


  


  


 


 


Glucose Level


  216 mg/dL


(70-99) 


  


  


 


 


Calcium Level


  8.1 mg/dL


(8.5-10.1) 


  


  


 


 


Glucose (Fingerstick)


  


  225 mg/dL


(70-99) 


  


 








Laboratory Tests








Test


  8/1/17


08:07 8/1/17


11:53 8/1/17


16:27 8/1/17


21:06


 


Glucose (Fingerstick)


  220 mg/dL


(70-99) 289 mg/dL


(70-99) 246 mg/dL


(70-99) 251 mg/dL


(70-99)


 


Test


  8/2/17


03:23 8/2/17


07:42 


  


 


 


White Blood Count


  12.5 x10^3/uL


(4.0-11.0) 


  


  


 


 


Red Blood Count


  3.40 x10^6/uL


(3.50-5.40) 


  


  


 


 


Hemoglobin


  8.9 g/dL


(12.0-15.5) 


  


  


 


 


Hematocrit


  26.7 %


(36.0-47.0) 


  


  


 


 


Mean Corpuscular Volume 79 fL ()    


 


Mean Corpuscular Hemoglobin 26 pg (25-35)    


 


Mean Corpuscular Hemoglobin


Concent 33 g/dL


(31-37) 


  


  


 


 


Red Cell Distribution Width


  15.9 %


(11.5-14.5) 


  


  


 


 


Platelet Count


  402 x10^3/uL


(140-400) 


  


  


 


 


Neutrophils (%) (Auto) 64 % (31-73)    


 


Lymphocytes (%) (Auto) 26 % (24-48)    


 


Monocytes (%) (Auto) 7 % (0-9)    


 


Eosinophils (%) (Auto) 3 % (0-3)    


 


Basophils (%) (Auto) 0 % (0-3)    


 


Neutrophils # (Auto)


  7.9 x10^3uL


(1.8-7.7) 


  


  


 


 


Lymphocytes # (Auto)


  3.3 x10^3/uL


(1.0-4.8) 


  


  


 


 


Monocytes # (Auto)


  0.9 x10^3/uL


(0.0-1.1) 


  


  


 


 


Eosinophils # (Auto)


  0.3 x10^3/uL


(0.0-0.7) 


  


  


 


 


Basophils # (Auto)


  0.1 x10^3/uL


(0.0-0.2) 


  


  


 


 


Sodium Level


  140 mmol/L


(136-145) 


  


  


 


 


Potassium Level


  3.4 mmol/L


(3.5-5.1) 


  


  


 


 


Chloride Level


  105 mmol/L


() 


  


  


 


 


Carbon Dioxide Level


  26 mmol/L


(21-32) 


  


  


 


 


Anion Gap 9 (6-14)    


 


Blood Urea Nitrogen


  10 mg/dL


(7-20) 


  


  


 


 


Creatinine


  0.9 mg/dL


(0.6-1.0) 


  


  


 


 


Estimated GFR


(Cockcroft-Gault) 70.5 


  


  


  


 


 


Glucose Level


  216 mg/dL


(70-99) 


  


  


 


 


Calcium Level


  8.1 mg/dL


(8.5-10.1) 


  


  


 


 


Glucose (Fingerstick)


  


  225 mg/dL


(70-99) 


  


 








Problem List


s/p I and D


cont wound care


will need home health


Problems:  











NISHA RAMOS MD Aug 2, 2017 08:07

## 2017-08-02 NOTE — PDOC
Infectious Disease Note


Subjective


Subjective





Comfortable at the moment





ROS


ROS


GEN: Denies fevers, chills, sweats


HEENT: Denies blurred vision, sore throat


CV: Denies chest pain


RESP: Denies shortness of air, cough


GI: Denies n/v/d


NEURO: Denies confusion, dizziness


MSK: Denies weakness, joint pain/swelling





Vital Sign


Vital Signs





Vital Signs








  Date Time  Temp Pulse Resp B/P (MAP) Pulse Ox O2 Delivery O2 Flow Rate FiO2


 


8/2/17 07:56      Room Air  


 


8/2/17 07:00 98.8 91 20 161/84 (109) 94   





 98.8       











Physical Exam


PHYSICAL EXAM


GENERAL:  NAD, Alert


HEENT:  PERRL, OC/OP


NECK:  Supple, no JVD, no LN


LUNGS:  Clear


HEART:  S1S2, no gallop, no murmur


ABD:  Soft, NT, no organomegaly, no rebound


EXT:  No edema, no cyanosis


CNS:  Alert, oriented x 3, no focal neurologic deficit


SKIN:  No rash


IV: ok





Labs


Lab





Laboratory Tests








Test


  8/1/17


11:53 8/1/17


16:27 8/1/17


21:06 8/2/17


03:23


 


Glucose (Fingerstick)


  289 mg/dL


(70-99) 246 mg/dL


(70-99) 251 mg/dL


(70-99) 


 


 


White Blood Count


  


  


  


  12.5 x10^3/uL


(4.0-11.0)


 


Red Blood Count


  


  


  


  3.40 x10^6/uL


(3.50-5.40)


 


Hemoglobin


  


  


  


  8.9 g/dL


(12.0-15.5)


 


Hematocrit


  


  


  


  26.7 %


(36.0-47.0)


 


Mean Corpuscular Volume    79 fL () 


 


Mean Corpuscular Hemoglobin    26 pg (25-35) 


 


Mean Corpuscular Hemoglobin


Concent 


  


  


  33 g/dL


(31-37)


 


Red Cell Distribution Width


  


  


  


  15.9 %


(11.5-14.5)


 


Platelet Count


  


  


  


  402 x10^3/uL


(140-400)


 


Neutrophils (%) (Auto)    64 % (31-73) 


 


Lymphocytes (%) (Auto)    26 % (24-48) 


 


Monocytes (%) (Auto)    7 % (0-9) 


 


Eosinophils (%) (Auto)    3 % (0-3) 


 


Basophils (%) (Auto)    0 % (0-3) 


 


Neutrophils # (Auto)


  


  


  


  7.9 x10^3uL


(1.8-7.7)


 


Lymphocytes # (Auto)


  


  


  


  3.3 x10^3/uL


(1.0-4.8)


 


Monocytes # (Auto)


  


  


  


  0.9 x10^3/uL


(0.0-1.1)


 


Eosinophils # (Auto)


  


  


  


  0.3 x10^3/uL


(0.0-0.7)


 


Basophils # (Auto)


  


  


  


  0.1 x10^3/uL


(0.0-0.2)


 


Sodium Level


  


  


  


  140 mmol/L


(136-145)


 


Potassium Level


  


  


  


  3.4 mmol/L


(3.5-5.1)


 


Chloride Level


  


  


  


  105 mmol/L


()


 


Carbon Dioxide Level


  


  


  


  26 mmol/L


(21-32)


 


Anion Gap    9 (6-14) 


 


Blood Urea Nitrogen


  


  


  


  10 mg/dL


(7-20)


 


Creatinine


  


  


  


  0.9 mg/dL


(0.6-1.0)


 


Estimated GFR


(Cockcroft-Gault) 


  


  


  70.5 


 


 


Glucose Level


  


  


  


  216 mg/dL


(70-99)


 


Calcium Level


  


  


  


  8.1 mg/dL


(8.5-10.1)


 


Test


  8/2/17


07:42 


  


  


 


 


Glucose (Fingerstick)


  225 mg/dL


(70-99) 


  


  


 








Micro


 ANAEROBIC-AEROBIC CULTURE  


                                PENDING





  ANAEROBIC RES 1  


                                PENDING





  AEROBIC CULT  Preliminary  


        Preliminary report





  AEROBIC RES 1  Preliminary  


        Comment





        Beta hemolytic Streptococcus, group B


        Heavy growth


        Penicillin and ampicillin are drugs of choice for treatment


        of beta-hemolytic streptococcal infections. Susceptibility


        testing of penicillins and other beta-lactam agents


        approved by the FDA for treatment of beta-hemolytic


        streptococcal infections need not be performed routinely


        because nonsusceptible isolates are extremely rare in any


        beta-hemolytic streptococcus and have not been reported


        for Streptococcus pyogenes (group A). (CLSI 2011)


        Performed at:  [address]  [id number]


        : Angie Watkins MD, Phone:  [phone]





Objective


Assessment


Abscess right groin/thigh area. s/p excisional debridement of necrotic tissue 

down to adipose tissue of right groin, and I and D, 7/29. Intra-op cultures 

Group B strep


 -s/p previous I and D on 7/25. group B Strep, anaerobic cx still pending.


Leukocytosis 


 -h/o pre-op steroids on 7/25 


Diabetes


Super morbid obesity, BMI 62





Plan


Plan of Care


 po augmentin


Supportive care 





ct abd and pelvis neg











JEREMIAH CISSE MD Aug 2, 2017 10:11

## 2017-08-03 VITALS — DIASTOLIC BLOOD PRESSURE: 87 MMHG | SYSTOLIC BLOOD PRESSURE: 141 MMHG

## 2017-08-03 VITALS — SYSTOLIC BLOOD PRESSURE: 134 MMHG | DIASTOLIC BLOOD PRESSURE: 79 MMHG

## 2017-08-03 VITALS — SYSTOLIC BLOOD PRESSURE: 140 MMHG | DIASTOLIC BLOOD PRESSURE: 80 MMHG

## 2017-08-03 LAB
ANION GAP SERPL CALC-SCNC: 8 MMOL/L (ref 6–14)
BASOPHILS # BLD AUTO: 0 X10^3/UL (ref 0–0.2)
BASOPHILS NFR BLD: 0 % (ref 0–3)
BUN SERPL-MCNC: 11 MG/DL (ref 7–20)
CALCIUM SERPL-MCNC: 7.9 MG/DL (ref 8.5–10.1)
CHLORIDE SERPL-SCNC: 105 MMOL/L (ref 98–107)
CO2 SERPL-SCNC: 26 MMOL/L (ref 21–32)
CREAT SERPL-MCNC: 0.8 MG/DL (ref 0.6–1)
EOSINOPHIL NFR BLD: 3 % (ref 0–3)
ERYTHROCYTE [DISTWIDTH] IN BLOOD BY AUTOMATED COUNT: 15.9 % (ref 11.5–14.5)
GFR SERPLBLD BASED ON 1.73 SQ M-ARVRAT: 80.7 ML/MIN
GLUCOSE SERPL-MCNC: 252 MG/DL (ref 70–99)
HCT VFR BLD CALC: 26.4 % (ref 36–47)
HGB BLD-MCNC: 8.4 G/DL (ref 12–15.5)
LYMPHOCYTES # BLD: 2.7 X10^3/UL (ref 1–4.8)
LYMPHOCYTES NFR BLD AUTO: 24 % (ref 24–48)
MCH RBC QN AUTO: 26 PG (ref 25–35)
MCHC RBC AUTO-ENTMCNC: 32 G/DL (ref 31–37)
MCV RBC AUTO: 80 FL (ref 79–100)
MONOCYTES NFR BLD: 7 % (ref 0–9)
NEUTROPHILS NFR BLD AUTO: 66 % (ref 31–73)
PLATELET # BLD AUTO: 370 X10^3/UL (ref 140–400)
POTASSIUM SERPL-SCNC: 3.9 MMOL/L (ref 3.5–5.1)
RBC # BLD AUTO: 3.28 X10^6/UL (ref 3.5–5.4)
SODIUM SERPL-SCNC: 139 MMOL/L (ref 136–145)
WBC # BLD AUTO: 11.1 X10^3/UL (ref 4–11)

## 2017-08-03 RX ADMIN — INSULIN ASPART SCH UNITS: 100 INJECTION, SOLUTION INTRAVENOUS; SUBCUTANEOUS at 13:40

## 2017-08-03 RX ADMIN — HYDROCODONE BITARTRATE AND ACETAMINOPHEN PRN TAB: 5; 325 TABLET ORAL at 00:36

## 2017-08-03 RX ADMIN — HYDROCODONE BITARTRATE AND ACETAMINOPHEN PRN TAB: 5; 325 TABLET ORAL at 11:51

## 2017-08-03 RX ADMIN — INSULIN ASPART SCH UNITS: 100 INJECTION, SOLUTION INTRAVENOUS; SUBCUTANEOUS at 08:14

## 2017-08-03 RX ADMIN — INSULIN ASPART SCH UNITS: 100 INJECTION, SOLUTION INTRAVENOUS; SUBCUTANEOUS at 11:59

## 2017-08-03 RX ADMIN — ENOXAPARIN SODIUM SCH MG: 100 INJECTION SUBCUTANEOUS at 08:03

## 2017-08-03 RX ADMIN — INSULIN ASPART SCH UNITS: 100 INJECTION, SOLUTION INTRAVENOUS; SUBCUTANEOUS at 17:21

## 2017-08-03 RX ADMIN — AMOXICILLIN AND CLAVULANATE POTASSIUM SCH TAB: 875; 125 TABLET, FILM COATED ORAL at 07:57

## 2017-08-03 RX ADMIN — INSULIN ASPART SCH UNITS: 100 INJECTION, SOLUTION INTRAVENOUS; SUBCUTANEOUS at 12:14

## 2017-08-03 RX ADMIN — LISINOPRIL SCH MG: 20 TABLET ORAL at 08:01

## 2017-08-03 RX ADMIN — INSULIN ASPART SCH UNITS: 100 INJECTION, SOLUTION INTRAVENOUS; SUBCUTANEOUS at 17:26

## 2017-08-03 RX ADMIN — SENNOSIDES AND DOCUSATE SODIUM SCH TAB: 8.6; 5 TABLET ORAL at 08:00

## 2017-08-03 NOTE — PDOC
Infectious Disease Note


Subjective


Subjective





Comfortable at the moment





ROS


ROS


GEN: Denies fevers, chills, sweats


HEENT: Denies blurred vision, sore throat


CV: Denies chest pain


RESP: Denies shortness of air, cough


GI: Denies n/v/d


NEURO: Denies confusion, dizziness


MSK: Denies weakness, joint pain/swelling





Vital Sign


Vital Signs





Vital Signs








  Date Time  Temp Pulse Resp B/P (MAP) Pulse Ox O2 Delivery O2 Flow Rate FiO2


 


8/3/17 08:01  97  141/87    


 


8/3/17 08:00      Room Air  


 


8/3/17 07:00 97.9  18  98   





 97.9       











Physical Exam


PHYSICAL EXAM


GENERAL:  NAD, Alert


HEENT:  PERRL, OC/OP


NECK:  Supple, no JVD, no LN


LUNGS:  Clear


HEART:  S1S2, no gallop, no murmur


ABD:  Soft, NT, no organomegaly, no rebound


EXT:  No edema, no cyanosis


CNS:  Alert, oriented x 3, no focal neurologic deficit


SKIN:  No rash


IV: ok





Labs


Lab





Laboratory Tests








Test


  8/2/17


16:36 8/2/17


21:51 8/3/17


04:20 8/3/17


07:41


 


Glucose (Fingerstick)


  279 mg/dL


(70-99) 239 mg/dL


(70-99) 


  225 mg/dL


(70-99)


 


White Blood Count


  


  


  11.1 x10^3/uL


(4.0-11.0) 


 


 


Red Blood Count


  


  


  3.28 x10^6/uL


(3.50-5.40) 


 


 


Hemoglobin


  


  


  8.4 g/dL


(12.0-15.5) 


 


 


Hematocrit


  


  


  26.4 %


(36.0-47.0) 


 


 


Mean Corpuscular Volume   80 fL ()  


 


Mean Corpuscular Hemoglobin   26 pg (25-35)  


 


Mean Corpuscular Hemoglobin


Concent 


  


  32 g/dL


(31-37) 


 


 


Red Cell Distribution Width


  


  


  15.9 %


(11.5-14.5) 


 


 


Platelet Count


  


  


  370 x10^3/uL


(140-400) 


 


 


Neutrophils (%) (Auto)   66 % (31-73)  


 


Lymphocytes (%) (Auto)   24 % (24-48)  


 


Monocytes (%) (Auto)   7 % (0-9)  


 


Eosinophils (%) (Auto)   3 % (0-3)  


 


Basophils (%) (Auto)   0 % (0-3)  


 


Neutrophils # (Auto)


  


  


  7.3 x10^3uL


(1.8-7.7) 


 


 


Lymphocytes # (Auto)


  


  


  2.7 x10^3/uL


(1.0-4.8) 


 


 


Monocytes # (Auto)


  


  


  0.7 x10^3/uL


(0.0-1.1) 


 


 


Eosinophils # (Auto)


  


  


  0.3 x10^3/uL


(0.0-0.7) 


 


 


Basophils # (Auto)


  


  


  0.0 x10^3/uL


(0.0-0.2) 


 


 


Sodium Level


  


  


  139 mmol/L


(136-145) 


 


 


Potassium Level


  


  


  3.9 mmol/L


(3.5-5.1) 


 


 


Chloride Level


  


  


  105 mmol/L


() 


 


 


Carbon Dioxide Level


  


  


  26 mmol/L


(21-32) 


 


 


Anion Gap   8 (6-14)  


 


Blood Urea Nitrogen


  


  


  11 mg/dL


(7-20) 


 


 


Creatinine


  


  


  0.8 mg/dL


(0.6-1.0) 


 


 


Estimated GFR


(Cockcroft-Gault) 


  


  80.7 


  


 


 


Glucose Level


  


  


  252 mg/dL


(70-99) 


 


 


Calcium Level


  


  


  7.9 mg/dL


(8.5-10.1) 


 








Micro


 ANAEROBIC-AEROBIC CULTURE  


                                PENDING





  ANAEROBIC RES 1  


                                PENDING





  AEROBIC CULT  Preliminary  


        Preliminary report





  AEROBIC RES 1  Preliminary  


        Comment





        Beta hemolytic Streptococcus, group B


        Heavy growth


        Penicillin and ampicillin are drugs of choice for treatment


        of beta-hemolytic streptococcal infections. Susceptibility


        testing of penicillins and other beta-lactam agents


        approved by the FDA for treatment of beta-hemolytic


        streptococcal infections need not be performed routinely


        because nonsusceptible isolates are extremely rare in any


        beta-hemolytic streptococcus and have not been reported


        for Streptococcus pyogenes (group A). (CLSI 2011)


        Performed at:  [address]  [id number]


        : Angie Watkins MD, Phone:  [phone]





Objective


Assessment


Abscess right groin/thigh area. s/p excisional debridement of necrotic tissue 

down to adipose tissue of right groin, and I and D, 7/29. Intra-op cultures 

Group B strep


 -s/p previous I and D on 7/25. group B Strep, anaerobic cx still pending.


Leukocytosis 


 -h/o pre-op steroids on 7/25 


Diabetes


Super morbid obesity, BMI 62





Plan


Plan of Care


 po augmentin


Supportive care 





ct abd and pelvis neg











JEREMIAH CISSE MD Aug 3, 2017 10:40

## 2017-08-03 NOTE — PDOC3
Discharge Summary [facility]


Date of Admission:  Jul 28, 2017


Discharge Date:  Aug 3, 2017


Admitting Diagnosis


  abscess right groin/thigh area. s/p excisional debridement of necrotic tissue 

down to adipose tissue of right groin, and I and D, 7/29.  


 -s/p previous I and D on 7/25. group B Strep, anaerobic cx still pending.


sepsis


 -h/o pre-op steroids on 7/25 


Diabetes uncontrolled


Super morbid obesity, BMI 62


hypokalemia


Problems:  


CONSULTS


id


sx


Brief Hospital Course


Ms. Gay  is a 37 oldF, known DM2, but not treated, came for right thigh 

pain and induration. She was found an abscess which got i and d on 7/29, cx + 

strep.


now the induration is better, wbc better, fever better, still has the wound 

packing and a drainage. 


dc home with HH, cont augmentin x7ds. fu with sx in 1 week to remove the 

drainage. 


also prescribed levemir 40u qhs, aspart 15u tid. need to find a pcp asap. pt 

has insurance,educated x10min. 


dc time 35min. 








  General:  Alert, Oriented X3, Cooperative, No acute distress


Heart:  Regular rate


Lungs:  Clear, Other (No RRW)


Abdomen:  Normal bowel sounds, Soft, No tenderness, No masses,. 


Extremities:  No clubbing, No cyanosis


Skin: right thigh has the wound with packing and drainage, induration much 

better.


Problems:  


Disposition


home H


CONDITION AT DISCHARGE:  Improved


Diet


ada


Scheduled


Amoxicillin/Potassium Clav (Amox Tr-K Clv 875-125 Mg Tab), 1 TAB PO BID


Insulin Aspart (Novolog Flexpen), 15 UNITS SQ TIDAC


Insulin Detemir (Levemir Flextouch), 40 UNITS SQ QHS


Lisinopril (Lisinopril), 20 MG PO DAILY





Scheduled PRN


Hydrocodone Bit/Acetaminophen (Hydrocodone-Apap 5-325  **), 1 TAB PO PRN Q4HRS 

PRN for MILD PAIN


Hydrocodone/Apap 5-325 (Norco 5-325 Tablet), 1-2 TAB PO PRN Q4-6HRS PRN for PAIN

, (Reported)





Discontinued Medications


Naproxen Sodium (Aleve), 440 MG PO BID, (Reported)


Sulfamethoxazole/Trimethoprim (Bactrim Ds Tablet), 1 TAB PO BID, (Reported)


Follow Up


sx and id in 1-2 weeks











OLLIE DEWEY MD Aug 3, 2017 11:48

## 2017-08-03 NOTE — PDOC
SURGICAL PROGRESS NOTE


Subjective


Pt feels better, min pain


Vital Signs





Vital Signs








  Date Time  Temp Pulse Resp B/P (MAP) Pulse Ox O2 Delivery O2 Flow Rate FiO2


 


8/3/17 08:01  97  141/87    


 


8/3/17 08:00      Room Air  


 


8/3/17 07:00 97.9  18  98   





 97.9       








I&O











Intake and Output 


 


 8/3/17





 07:00


 


Intake Total 1700 ml


 


Balance 1700 ml


 


 


 


Intake Oral 1700 ml


 


# Voids 5








General:  Alert, Oriented X3, Cooperative, No acute distress


Skin:  Other (dressing intact, less induration)


Labs





Laboratory Tests








Test


  8/1/17


11:53 8/1/17


16:27 8/1/17


21:06 8/2/17


03:23


 


Glucose (Fingerstick)


  289 mg/dL


(70-99) 246 mg/dL


(70-99) 251 mg/dL


(70-99) 


 


 


White Blood Count


  


  


  


  12.5 x10^3/uL


(4.0-11.0)


 


Red Blood Count


  


  


  


  3.40 x10^6/uL


(3.50-5.40)


 


Hemoglobin


  


  


  


  8.9 g/dL


(12.0-15.5)


 


Hematocrit


  


  


  


  26.7 %


(36.0-47.0)


 


Mean Corpuscular Volume    79 fL () 


 


Mean Corpuscular Hemoglobin    26 pg (25-35) 


 


Mean Corpuscular Hemoglobin


Concent 


  


  


  33 g/dL


(31-37)


 


Red Cell Distribution Width


  


  


  


  15.9 %


(11.5-14.5)


 


Platelet Count


  


  


  


  402 x10^3/uL


(140-400)


 


Neutrophils (%) (Auto)    64 % (31-73) 


 


Lymphocytes (%) (Auto)    26 % (24-48) 


 


Monocytes (%) (Auto)    7 % (0-9) 


 


Eosinophils (%) (Auto)    3 % (0-3) 


 


Basophils (%) (Auto)    0 % (0-3) 


 


Neutrophils # (Auto)


  


  


  


  7.9 x10^3uL


(1.8-7.7)


 


Lymphocytes # (Auto)


  


  


  


  3.3 x10^3/uL


(1.0-4.8)


 


Monocytes # (Auto)


  


  


  


  0.9 x10^3/uL


(0.0-1.1)


 


Eosinophils # (Auto)


  


  


  


  0.3 x10^3/uL


(0.0-0.7)


 


Basophils # (Auto)


  


  


  


  0.1 x10^3/uL


(0.0-0.2)


 


Sodium Level


  


  


  


  140 mmol/L


(136-145)


 


Potassium Level


  


  


  


  3.4 mmol/L


(3.5-5.1)


 


Chloride Level


  


  


  


  105 mmol/L


()


 


Carbon Dioxide Level


  


  


  


  26 mmol/L


(21-32)


 


Anion Gap    9 (6-14) 


 


Blood Urea Nitrogen


  


  


  


  10 mg/dL


(7-20)


 


Creatinine


  


  


  


  0.9 mg/dL


(0.6-1.0)


 


Estimated GFR


(Cockcroft-Gault) 


  


  


  70.5 


 


 


Glucose Level


  


  


  


  216 mg/dL


(70-99)


 


Calcium Level


  


  


  


  8.1 mg/dL


(8.5-10.1)


 


Test


  8/2/17


07:42 8/2/17


09:58 8/2/17


16:36 8/2/17


21:51


 


Glucose (Fingerstick)


  225 mg/dL


(70-99) 243 mg/dL


(70-99) 279 mg/dL


(70-99) 239 mg/dL


(70-99)


 


Test


  8/3/17


04:20 8/3/17


07:41 


  


 


 


White Blood Count


  11.1 x10^3/uL


(4.0-11.0) 


  


  


 


 


Red Blood Count


  3.28 x10^6/uL


(3.50-5.40) 


  


  


 


 


Hemoglobin


  8.4 g/dL


(12.0-15.5) 


  


  


 


 


Hematocrit


  26.4 %


(36.0-47.0) 


  


  


 


 


Mean Corpuscular Volume 80 fL ()    


 


Mean Corpuscular Hemoglobin 26 pg (25-35)    


 


Mean Corpuscular Hemoglobin


Concent 32 g/dL


(31-37) 


  


  


 


 


Red Cell Distribution Width


  15.9 %


(11.5-14.5) 


  


  


 


 


Platelet Count


  370 x10^3/uL


(140-400) 


  


  


 


 


Neutrophils (%) (Auto) 66 % (31-73)    


 


Lymphocytes (%) (Auto) 24 % (24-48)    


 


Monocytes (%) (Auto) 7 % (0-9)    


 


Eosinophils (%) (Auto) 3 % (0-3)    


 


Basophils (%) (Auto) 0 % (0-3)    


 


Neutrophils # (Auto)


  7.3 x10^3uL


(1.8-7.7) 


  


  


 


 


Lymphocytes # (Auto)


  2.7 x10^3/uL


(1.0-4.8) 


  


  


 


 


Monocytes # (Auto)


  0.7 x10^3/uL


(0.0-1.1) 


  


  


 


 


Eosinophils # (Auto)


  0.3 x10^3/uL


(0.0-0.7) 


  


  


 


 


Basophils # (Auto)


  0.0 x10^3/uL


(0.0-0.2) 


  


  


 


 


Sodium Level


  139 mmol/L


(136-145) 


  


  


 


 


Potassium Level


  3.9 mmol/L


(3.5-5.1) 


  


  


 


 


Chloride Level


  105 mmol/L


() 


  


  


 


 


Carbon Dioxide Level


  26 mmol/L


(21-32) 


  


  


 


 


Anion Gap 8 (6-14)    


 


Blood Urea Nitrogen


  11 mg/dL


(7-20) 


  


  


 


 


Creatinine


  0.8 mg/dL


(0.6-1.0) 


  


  


 


 


Estimated GFR


(Cockcroft-Gault) 80.7 


  


  


  


 


 


Glucose Level


  252 mg/dL


(70-99) 


  


  


 


 


Calcium Level


  7.9 mg/dL


(8.5-10.1) 


  


  


 


 


Glucose (Fingerstick)


  


  225 mg/dL


(70-99) 


  


 








Laboratory Tests








Test


  8/2/17


09:58 8/2/17


16:36 8/2/17


21:51 8/3/17


04:20


 


Glucose (Fingerstick)


  243 mg/dL


(70-99) 279 mg/dL


(70-99) 239 mg/dL


(70-99) 


 


 


White Blood Count


  


  


  


  11.1 x10^3/uL


(4.0-11.0)


 


Red Blood Count


  


  


  


  3.28 x10^6/uL


(3.50-5.40)


 


Hemoglobin


  


  


  


  8.4 g/dL


(12.0-15.5)


 


Hematocrit


  


  


  


  26.4 %


(36.0-47.0)


 


Mean Corpuscular Volume    80 fL () 


 


Mean Corpuscular Hemoglobin    26 pg (25-35) 


 


Mean Corpuscular Hemoglobin


Concent 


  


  


  32 g/dL


(31-37)


 


Red Cell Distribution Width


  


  


  


  15.9 %


(11.5-14.5)


 


Platelet Count


  


  


  


  370 x10^3/uL


(140-400)


 


Neutrophils (%) (Auto)    66 % (31-73) 


 


Lymphocytes (%) (Auto)    24 % (24-48) 


 


Monocytes (%) (Auto)    7 % (0-9) 


 


Eosinophils (%) (Auto)    3 % (0-3) 


 


Basophils (%) (Auto)    0 % (0-3) 


 


Neutrophils # (Auto)


  


  


  


  7.3 x10^3uL


(1.8-7.7)


 


Lymphocytes # (Auto)


  


  


  


  2.7 x10^3/uL


(1.0-4.8)


 


Monocytes # (Auto)


  


  


  


  0.7 x10^3/uL


(0.0-1.1)


 


Eosinophils # (Auto)


  


  


  


  0.3 x10^3/uL


(0.0-0.7)


 


Basophils # (Auto)


  


  


  


  0.0 x10^3/uL


(0.0-0.2)


 


Sodium Level


  


  


  


  139 mmol/L


(136-145)


 


Potassium Level


  


  


  


  3.9 mmol/L


(3.5-5.1)


 


Chloride Level


  


  


  


  105 mmol/L


()


 


Carbon Dioxide Level


  


  


  


  26 mmol/L


(21-32)


 


Anion Gap    8 (6-14) 


 


Blood Urea Nitrogen


  


  


  


  11 mg/dL


(7-20)


 


Creatinine


  


  


  


  0.8 mg/dL


(0.6-1.0)


 


Estimated GFR


(Cockcroft-Gault) 


  


  


  80.7 


 


 


Glucose Level


  


  


  


  252 mg/dL


(70-99)


 


Calcium Level


  


  


  


  7.9 mg/dL


(8.5-10.1)


 


Test


  8/3/17


07:41 


  


  


 


 


Glucose (Fingerstick)


  225 mg/dL


(70-99) 


  


  


 








Problem List


s/p I and D


OK to d/c with home health


f/u with [facility] in one week for drain removal


Problems:  











NISHA RAMOS MD Aug 3, 2017 09:52

## 2021-09-20 ENCOUNTER — HOSPITAL ENCOUNTER (OUTPATIENT)
Dept: HOSPITAL 63 - LAB | Age: 41
End: 2021-09-20
Attending: OPHTHALMOLOGY
Payer: COMMERCIAL

## 2021-09-20 DIAGNOSIS — H26.9: ICD-10-CM

## 2021-09-20 DIAGNOSIS — Z01.812: Primary | ICD-10-CM

## 2021-09-20 DIAGNOSIS — Z20.822: ICD-10-CM

## 2021-09-20 PROCEDURE — U0003 INFECTIOUS AGENT DETECTION BY NUCLEIC ACID (DNA OR RNA); SEVERE ACUTE RESPIRATORY SYNDROME CORONAVIRUS 2 (SARS-COV-2) (CORONAVIRUS DISEASE [COVID-19]), AMPLIFIED PROBE TECHNIQUE, MAKING USE OF HIGH THROUGHPUT TECHNOLOGIES AS DESCRIBED BY CMS-2020-01-R: HCPCS

## 2021-09-20 PROCEDURE — 82947 ASSAY GLUCOSE BLOOD QUANT: CPT

## 2021-09-23 ENCOUNTER — HOSPITAL ENCOUNTER (OUTPATIENT)
Dept: HOSPITAL 63 - SURG | Age: 41
Discharge: HOME | End: 2021-09-23
Attending: OPHTHALMOLOGY
Payer: COMMERCIAL

## 2021-09-23 VITALS
SYSTOLIC BLOOD PRESSURE: 183 MMHG | SYSTOLIC BLOOD PRESSURE: 183 MMHG | DIASTOLIC BLOOD PRESSURE: 124 MMHG | DIASTOLIC BLOOD PRESSURE: 124 MMHG

## 2021-09-23 DIAGNOSIS — Z79.899: ICD-10-CM

## 2021-09-23 DIAGNOSIS — Z53.8: ICD-10-CM

## 2021-09-23 DIAGNOSIS — H25.89: ICD-10-CM

## 2021-09-23 DIAGNOSIS — E11.36: Primary | ICD-10-CM

## 2021-09-23 DIAGNOSIS — Z79.4: ICD-10-CM

## 2021-09-23 DIAGNOSIS — J45.909: ICD-10-CM

## 2021-09-23 DIAGNOSIS — Z98.890: ICD-10-CM

## 2021-09-23 DIAGNOSIS — I10: ICD-10-CM

## 2021-09-23 LAB — U PREG PATIENT: NEGATIVE

## 2021-09-23 PROCEDURE — 81025 URINE PREGNANCY TEST: CPT

## 2021-09-23 RX ADMIN — KETOROLAC TROMETHAMINE SCH DROP: 5 SOLUTION OPHTHALMIC at 11:35

## 2021-09-23 RX ADMIN — PHENYLEPHRINE HYDROCHLORIDE SCH DROP: 25 SOLUTION/ DROPS OPHTHALMIC at 11:44

## 2021-09-23 RX ADMIN — TROPICAMIDE SCH DROP: 10 SOLUTION/ DROPS OPHTHALMIC at 11:44

## 2021-09-23 RX ADMIN — TOBRAMYCIN SCH DROP: 3 SOLUTION OPHTHALMIC at 11:27

## 2021-09-23 RX ADMIN — KETOROLAC TROMETHAMINE SCH DROP: 5 SOLUTION OPHTHALMIC at 11:27

## 2021-09-23 RX ADMIN — TROPICAMIDE SCH DROP: 10 SOLUTION/ DROPS OPHTHALMIC at 11:27

## 2021-09-23 RX ADMIN — TROPICAMIDE SCH DROP: 10 SOLUTION/ DROPS OPHTHALMIC at 11:35

## 2021-09-23 RX ADMIN — PHENYLEPHRINE HYDROCHLORIDE SCH DROP: 25 SOLUTION/ DROPS OPHTHALMIC at 11:35

## 2021-09-23 RX ADMIN — TOBRAMYCIN SCH DROP: 3 SOLUTION OPHTHALMIC at 11:35

## 2021-09-23 RX ADMIN — PHENYLEPHRINE HYDROCHLORIDE SCH DROP: 25 SOLUTION/ DROPS OPHTHALMIC at 11:27

## 2021-09-23 NOTE — NUR
multiple elevated blood pressure readings 200s/100s. Anesthesia informed.  Physician and 
nurse anesthetist discussing contraindications for procedure with patient.  Patient informed 
of need to postpone procedure. PCP office notified and appointment thisa afternoon to 
address blood pressures. Patient verbalized understanding.

## 2021-10-11 ENCOUNTER — HOSPITAL ENCOUNTER (OUTPATIENT)
Dept: HOSPITAL 63 - LAB | Age: 41
End: 2021-10-11
Attending: OPHTHALMOLOGY
Payer: COMMERCIAL

## 2021-10-11 DIAGNOSIS — Z98.42: ICD-10-CM

## 2021-10-11 DIAGNOSIS — Z01.812: Primary | ICD-10-CM

## 2021-10-11 DIAGNOSIS — Z20.822: ICD-10-CM

## 2021-10-11 PROCEDURE — U0003 INFECTIOUS AGENT DETECTION BY NUCLEIC ACID (DNA OR RNA); SEVERE ACUTE RESPIRATORY SYNDROME CORONAVIRUS 2 (SARS-COV-2) (CORONAVIRUS DISEASE [COVID-19]), AMPLIFIED PROBE TECHNIQUE, MAKING USE OF HIGH THROUGHPUT TECHNOLOGIES AS DESCRIBED BY CMS-2020-01-R: HCPCS

## 2021-10-14 ENCOUNTER — HOSPITAL ENCOUNTER (OUTPATIENT)
Dept: HOSPITAL 63 - SURG | Age: 41
Discharge: HOME | End: 2021-10-14
Attending: OPHTHALMOLOGY
Payer: COMMERCIAL

## 2021-10-14 VITALS — SYSTOLIC BLOOD PRESSURE: 143 MMHG | DIASTOLIC BLOOD PRESSURE: 83 MMHG

## 2021-10-14 DIAGNOSIS — E11.39: Primary | ICD-10-CM

## 2021-10-14 DIAGNOSIS — J45.909: ICD-10-CM

## 2021-10-14 DIAGNOSIS — I10: ICD-10-CM

## 2021-10-14 DIAGNOSIS — H25.12: ICD-10-CM

## 2021-10-14 DIAGNOSIS — Z79.899: ICD-10-CM

## 2021-10-14 DIAGNOSIS — E66.9: ICD-10-CM

## 2021-10-14 PROCEDURE — V2632 POST CHMBR INTRAOCULAR LENS: HCPCS

## 2021-10-14 PROCEDURE — 66984 XCAPSL CTRC RMVL W/O ECP: CPT

## 2021-10-14 PROCEDURE — 82947 ASSAY GLUCOSE BLOOD QUANT: CPT

## 2021-10-14 RX ADMIN — PHENYLEPHRINE HYDROCHLORIDE SCH DROP: 25 SOLUTION/ DROPS OPHTHALMIC at 07:23

## 2021-10-14 RX ADMIN — TROPICAMIDE SCH DROP: 10 SOLUTION/ DROPS OPHTHALMIC at 07:12

## 2021-10-14 RX ADMIN — KETOROLAC TROMETHAMINE SCH DROP: 5 SOLUTION OPHTHALMIC at 07:18

## 2021-10-14 RX ADMIN — PHENYLEPHRINE HYDROCHLORIDE SCH DROP: 25 SOLUTION/ DROPS OPHTHALMIC at 07:18

## 2021-10-14 RX ADMIN — KETOROLAC TROMETHAMINE SCH DROP: 5 SOLUTION OPHTHALMIC at 07:12

## 2021-10-14 RX ADMIN — TOBRAMYCIN SCH DROP: 3 SOLUTION OPHTHALMIC at 07:18

## 2021-10-14 RX ADMIN — TOBRAMYCIN SCH DROP: 3 SOLUTION OPHTHALMIC at 07:11

## 2021-10-14 RX ADMIN — PHENYLEPHRINE HYDROCHLORIDE SCH DROP: 25 SOLUTION/ DROPS OPHTHALMIC at 07:12

## 2021-10-14 RX ADMIN — TROPICAMIDE SCH DROP: 10 SOLUTION/ DROPS OPHTHALMIC at 07:18

## 2021-10-14 RX ADMIN — TROPICAMIDE SCH DROP: 10 SOLUTION/ DROPS OPHTHALMIC at 07:23

## 2021-10-14 NOTE — PDOC4
SURGEON:


Janiya Ramirez MD


Date of Procedure:   10/14/21





PREOP Diagnosis


Visually significant cataract:  Left Eye OS





POSTOP Diagnosis


Same





PROCEDURE:


Phaco w/ posterior chamber IOL:  Left Eye OS





ANESTHESIA


                  








 Deep forniceal periocular 2% Lidocaine jelly














 Olamide/retro bulbar block with 2% Lidocaine with 0.5% Marcaine











DESCRIPTION OF PROCEDURE


The risks, benefits, and alternatives were discussed with the patient who 

elected to proceed.  Informed consent was obtained in writing and placed in the 

chart  After anesthetizing the eye topically, the patient was taken to the 

operating room, and the operative eye was prepped and draped in the usual 

sterile fashion for ocular surgery.  A wire lid speculum was placed.





A 1-mm clear corneal paracentesis incision was created with the side-port blade 

at a position three o'clock hours clockwise from the temporal cornea.  Then, 1% 

non-preserved Lidocaine with epinephrine was injected into the anterior chamber 

followed by viscoelastic.  Cotton-tipped applicators were used to stabilize the 

globe, and a 2.4 mm keratome was used to create a self-sealing incision in clear

cornea at the temporal limbus.  The Utrata forceps were used to create a 

continuous curvilinear capsulorrhexis.  Balanced saline solution was injected 

via cannula beneath the capsulorrhexis edge to hydrodissect the lens nucleus and

cortex from the lens capsule.  The phacoemulsification handpiece and a chopping 

instrument were then used to remove the lens nucleus.  The remaining epinuclear 

material and cortex were removed with the irrigation/aspiration handpiece.  Vi

scoelastic was used to re-inflate the lens capsule, and the intraocular lens was

injected directly into the capsular bag.  The corneal wound edges were hydrated 

with balanced salt solution on a cannula and the irrigation/aspiration


handpiece was used to extract the remaining viscoelastic.   Cefuroxime 0.1mg/ml 

/ Vigamox 0.5% was injected into the anterior chamber intracamerally.  The 

wounds were inspected and found to be watertight at an appropriate intraocular 

pressure.  Topical antibiotic drops were placed on the corneal surface.


LRI:  No


                               


If Yes,  Number []       Axis []         Length [] degrees         Depth [] 

microns


Incision Axis:  180


               


Toric Lens Axis []


Patch/shield with Maxitrol/Tobradex/Erythromycin ointment: 








 Yes














 No 








                            


                                 


Co-managed patients/postop examination stable for co-management with referring 

doctor.





EBL


EBL:  None





SPECIMANS COLLECTED


Specimens Collected:  None











JANIYA RAMIREZ MD             Oct 14, 2021 08:28

## 2021-10-28 ENCOUNTER — HOSPITAL ENCOUNTER (OUTPATIENT)
Dept: HOSPITAL 63 - SURG | Age: 41
Discharge: HOME | End: 2021-10-28
Attending: OPHTHALMOLOGY
Payer: COMMERCIAL

## 2021-10-28 VITALS — DIASTOLIC BLOOD PRESSURE: 82 MMHG | SYSTOLIC BLOOD PRESSURE: 167 MMHG

## 2021-10-28 DIAGNOSIS — H25.11: Primary | ICD-10-CM

## 2021-10-28 LAB — U PREG PATIENT: NEGATIVE

## 2021-10-28 PROCEDURE — V2632 POST CHMBR INTRAOCULAR LENS: HCPCS

## 2021-10-28 PROCEDURE — 87426 SARSCOV CORONAVIRUS AG IA: CPT

## 2021-10-28 PROCEDURE — 82947 ASSAY GLUCOSE BLOOD QUANT: CPT

## 2021-10-28 PROCEDURE — 81025 URINE PREGNANCY TEST: CPT

## 2021-10-28 PROCEDURE — 66984 XCAPSL CTRC RMVL W/O ECP: CPT

## 2021-10-28 RX ADMIN — KETOROLAC TROMETHAMINE SCH DROP: 5 SOLUTION OPHTHALMIC at 07:10

## 2021-10-28 RX ADMIN — TROPICAMIDE SCH DROP: 10 SOLUTION/ DROPS OPHTHALMIC at 07:10

## 2021-10-28 RX ADMIN — TROPICAMIDE SCH DROP: 10 SOLUTION/ DROPS OPHTHALMIC at 06:58

## 2021-10-28 RX ADMIN — TOBRAMYCIN SCH DROP: 3 SOLUTION OPHTHALMIC at 06:59

## 2021-10-28 RX ADMIN — KETOROLAC TROMETHAMINE SCH DROP: 5 SOLUTION OPHTHALMIC at 06:59

## 2021-10-28 RX ADMIN — PHENYLEPHRINE HYDROCHLORIDE SCH DROP: 25 SOLUTION/ DROPS OPHTHALMIC at 07:10

## 2021-10-28 RX ADMIN — PHENYLEPHRINE HYDROCHLORIDE SCH DROP: 25 SOLUTION/ DROPS OPHTHALMIC at 07:12

## 2021-10-28 RX ADMIN — PHENYLEPHRINE HYDROCHLORIDE SCH DROP: 25 SOLUTION/ DROPS OPHTHALMIC at 06:58

## 2021-10-28 RX ADMIN — TOBRAMYCIN SCH DROP: 3 SOLUTION OPHTHALMIC at 07:10

## 2021-10-28 RX ADMIN — TROPICAMIDE SCH DROP: 10 SOLUTION/ DROPS OPHTHALMIC at 07:12

## 2021-10-28 NOTE — PDOC4
SURGEON:


Janiya Ramirez MD


Date of Procedure:   10/28/21





PREOP Diagnosis


Visually significant cataract:  Right Eye OD





POSTOP Diagnosis


Same





PROCEDURE:


Phaco w/ posterior chamber IOL:  Right Eye OD





ANESTHESIA


                  








 Deep forniceal periocular 2% Lidocaine jelly














 Olamide/retro bulbar block with 2% Lidocaine with 0.5% Marcaine











DESCRIPTION OF PROCEDURE


The risks, benefits, and alternatives were discussed with the patient who 

elected to proceed.  Informed consent was obtained in writing and placed in the 

chart  After anesthetizing the eye topically, the patient was taken to the 

operating room, and the operative eye was prepped and draped in the usual 

sterile fashion for ocular surgery.  A wire lid speculum was placed.





A 1-mm clear corneal paracentesis incision was created with the side-port blade 

at a position three o'clock hours clockwise from the temporal cornea.  Then, 1% 

non-preserved Lidocaine with epinephrine was injected into the anterior chamber 

followed by viscoelastic.  Cotton-tipped applicators were used to stabilize the 

globe, and a 2.4 mm keratome was used to create a self-sealing incision in clear

cornea at the temporal limbus.  The Utrata forceps were used to create a 

continuous curvilinear capsulorrhexis.  Balanced saline solution was injected 

via cannula beneath the capsulorrhexis edge to hydrodissect the lens nucleus and

cortex from the lens capsule.  The phacoemulsification handpiece and a chopping 

instrument were then used to remove the lens nucleus.  The remaining epinuclear 

material and cortex were removed with the irrigation/aspiration handpiece.  

Viscoelastic was used to re-inflate the lens capsule, and the intraocular lens 

was injected directly into the capsular bag.  The corneal wound edges were 

hydrated with balanced salt solution on a cannula and the irrigation/aspiration


handpiece was used to extract the remaining viscoelastic.   Cefuroxime 0.1mg/ml 

/ Vigamox 0.5% was injected into the anterior chamber intracamerally.  The 

wounds were inspected and found to be watertight at an appropriate intraocular 

pressure.  Topical antibiotic drops were placed on the corneal surface.


LRI:  No


                               


If Yes,  Number []       Axis []         Length [] degrees         Depth [] 

microns


Incision Axis:  180


               


Toric Lens Axis []


Patch/shield with Maxitrol/Tobradex/Erythromycin ointment: 








 Yes














 No 








                            


                                 


Co-managed patients/postop examination stable for co-management with referring 

doctor.





EBL


EBL:  None





SPECIMANS COLLECTED


Specimens Collected:  None











JANIYA RAMIREZ MD             Oct 28, 2021 07:52

## 2022-04-24 ENCOUNTER — HOSPITAL ENCOUNTER (EMERGENCY)
Dept: HOSPITAL 63 - ER | Age: 42
Discharge: ANOTHER HOSPITAL | End: 2022-04-24
Payer: COMMERCIAL

## 2022-04-24 ENCOUNTER — HOSPITAL ENCOUNTER (INPATIENT)
Dept: HOSPITAL 61 - 1 WEST ICU | Age: 42
LOS: 18 days | Discharge: NURSING, REHAB OR LONG-TERM CARE | DRG: 64 | End: 2022-05-12
Payer: COMMERCIAL

## 2022-04-24 VITALS — BODY MASS INDEX: 55.32 KG/M2 | WEIGHT: 293 LBS | HEIGHT: 61 IN

## 2022-04-24 VITALS — SYSTOLIC BLOOD PRESSURE: 186 MMHG | DIASTOLIC BLOOD PRESSURE: 83 MMHG

## 2022-04-24 VITALS — DIASTOLIC BLOOD PRESSURE: 87 MMHG | SYSTOLIC BLOOD PRESSURE: 179 MMHG

## 2022-04-24 VITALS — DIASTOLIC BLOOD PRESSURE: 85 MMHG | SYSTOLIC BLOOD PRESSURE: 192 MMHG

## 2022-04-24 VITALS — DIASTOLIC BLOOD PRESSURE: 86 MMHG | SYSTOLIC BLOOD PRESSURE: 170 MMHG

## 2022-04-24 VITALS — SYSTOLIC BLOOD PRESSURE: 183 MMHG | DIASTOLIC BLOOD PRESSURE: 84 MMHG

## 2022-04-24 VITALS — DIASTOLIC BLOOD PRESSURE: 81 MMHG | SYSTOLIC BLOOD PRESSURE: 166 MMHG

## 2022-04-24 VITALS — SYSTOLIC BLOOD PRESSURE: 183 MMHG | DIASTOLIC BLOOD PRESSURE: 85 MMHG

## 2022-04-24 VITALS — WEIGHT: 293 LBS | HEIGHT: 64 IN | BODY MASS INDEX: 50.02 KG/M2

## 2022-04-24 DIAGNOSIS — T50.8X5A: ICD-10-CM

## 2022-04-24 DIAGNOSIS — J45.909: ICD-10-CM

## 2022-04-24 DIAGNOSIS — Z98.42: ICD-10-CM

## 2022-04-24 DIAGNOSIS — Z20.822: ICD-10-CM

## 2022-04-24 DIAGNOSIS — N14.1: ICD-10-CM

## 2022-04-24 DIAGNOSIS — E66.01: ICD-10-CM

## 2022-04-24 DIAGNOSIS — Z98.41: ICD-10-CM

## 2022-04-24 DIAGNOSIS — Z83.3: ICD-10-CM

## 2022-04-24 DIAGNOSIS — I67.4: ICD-10-CM

## 2022-04-24 DIAGNOSIS — E11.22: ICD-10-CM

## 2022-04-24 DIAGNOSIS — Z63.4: ICD-10-CM

## 2022-04-24 DIAGNOSIS — I16.0: Primary | ICD-10-CM

## 2022-04-24 DIAGNOSIS — E78.5: ICD-10-CM

## 2022-04-24 DIAGNOSIS — N18.9: ICD-10-CM

## 2022-04-24 DIAGNOSIS — R74.8: ICD-10-CM

## 2022-04-24 DIAGNOSIS — Z79.899: ICD-10-CM

## 2022-04-24 DIAGNOSIS — I10: ICD-10-CM

## 2022-04-24 DIAGNOSIS — N17.0: ICD-10-CM

## 2022-04-24 DIAGNOSIS — Z82.49: ICD-10-CM

## 2022-04-24 DIAGNOSIS — M19.90: ICD-10-CM

## 2022-04-24 DIAGNOSIS — N92.6: ICD-10-CM

## 2022-04-24 DIAGNOSIS — Z79.4: ICD-10-CM

## 2022-04-24 DIAGNOSIS — G47.33: ICD-10-CM

## 2022-04-24 DIAGNOSIS — Z91.14: ICD-10-CM

## 2022-04-24 DIAGNOSIS — G93.6: ICD-10-CM

## 2022-04-24 DIAGNOSIS — I12.9: ICD-10-CM

## 2022-04-24 DIAGNOSIS — Z74.01: ICD-10-CM

## 2022-04-24 DIAGNOSIS — I63.29: Primary | ICD-10-CM

## 2022-04-24 DIAGNOSIS — Z91.19: ICD-10-CM

## 2022-04-24 LAB
ALBUMIN SERPL-MCNC: 3 G/DL (ref 3.4–5)
ALBUMIN/GLOB SERPL: 0.7 {RATIO} (ref 1–1.7)
ALP SERPL-CCNC: 223 U/L (ref 46–116)
ALT SERPL-CCNC: 138 U/L (ref 14–59)
AMPHETAMINE/METHAMPHETAMINE: (no result)
ANION GAP SERPL CALC-SCNC: 9 MMOL/L (ref 6–14)
APTT PPP: YELLOW S
AST SERPL-CCNC: 60 U/L (ref 15–37)
BACTERIA #/AREA URNS HPF: 0 /HPF
BARBITURATES UR-MCNC: (no result) UG/ML
BASOPHILS # BLD AUTO: 0.1 X10^3/UL (ref 0–0.2)
BASOPHILS NFR BLD: 1 % (ref 0–3)
BENZODIAZ UR-MCNC: (no result) UG/L
BILIRUB SERPL-MCNC: 0.5 MG/DL (ref 0.2–1)
BUN/CREAT SERPL: 13 (ref 6–20)
CA-I SERPL ISE-MCNC: 23 MG/DL (ref 7–20)
CALCIUM SERPL-MCNC: 9.3 MG/DL (ref 8.5–10.1)
CANNABINOIDS UR-MCNC: (no result) UG/L
CHLORIDE SERPL-SCNC: 100 MMOL/L (ref 98–107)
CO2 SERPL-SCNC: 24 MMOL/L (ref 21–32)
COCAINE UR-MCNC: (no result) NG/ML
CREAT SERPL-MCNC: 1.8 MG/DL (ref 0.6–1)
EOSINOPHIL NFR BLD: 0 % (ref 0–3)
EOSINOPHIL NFR BLD: 0.1 X10^3/UL (ref 0–0.7)
ERYTHROCYTE [DISTWIDTH] IN BLOOD BY AUTOMATED COUNT: 13.3 % (ref 11.5–14.5)
FIBRINOGEN PPP-MCNC: (no result) MG/DL
GFR SERPLBLD BASED ON 1.73 SQ M-ARVRAT: 37.5 ML/MIN
GLOBULIN SER-MCNC: 4.6 G/DL (ref 2.2–3.8)
GLUCOSE SERPL-MCNC: 281 MG/DL (ref 70–99)
GLUCOSE UR STRIP-MCNC: 250 MG/DL
HCT VFR BLD CALC: 37.3 % (ref 36–47)
HGB BLD-MCNC: 11.9 G/DL (ref 12–15.5)
LYMPHOCYTES # BLD: 1.1 X10^3/UL (ref 1–4.8)
LYMPHOCYTES NFR BLD AUTO: 9 % (ref 24–48)
MCH RBC QN AUTO: 28 PG (ref 25–35)
MCHC RBC AUTO-ENTMCNC: 32 G/DL (ref 31–37)
MCV RBC AUTO: 88 FL (ref 79–100)
METHADONE SERPL-MCNC: (no result) NG/ML
MONO #: 0.4 X10^3/UL (ref 0–1.1)
MONOCYTES NFR BLD: 3 % (ref 0–9)
NEUT #: 10.9 X10^3UL (ref 1.8–7.7)
NEUTROPHILS NFR BLD AUTO: 87 % (ref 31–73)
NITRITE UR QL STRIP: (no result)
OPIATES UR-MCNC: (no result) NG/ML
PCP SERPL-MCNC: (no result) MG/DL
PLATELET # BLD AUTO: 405 X10^3/UL (ref 140–400)
POTASSIUM SERPL-SCNC: 4.9 MMOL/L (ref 3.5–5.1)
PROT SERPL-MCNC: 7.6 G/DL (ref 6.4–8.2)
RBC # BLD AUTO: 4.24 X10^6/UL (ref 3.5–5.4)
RBC #/AREA URNS HPF: >40 /HPF (ref 0–2)
SODIUM SERPL-SCNC: 133 MMOL/L (ref 136–145)
SP GR UR STRIP: 1.02
SQUAMOUS #/AREA URNS LPF: (no result) /LPF
UROBILINOGEN UR-MCNC: 0.2 MG/DL
WBC # BLD AUTO: 12.6 X10^3/UL (ref 4–11)
WBC #/AREA URNS HPF: (no result) /HPF (ref 0–4)

## 2022-04-24 PROCEDURE — 96376 TX/PRO/DX INJ SAME DRUG ADON: CPT

## 2022-04-24 PROCEDURE — 93970 EXTREMITY STUDY: CPT

## 2022-04-24 PROCEDURE — 81001 URINALYSIS AUTO W/SCOPE: CPT

## 2022-04-24 PROCEDURE — 87340 HEPATITIS B SURFACE AG IA: CPT

## 2022-04-24 PROCEDURE — 36415 COLL VENOUS BLD VENIPUNCTURE: CPT

## 2022-04-24 PROCEDURE — C1892 INTRO/SHEATH,FIXED,PEEL-AWAY: HCPCS

## 2022-04-24 PROCEDURE — C8929 TTE W OR WO FOL WCON,DOPPLER: HCPCS

## 2022-04-24 PROCEDURE — 76770 US EXAM ABDO BACK WALL COMP: CPT

## 2022-04-24 PROCEDURE — 85025 COMPLETE CBC W/AUTO DIFF WBC: CPT

## 2022-04-24 PROCEDURE — 82550 ASSAY OF CK (CPK): CPT

## 2022-04-24 PROCEDURE — 93005 ELECTROCARDIOGRAM TRACING: CPT

## 2022-04-24 PROCEDURE — G0378 HOSPITAL OBSERVATION PER HR: HCPCS

## 2022-04-24 PROCEDURE — 86317 IMMUNOASSAY INFECTIOUS AGENT: CPT

## 2022-04-24 PROCEDURE — 93880 EXTRACRANIAL BILAT STUDY: CPT

## 2022-04-24 PROCEDURE — 93306 TTE W/DOPPLER COMPLETE: CPT

## 2022-04-24 PROCEDURE — 80048 BASIC METABOLIC PNL TOTAL CA: CPT

## 2022-04-24 PROCEDURE — 96365 THER/PROPH/DIAG IV INF INIT: CPT

## 2022-04-24 PROCEDURE — 86706 HEP B SURFACE ANTIBODY: CPT

## 2022-04-24 PROCEDURE — 80061 LIPID PANEL: CPT

## 2022-04-24 PROCEDURE — 99285 EMERGENCY DEPT VISIT HI MDM: CPT

## 2022-04-24 PROCEDURE — 70450 CT HEAD/BRAIN W/O DYE: CPT

## 2022-04-24 PROCEDURE — 80053 COMPREHEN METABOLIC PANEL: CPT

## 2022-04-24 PROCEDURE — 71275 CT ANGIOGRAPHY CHEST: CPT

## 2022-04-24 PROCEDURE — 70551 MRI BRAIN STEM W/O DYE: CPT

## 2022-04-24 PROCEDURE — C9803 HOPD COVID-19 SPEC COLLECT: HCPCS

## 2022-04-24 PROCEDURE — 96361 HYDRATE IV INFUSION ADD-ON: CPT

## 2022-04-24 PROCEDURE — 76937 US GUIDE VASCULAR ACCESS: CPT

## 2022-04-24 PROCEDURE — 82962 GLUCOSE BLOOD TEST: CPT

## 2022-04-24 PROCEDURE — 71045 X-RAY EXAM CHEST 1 VIEW: CPT

## 2022-04-24 PROCEDURE — 96375 TX/PRO/DX INJ NEW DRUG ADDON: CPT

## 2022-04-24 PROCEDURE — 85651 RBC SED RATE NONAUTOMATED: CPT

## 2022-04-24 PROCEDURE — U0003 INFECTIOUS AGENT DETECTION BY NUCLEIC ACID (DNA OR RNA); SEVERE ACUTE RESPIRATORY SYNDROME CORONAVIRUS 2 (SARS-COV-2) (CORONAVIRUS DISEASE [COVID-19]), AMPLIFIED PROBE TECHNIQUE, MAKING USE OF HIGH THROUGHPUT TECHNOLOGIES AS DESCRIBED BY CMS-2020-01-R: HCPCS

## 2022-04-24 PROCEDURE — 87426 SARSCOV CORONAVIRUS AG IA: CPT

## 2022-04-24 PROCEDURE — 36556 INSERT NON-TUNNEL CV CATH: CPT

## 2022-04-24 PROCEDURE — 85027 COMPLETE CBC AUTOMATED: CPT

## 2022-04-24 PROCEDURE — 85018 HEMOGLOBIN: CPT

## 2022-04-24 PROCEDURE — 84156 ASSAY OF PROTEIN URINE: CPT

## 2022-04-24 PROCEDURE — 80307 DRUG TEST PRSMV CHEM ANLYZR: CPT

## 2022-04-24 PROCEDURE — 96366 THER/PROPH/DIAG IV INF ADDON: CPT

## 2022-04-24 PROCEDURE — 82570 ASSAY OF URINE CREATININE: CPT

## 2022-04-24 PROCEDURE — 82947 ASSAY GLUCOSE BLOOD QUANT: CPT

## 2022-04-24 RX ADMIN — METOPROLOL TARTRATE ONE MG: 5 INJECTION INTRAVENOUS at 16:07

## 2022-04-24 RX ADMIN — SODIUM CHLORIDE ONE UNIT: 900 INJECTION, SOLUTION INTRAVENOUS at 16:44

## 2022-04-24 RX ADMIN — SODIUM CHLORIDE ONE MLS/HR: 0.9 INJECTION, SOLUTION INTRAVENOUS at 11:57

## 2022-04-24 RX ADMIN — ONDANSETRON ONE MG: 2 INJECTION INTRAMUSCULAR; INTRAVENOUS at 11:58

## 2022-04-24 RX ADMIN — SODIUM CHLORIDE ONE MLS/HR: 0.9 INJECTION, SOLUTION INTRAVENOUS at 11:30

## 2022-04-24 RX ADMIN — HYDRALAZINE HYDROCHLORIDE ONE MG: 20 INJECTION INTRAMUSCULAR; INTRAVENOUS at 11:58

## 2022-04-24 RX ADMIN — HYDRALAZINE HYDROCHLORIDE ONE MG: 20 INJECTION INTRAMUSCULAR; INTRAVENOUS at 14:12

## 2022-04-24 RX ADMIN — IOHEXOL ONE ML: 350 INJECTION, SOLUTION INTRAVENOUS at 15:32

## 2022-04-24 SDOH — SOCIAL STABILITY - SOCIAL INSECURITY: DISSAPEARANCE AND DEATH OF FAMILY MEMBER: Z63.4

## 2022-04-24 NOTE — EKG
[facility] [address], [place] [address]

Test Date:    2022               Test Time:    11:35:07

Pat Name:     PARK ALEXANDER           Department:   

Patient ID:   [id number]           Room:          

Gender:       F                        Technician:   QASIM

:          1980               Requested By: MARII ALFONSO

Order Number: [id number]            Reading MD:   Darryl Montano

                                 Measurements

Intervals                              Axis          

Rate:         97                       P:            65

CT:           134                      QRS:          63

QRSD:         90                       T:            59

QT:           348                                    

QTc:          446                                    

                           Interpretive Statements

SINUS RHYTHM

Electronically Signed On 2022 17:22:04 CDT by Darryl Montano

## 2022-04-24 NOTE — RAD
EXAMINATION: CTA CHEST



CLINICAL HISTORY: Tachycardia and shortness of breath.



Technique: Spiral CT acquisition of the chest from the thoracic inlet to the upper abdomen following 
IV contrast with coronal and sagittal reformatted images also provided for review. 3D maximum intensi
ty projection images also performed.



CT Dose Reduction Employed: One or more of the following individualized dose reduction techniques wer
e utilized for this examination:  1. Automated exposure control  2. Adjustment of the mA and/or kV ac
cording to patient size  3. Use of iterative reconstruction technique.



COMPARISON: None 





FINDINGS:



Limitations: Evaluation limited by nonideal pulmonary arterial enhancement, respiratory motion, and d
ecreased image quality secondary to patient's body habitus.



Pulmonary Vasculature: No evidence of main, lobar, or definite proximal segmental pulmonary arterial 
thrombus.

     

Lung Parenchyma, Pleura, and Airways: Pulmonary hypoexpansion with nonspecific diffuse groundglass at
tenuation and minimal patchy groundglass opacities in the right greater than left upper lobes. Mild d
ependent right basilar subsegmental atelectasis. 5 mm solid pulmonary nodule right middle lobe (serie
s 5 image 67). No pleural effusion. Central airways patent with mild secretions in the dependent trac
hea. 



Lower Neck, Lymph Nodes, and Mediastinum: Visualized thyroid gland within normal limits. No mediastin
al, hilar, or axillary lymphadenopathy. 



Heart, Pericardium, and Thoracic Vessels: Cardiomegaly. No pericardial effusion. Thoracic aorta withi
n normal limits. No coronary artery atherosclerotic calcifications are noted, although the study is n
ot optimized for coronary assessment. 



Bones and Soft Tissues: No evidence of acute osseous abnormality.



Upper Abdomen: Partially visualized upper abdomen unremarkable.



 

IMPRESSION:



No evidence of main, lobar, or definite proximal segmental pulmonary embolism on limited evaluation a
s described.



Pulmonary hypoexpansion with minimal nonspecific patchy groundglass opacities in the upper lobes, gre
ater on the right.



Cardiomegaly.



5 mm pulmonary nodule right middle lobe, consider optional follow-up CT chest without intravenous con
trast in 12 months if patient is high-risk for malignancy.



Electronically signed by: Hoang Mi DO (4/24/2022 4:03 PM) [facility]

## 2022-04-24 NOTE — NUR
Patient admitted to room 107 from [facility], ICU monitors placed. Patient main complaint is 
facial numbness and generalized weakness. Cardene gtt currently running. I spoke with Dr. Chao for admit orders and noted that a head CT was not done. Dr. Chao ordered head CT and 
neuro consult. Goal for cardene gtt is to have MAP be around 90. Patient taken to head CT, 
tolerated well. Patient now resting in bed with no further complaints.

## 2022-04-24 NOTE — RAD
STUDY: CT head without contrast



INDICATION: Face numbness. Generalized weakness.



COMPARISON: None.



TECHNIQUE: Axial CT imaging through the head without the use of intravenous contrast. Sagittal and co
cuong reformats were obtained.



One or more of the following individualized dose reduction techniques were utilized for this examinat
ion:  



1. Automated exposure control

2. Adjustment of the mA and/or kV according to patient size

3. Use of iterative reconstruction technique.



FINDINGS:



Partially degraded evaluation of the lower posterior and middle cranial fossae secondary to patient b
[place]ia habitus with beam attenuation.



No acute intracranial hemorrhage. Gray-white matter differentiation is maintained. No localized mass 
effect, midline shift or hydrocephalus. There is some patchy low-attenuation of the subcortical white
 matter seemingly more pronounced involving the right cerebral hemisphere. Dense dural calcification 
along the falx anteriorly.



No acute calvarial abnormality. Well aerated mastoid air cells. Probable maxillary sinus mucosal rete
ntion cyst on the right. No layering fluid within the visualized paranasal sinuses.



IMPRESSION:



1.  No acute intracranial abnormality by CT.

2.  There are findings involving the subcortical white matter which are most commonly seen in the set
ting of chronic microvascular ischemic change. If there is further clinical concern for an acute intr
acranial process MRI could be considered as no comparison studies are available for review.



Electronically signed by: TONY FRIAS MD (4/24/2022 11:27 PM) [facility]

## 2022-04-24 NOTE — PHYS DOC
General Adult


EDM:


Chief Complaint:  HYPERTENSION





HPI:


HPI:


41-year-old female presents with nausea, vomiting.  She has been having nausea 

for several days.  She has vomited a few times.  She has been trying 

over-the-counter medications with some relief.  As long as she is lying down she

feels okay.  When she gets up moves around she gets nauseated.  She had an 

episode of dizziness when she stood up quickly.  She has had no further 

episodes.  She is a diabetic and has not taken her insulin for a couple of days 

because of her dizziness.  She does not believe she has had a fever.  Denies 

diarrhea, trauma, or falls.





Review of Systems:


Review of Systems:


Constitutional:  Denies fever or chills 


Eyes:  Denies change in visual acuity 


HENT:  Denies nasal congestion or sore throat 


Respiratory:  Denies cough or shortness of breath 


Cardiovascular:  Denies chest pain or edema 


GI:  nausea, vomiting,. Denies abdominal pain, bloody stools or diarrhea 


: Denies dysuria 


Musculoskeletal:  Denies back pain or joint pain 


Integument:  Denies rash 


Neurologic:  Denies headache, focal weakness or sensory changes 


Endocrine:  Denies polyuria or polydipsia 


Lymphatic:  Denies swollen glands 


Psychiatric:  Denies depression or anxiety





Current Medications:


Current Meds:





Current Medications








 Medications


  (Trade)  Dose


 Ordered  Sig/Kee  Start Time


 Stop Time Status Last Admin


Dose Admin


 


 Ondansetron HCl


  (Zofran)  4 mg  1X  ONCE  4/24/22 11:30


 4/24/22 11:31 UNV  





 


 Sodium Chloride  1,000 ml @ 


 1,000 mls/hr  1X  ONCE  4/24/22 11:30


 4/24/22 12:29 UNV  














Allergies:


Allergies:





Allergies








Coded Allergies Type Severity Reaction Last Updated Verified


 


  No Known Drug Allergies    10/26/21 No











Physical Exam:


PE:





Constitutional: Well developed, well nourished, morbidly obese, no acute 

distress, non-toxic appearance. []


HENT: Normocephalic, atraumatic, bilateral external ears normal, oropharynx 

moist, no oral exudates, nose normal. []


Eyes: PERRLA, EOMI, conjunctiva normal, no discharge. [] 


Neck: Normal range of motion, no tenderness, supple, no stridor. [] 


Cardiovascular: Heart rate regular rhythm, no murmur []


Lungs & Thorax:  Bilateral breath sounds clear to auscultation []


Abdomen: Bowel sounds normal, soft, no tenderness, no masses, no pulsatile 

masses. [] 


Skin: Warm, dry, no erythema, no rash. [] 


Back: No tenderness, no CVA tenderness. [] 


Extremities: No tenderness, no cyanosis, no clubbing, ROM intact, no edema. [] 


Neurologic: Alert and oriented X 3, normal motor function, normal sensory 

function, no focal deficits noted. []


Psychologic: Affect normal, judgement normal, mood normal. []





EKG:


EKG:


Sinus rhythm, rate 97, normal axis, no ST elevation or depression.  []





Radiology/Procedures:


Radiology/Procedures:


[]


Impressions:


EXAMINATION: CTA CHEST





CLINICAL HISTORY: Tachycardia and shortness of breath.





Technique: Spiral CT acquisition of the chest from the thoracic inlet to the 

upper abdomen following IV contrast with coronal and sagittal reformatted images

 also provided for review. 3D maximum intensity projection images also perf

ormed.





CT Dose Reduction Employed: One or more of the following individualized dose 

reduction techniques were utilized for this examination:  1. Automated exposure 

control  2. Adjustment of the mA and/or kV according to patient size  3. Use of 

iterative reconstruction technique.





COMPARISON: None 








FINDINGS:





Limitations: Evaluation limited by nonideal pulmonary arterial enhancement, 

respiratory motion, and decreased image quality secondary to patient's body 

habitus.





Pulmonary Vasculature: No evidence of main, lobar, or definite proximal 

segmental pulmonary arterial thrombus.


     


Lung Parenchyma, Pleura, and Airways: Pulmonary hypoexpansion with nonspecific 

diffuse groundglass attenuation and minimal patchy groundglass opacities in the 

right greater than left upper lobes. Mild dependent right basilar subsegmental 

atelectasis. 5 mm solid pulmonary nodule right middle lobe (series 5 image 67). 

No pleural effusion. Central airways patent with mild secretions in the 

dependent trachea. 





Lower Neck, Lymph Nodes, and Mediastinum: Visualized thyroid gland within normal

 limits. No mediastinal, hilar, or axillary lymphadenopathy. 





Heart, Pericardium, and Thoracic Vessels: Cardiomegaly. No pericardial effusion.

 Thoracic aorta within normal limits. No coronary artery atherosclerotic 

calcifications are noted, although the study is not optimized for coronary 

assessment. 





Bones and Soft Tissues: No evidence of acute osseous abnormality.





Upper Abdomen: Partially visualized upper abdomen unremarkable.





 


IMPRESSION:





No evidence of main, lobar, or definite proximal segmental pulmonary embolism on

 limited evaluation as described.





Pulmonary hypoexpansion with minimal nonspecific patchy groundglass opacities in

 the upper lobes, greater on the right.





Cardiomegaly.





5 mm pulmonary nodule right middle lobe, consider optional follow-up CT chest 

without intravenous contrast in 12 months if patient is high-risk for 

malignancy.





Electronically signed by: Hoang Dillard DO (4/24/2022 4:03 PM) [facility]














DICTATED AND SIGNED BY:     HOANG DILLARD DO


DATE:     04/24/22 1550





CC: MARII ALFONSO DO; GLORIA NOE ~





Heart Score:


C/O Chest Pain:  N/A


Risk Factors:


Risk Factors:  DM, Current or recent (<one month) smoker, HTN, HLP, family 

history of CAD, obesity.


Risk Scores:


Score 0 - 3:  2.5% MACE over next 6 weeks - Discharge Home


Score 4 - 6:  20.3% MACE over next 6 weeks - Admit for Clinical Observation


Score 7 - 10:  72.7% MACE over next 6 weeks - Early Invasive Strategies





Course & Med Decision Making:


Course & Med Decision Making


Pertinent Labs and Imaging studies reviewed. (See chart for details)


The patient has an elevated creatinine of 1.8.  Have given her 2 L normal 

saline.  I have no previous for comparison.  She has elevated liver enzymes.  

She has elevated blood sugar with a normal anion gap.  See labs for further 

details.  The patient has had extremely resistant hypertension.  I have given 

her 0.2 of clonidine, 20 of hydralazine, 10 mg of metoprolol IV and she took her

 home losartan.  Her blood pressure has minimally improved to just under 200 

systolic.  She is also feeling borderline tachycardic to mildly tachycardic.  I 

ordered a CT angiogram of the chest and it shows groundglass opacities but no 

pulmonary embolism.  I will get a place the patient on a nicardipine drip and 

transfer her to [facility].  I talked with the hospitalist, Dr. Landon and he has accepted the patient for transfer.  I updated him at [id number].


[]





Dragon Disclaimer:


Dragon Disclaimer:


This electronic medical record was generated, in whole or in part, using a voice

 recognition dictation system.





Departure


Departure:


Impression:  


   Primary Impression:  


   Hypertensive urgency


Disposition:  [facility]


Admitting Physician:  Bird Landon


Condition:  STABLE


Referrals:  


GLORIA NOE (PCP)











MARII ALFONSO DO                 Apr 24, 2022 11:21

## 2022-04-25 VITALS — DIASTOLIC BLOOD PRESSURE: 81 MMHG | SYSTOLIC BLOOD PRESSURE: 168 MMHG

## 2022-04-25 VITALS — SYSTOLIC BLOOD PRESSURE: 157 MMHG | DIASTOLIC BLOOD PRESSURE: 74 MMHG

## 2022-04-25 VITALS — SYSTOLIC BLOOD PRESSURE: 217 MMHG | DIASTOLIC BLOOD PRESSURE: 71 MMHG

## 2022-04-25 VITALS — SYSTOLIC BLOOD PRESSURE: 174 MMHG | DIASTOLIC BLOOD PRESSURE: 73 MMHG

## 2022-04-25 VITALS — DIASTOLIC BLOOD PRESSURE: 70 MMHG | SYSTOLIC BLOOD PRESSURE: 141 MMHG

## 2022-04-25 VITALS — SYSTOLIC BLOOD PRESSURE: 153 MMHG | DIASTOLIC BLOOD PRESSURE: 75 MMHG

## 2022-04-25 VITALS — DIASTOLIC BLOOD PRESSURE: 78 MMHG | SYSTOLIC BLOOD PRESSURE: 179 MMHG

## 2022-04-25 VITALS — SYSTOLIC BLOOD PRESSURE: 167 MMHG | DIASTOLIC BLOOD PRESSURE: 81 MMHG

## 2022-04-25 VITALS — SYSTOLIC BLOOD PRESSURE: 173 MMHG | DIASTOLIC BLOOD PRESSURE: 75 MMHG

## 2022-04-25 VITALS — SYSTOLIC BLOOD PRESSURE: 148 MMHG | DIASTOLIC BLOOD PRESSURE: 73 MMHG

## 2022-04-25 VITALS — DIASTOLIC BLOOD PRESSURE: 78 MMHG | SYSTOLIC BLOOD PRESSURE: 153 MMHG

## 2022-04-25 VITALS — DIASTOLIC BLOOD PRESSURE: 78 MMHG | SYSTOLIC BLOOD PRESSURE: 174 MMHG

## 2022-04-25 VITALS — SYSTOLIC BLOOD PRESSURE: 155 MMHG | DIASTOLIC BLOOD PRESSURE: 76 MMHG

## 2022-04-25 VITALS — DIASTOLIC BLOOD PRESSURE: 78 MMHG | SYSTOLIC BLOOD PRESSURE: 150 MMHG

## 2022-04-25 VITALS — SYSTOLIC BLOOD PRESSURE: 161 MMHG | DIASTOLIC BLOOD PRESSURE: 76 MMHG

## 2022-04-25 VITALS — SYSTOLIC BLOOD PRESSURE: 215 MMHG | DIASTOLIC BLOOD PRESSURE: 81 MMHG

## 2022-04-25 VITALS — DIASTOLIC BLOOD PRESSURE: 65 MMHG | SYSTOLIC BLOOD PRESSURE: 154 MMHG

## 2022-04-25 VITALS — SYSTOLIC BLOOD PRESSURE: 157 MMHG | DIASTOLIC BLOOD PRESSURE: 77 MMHG

## 2022-04-25 VITALS — DIASTOLIC BLOOD PRESSURE: 74 MMHG | SYSTOLIC BLOOD PRESSURE: 140 MMHG

## 2022-04-25 VITALS — SYSTOLIC BLOOD PRESSURE: 164 MMHG | DIASTOLIC BLOOD PRESSURE: 78 MMHG

## 2022-04-25 VITALS — DIASTOLIC BLOOD PRESSURE: 87 MMHG | SYSTOLIC BLOOD PRESSURE: 175 MMHG

## 2022-04-25 VITALS — SYSTOLIC BLOOD PRESSURE: 127 MMHG | DIASTOLIC BLOOD PRESSURE: 73 MMHG

## 2022-04-25 VITALS — DIASTOLIC BLOOD PRESSURE: 70 MMHG | SYSTOLIC BLOOD PRESSURE: 145 MMHG

## 2022-04-25 VITALS — DIASTOLIC BLOOD PRESSURE: 71 MMHG | SYSTOLIC BLOOD PRESSURE: 162 MMHG

## 2022-04-25 VITALS — DIASTOLIC BLOOD PRESSURE: 86 MMHG | SYSTOLIC BLOOD PRESSURE: 179 MMHG

## 2022-04-25 VITALS — DIASTOLIC BLOOD PRESSURE: 70 MMHG | SYSTOLIC BLOOD PRESSURE: 156 MMHG

## 2022-04-25 VITALS — DIASTOLIC BLOOD PRESSURE: 79 MMHG | SYSTOLIC BLOOD PRESSURE: 168 MMHG

## 2022-04-25 VITALS — SYSTOLIC BLOOD PRESSURE: 166 MMHG | DIASTOLIC BLOOD PRESSURE: 82 MMHG

## 2022-04-25 VITALS — DIASTOLIC BLOOD PRESSURE: 62 MMHG | SYSTOLIC BLOOD PRESSURE: 131 MMHG

## 2022-04-25 VITALS — SYSTOLIC BLOOD PRESSURE: 164 MMHG | DIASTOLIC BLOOD PRESSURE: 89 MMHG

## 2022-04-25 VITALS — DIASTOLIC BLOOD PRESSURE: 65 MMHG | SYSTOLIC BLOOD PRESSURE: 143 MMHG

## 2022-04-25 VITALS — SYSTOLIC BLOOD PRESSURE: 146 MMHG | DIASTOLIC BLOOD PRESSURE: 71 MMHG

## 2022-04-25 VITALS — SYSTOLIC BLOOD PRESSURE: 160 MMHG | DIASTOLIC BLOOD PRESSURE: 74 MMHG

## 2022-04-25 VITALS — DIASTOLIC BLOOD PRESSURE: 61 MMHG | SYSTOLIC BLOOD PRESSURE: 147 MMHG

## 2022-04-25 VITALS — SYSTOLIC BLOOD PRESSURE: 172 MMHG | DIASTOLIC BLOOD PRESSURE: 80 MMHG

## 2022-04-25 LAB
ALBUMIN SERPL-MCNC: 2.2 G/DL (ref 3.4–5)
ALBUMIN/GLOB SERPL: 0.5 {RATIO} (ref 1–1.7)
ALP SERPL-CCNC: 151 U/L (ref 46–116)
ALT SERPL-CCNC: 130 U/L (ref 14–59)
ANION GAP SERPL CALC-SCNC: 11 MMOL/L (ref 6–14)
AST SERPL-CCNC: 52 U/L (ref 15–37)
BASOPHILS # BLD AUTO: 0.1 X10^3/UL (ref 0–0.2)
BASOPHILS NFR BLD: 1 % (ref 0–3)
BILIRUB SERPL-MCNC: 0.2 MG/DL (ref 0.2–1)
BUN SERPL-MCNC: 27 MG/DL (ref 7–20)
BUN/CREAT SERPL: 12 (ref 6–20)
CALCIUM SERPL-MCNC: 8.2 MG/DL (ref 8.5–10.1)
CHLORIDE SERPL-SCNC: 103 MMOL/L (ref 98–107)
CHOLEST SERPL-MCNC: 188 MG/DL (ref 0–200)
CHOLEST/HDLC SERPL: 3.6 {RATIO}
CO2 SERPL-SCNC: 22 MMOL/L (ref 21–32)
CREAT SERPL-MCNC: 2.3 MG/DL (ref 0.6–1)
EOSINOPHIL NFR BLD: 0.1 X10^3/UL (ref 0–0.7)
EOSINOPHIL NFR BLD: 1 % (ref 0–3)
ERYTHROCYTE [DISTWIDTH] IN BLOOD BY AUTOMATED COUNT: 13.4 % (ref 11.5–14.5)
GFR SERPLBLD BASED ON 1.73 SQ M-ARVRAT: 23.4 ML/MIN
GLUCOSE SERPL-MCNC: 307 MG/DL (ref 70–99)
HCT VFR BLD CALC: 31.4 % (ref 36–47)
HDLC SERPL-MCNC: 52 MG/DL (ref 40–60)
HGB BLD-MCNC: 10 G/DL (ref 12–15.5)
LDLC: 115 MG/DL (ref 0–100)
LYMPHOCYTES # BLD: 1.8 X10^3/UL (ref 1–4.8)
LYMPHOCYTES NFR BLD AUTO: 14 % (ref 24–48)
MCH RBC QN AUTO: 28 PG (ref 25–35)
MCHC RBC AUTO-ENTMCNC: 32 G/DL (ref 31–37)
MCV RBC AUTO: 86 FL (ref 79–100)
MONO #: 0.7 X10^3/UL (ref 0–1.1)
MONOCYTES NFR BLD: 5 % (ref 0–9)
NEUT #: 10.3 X10^3/UL (ref 1.8–7.7)
NEUTROPHILS NFR BLD AUTO: 80 % (ref 31–73)
PLATELET # BLD AUTO: 348 X10^3/UL (ref 140–400)
POTASSIUM SERPL-SCNC: 4.3 MMOL/L (ref 3.5–5.1)
PROT SERPL-MCNC: 6.5 G/DL (ref 6.4–8.2)
RBC # BLD AUTO: 3.64 X10^6/UL (ref 3.5–5.4)
SODIUM SERPL-SCNC: 136 MMOL/L (ref 136–145)
TRIGL SERPL-MCNC: 107 MG/DL (ref 0–150)
VLDLC: 21 MG/DL (ref 0–40)
WBC # BLD AUTO: 12.9 X10^3/UL (ref 4–11)

## 2022-04-25 RX ADMIN — MONTELUKAST SODIUM SCH MG: 10 TABLET, FILM COATED ORAL at 08:57

## 2022-04-25 RX ADMIN — ASPIRIN SCH MG: 325 TABLET, DELAYED RELEASE ORAL at 20:18

## 2022-04-25 RX ADMIN — LOSARTAN POTASSIUM SCH MG: 50 TABLET ORAL at 08:56

## 2022-04-25 RX ADMIN — INSULIN LISPRO SCH UNITS: 100 INJECTION, SOLUTION INTRAVENOUS; SUBCUTANEOUS at 08:55

## 2022-04-25 RX ADMIN — INSULIN GLARGINE SCH UNIT: 100 INJECTION, SOLUTION SUBCUTANEOUS at 20:33

## 2022-04-25 RX ADMIN — BACITRACIN SCH MLS/HR: 5000 INJECTION, POWDER, FOR SOLUTION INTRAMUSCULAR at 20:18

## 2022-04-25 RX ADMIN — ATORVASTATIN CALCIUM SCH MG: 40 TABLET, FILM COATED ORAL at 20:18

## 2022-04-25 RX ADMIN — INSULIN LISPRO SCH UNITS: 100 INJECTION, SOLUTION INTRAVENOUS; SUBCUTANEOUS at 18:30

## 2022-04-25 RX ADMIN — SPIRONOLACTONE SCH MG: 25 TABLET ORAL at 08:56

## 2022-04-25 RX ADMIN — INSULIN LISPRO SCH UNITS: 100 INJECTION, SOLUTION INTRAVENOUS; SUBCUTANEOUS at 13:28

## 2022-04-25 NOTE — RAD
INDICATION: Reason: BILAT LEG PAIN r/o DVT / Spl. Instructions:  / History: 



COMPARISON: None.



TECHNIQUE: Grayscale, color and doppler ultrasound images were obtained of the bilateral lower extrem
ity venous vasculature. Some limitation secondary to overlying structures obscuring.



RIGHT:



No thrombus identified in the common femoral vein, femoral vein, popliteal vein or visualized calf ve
ins.



LEFT:



No thrombus identified in the common femoral vein, femoral vein, popliteal vein or visualized calf ve
ins.



IMPRESSION:



*   No thrombus identified in deep venous system of bilateral lower extremities.



Electronically signed by: Vishnu Benson MD (4/25/2022 8:13 AM) [id number]

## 2022-04-25 NOTE — HP
DATE OF SERVICE: 2022

ADMIT DATE: 2022

HISTORY OF PRESENT ILLNESS:  The patient is a 41-year-old -American 

female patient who presented to the Emergency Room of [facility] with a

complaint of recurrent bouts of nausea, vomiting for several days.  She has 

vomited a few times.  She has been trying over-the-counter medication with some 

relief. As long as she is lying down, she feels okay. When she gets up or moves 

around, she gets nauseated.  She had also an episode of dizziness when she stood

up quickly.  She has no further episodes.  She is diabetic and has not taken her

insulin for a couple of days and has not taken her blood pressure for 1 day.  

She does not believe that she has a fever.  She denied any diarrhea, trauma or 

fall.  She was evaluated in the Emergency Room and was found to have extremely 

high blood pressure.  An attempt was made to treat her blood pressure with 

multiple medications including 0.2 mg of clonidine, 20 mg of hydralazine, 10 mg 

of metoprolol without much change, blood pressure has minimally improved to just

under 200.  She was started on nicardipine drip and was transferred to 

[facility] ICU to continue with the Cardizem drip and adjust her 

medications.  On questioning her, she denied any chest pain or shortness of 

breath.



PAST MEDICAL HISTORY:  Significant for hypertension, hyperlipidemia, bronchial 

asthma.



PAST SURGICAL HISTORY:  Significant for bilateral cataract extraction and some 

form of cervical surgery.



ALLERGIES:  She has no known drug allergies.



MEDICATIONS:  She is currently on losartan potassium 100 mg once a day, 

Singulair 10 mg once a day, Humalog and Lantus insulin.  She was also on 

Symbicort and albuterol inhaler. In 2021, she underwent bilateral cataract 

extraction.  At that time, Dr. Julien started her on amlodipine and 

hydrochlorothiazide that she took them for a while and she decided to stop them 

in her own and according to her, blood pressure has been consistently high 

throughout all this time.



FAMILY HISTORY:  She has 1 sister, still alive, and has diabetes and 

hypertension.  Both parents are .  She does not know the age and the 

cause of death of her father.  Her mother  at age of 50 as she was shot.



SOCIAL HISTORY:  She is single, has no children.  Does not smoke. Drinks alcohol

very occasionally.  Does not use any drugs.  She works as a banker.



REVIEW OF SYSTEMS:  On arrival to the Emergency Room of [facility], she

denied any blurring of vision.  She has bilateral cataract extraction, but 

denied any glaucoma or macular degeneration.  Denied any earache, tinnitus or 

sensorineural deafness.  Denied any nosebleed, stuffy nose, or postnasal drip.  

Denied any sore throat, sore tongue, toothache, hoarseness of voice or 

difficulty swallowing.  Did complain of nausea, vomiting.  Denied any diarrhea, 

denied any dysuria, frequency or hematuria.  Denied any chest pain or shortness 

of breath.  Did complain of numbness in her left side of her face and feeling 

dizzy whenever she stands up.



PHYSICAL EXAMINATION:

GENERAL:  On examining her, she apparently looked well and was clearly in no 

apparent respiratory distress.  There was no pallor, jaundice, cyanosis, no 

lymphadenopathy, no thyromegaly, no jugular venous distention.  No limb edema.

VITAL SIGNS:  Her heart rate on arrival was 95, blood pressure went up to 

222/125, her temperature was 97.8, respiratory rate was 20 and oxygen saturation

was 95%.

HEAD, EYES, EARS, NOSE, AND THROAT:  Normocephalic, atraumatic.

NECK:  Supple.

HEART:  Showed normal first and second heart sounds.  No gallop, rub or murmur.

CHEST:  Clear to auscultation, no crepitation or rhonchi.

ABDOMEN:  Distended, soft, nontender.

NEUROLOGIC:  She was grossly intact.  She did complain of numbness in her left 

side of the face without any obvious motor deficit.



LABORATORY DATA:  Her lab work on arrival showed a white cell count 12.6, 

hemoglobin was 11.9, hematocrit 37, MCV 88 and platelet count of 105,000.  Her 

chemistry showed a serum sodium 133, potassium 4.9, chloride 100, bicarbonate 

24, anion gap of 9, BUN 23, creatinine 1.8.  Estimated GFR was 37 mL per minute.

 Her glucose was 181, calcium was 9.3.  Total bilirubin 0.5.  AST, ALT, alkaline

phosphatase are all elevated.  Total protein 7.6, albumin 3.  Urinalysis was 

essentially unremarkable.  Her toxic screen was negative.  Her coronavirus by 

rapid antigen testing was negative.  She had a CT angio of the chest, which 

basically showed no evidence of main lobar or definite proximal segmental 

pulmonary embolism on limited evaluation, pulmonary hypoexpansion with minimal 

nonspecific patchy ground glass opacities in the upper lobes, greater on the 

right, cardiomegaly, 5 mm pulmonary nodule, right middle lobe.



ASSESSMENT AND PLAN:  The patient was treated with multiple antihypertensive 

medications including clonidine, hydralazine as well as 10 mg of metoprolol 

without improvement.  Her systolic pressure continued to be high at around 200 

and therefore, the patient was started on a Cardene drip and was transferred to 

[facility] for further evaluation and treatment.  With the final 

diagnosis of hypertensive urgency, she has chronic kidney disease, type 2 

diabetes mellitus.  She has bronchial asthma.  I will consult the nephrologist 

to assist with management.  I continued with the nicardipine drip with a plan to

maintain a mean arterial pressure of about 90 mmHg, but the plan is to start her

on losartan and also start her on Procardia at 30 mg and increase as needed and 

hopefully wean the nicardipine drip slowly and taper it off completely.







AMM/VIS

DR: AMM/nts   DD: 2022 09:21

DT: 2022 10:07   TID: [id number]

## 2022-04-25 NOTE — RAD
US DPLX CAROTID BILAT



History: Reason: hyertension, left face numb / Spl. Instructions:  / History: 



COMPARISON: None



Technique: Duplex sonography of the cervical portion of both carotid arteries was performed. Real-elsie
e grayscale, color flow Doppler, and Doppler spectral waveform analysis is performed.



PQRS Compliance Statement - Stenosis calculations for CT, MR and conventional angiography are based u
otilio measurement of the distal ICA diameter in accordance with the NASCET methodology.  Stenosis calcu
lations for carotid ultrasound studies are derived from validated velocity criteria which are known t
o correlate with the NASCET methodology.



Findings:

Right side:

Peak systolic flow velocity of the CCA is 116 cm/sec.

Peak systolic flow velocity of the ICA is 110 cm/sec.

The ICA/CCA ratio is 0.95.

Peak end diastolic flow velocity of the ICA is 30 cm/sec.

The peak systolic velocity of the ECA is 239 cm/sec.



Mild atheromatous plaque.



Left side:

Peak systolic flow velocity of the CCA is 139 cm/sec.

Peak systolic flow velocity of the ICA is 109 cm/sec.

The ICA/CCA ratio is 0.79.

Peak end diastolic flow velocity of the ICA is 101 cm/sec. 

Peak systolic flow velocity of the ECA is 234 cm/sec.



Mild atheromatous plaque.



Vertebral arteries: Bilateral vertebral arteries demonstrate antegrade flow.



IMPRESSION:

1.  No hemodynamically significant internal carotid artery stenosis.

2.  Mild atheromatous plaque.

3.  Elevated bilateral external carotid artery velocities, may indicate stenosis.



Electronically signed by: Juan Ramon Hsu DO (4/25/2022 10:22 AM) [id number]

## 2022-04-25 NOTE — RAD
EXAMINATION: Magnetic resonance imaging (MRI) of the brain and brainstem without contrast 4/25/2022 2
:43 PM



HISTORY: Hypertension, left facial numbness



TECHNIQUE: Multiplanar multi-weighted MRI of the brain and brainstem was performed without intravenou
s contrast using the general brain protocol.



COMPARISON: CT head 4/24/2022.



FINDINGS:



The scalp and calvarium are normal. The superior sagittal sinus demonstrates normal venous flow.  The
 corpus callosum is normal in shape and signal intensity. The posterior fossa is unremarkable.  The p
ituitary and sella are normal.  The brainstem and craniocervical junction are unremarkable.



There are T2/FLAIR signal hyperintense foci in the periventricular and subcortical white matter most 
suggestive of mild chronic small vessel ischemic changes.



Focal diffusion signal hyperintensity identified involving the left lateral liset extending into the l
eft brachium pontis with associated T2 signal hyperintensity and low ADC signal compatible with acute
/subacute ischemia. The susceptibility weighted sequences reveal no evidence of acute or chronic hemo
rrhage. The ventricles are normal in size and position without evidence of hydrocephalus.



Small mucus retention cyst identified within the right maxillary sinus.  The visualized portions of t
he mastoids are unremarkable. The orbits appear normal.  Normal flow voids are demonstrated in the ca
rotid arteries and basilar artery.



IMPRESSION:



There is a focal area of acute/subacute ischemia involving the left lateral liset extending to the lef
t brachium pontis. There is associated cytotoxic edema without significant mass effect or hemorrhage.




There are T2/FLAIR signal hyperintense foci in the periventricular and subcortical white matter most 
suggestive of mild chronic small vessel ischemic changes.



**********FOR INTERNAL CODING PURPOSES**********



Critical result:



Findings discussed with Lydia, the patient's nurse at 4/25/2022 4:11 PM.



RESULT CODE: (C)  



  







Electronically signed by: Beckie Bhakta MD (4/25/2022 4:12 PM) [id number]

## 2022-04-25 NOTE — PDOC2
NEUROLOGY CONSULT


Date of Service


DOS:


DATE: 22 


TIME: 09:17





Reason for Consult


Reason for Consult:


Stroke symptoms





Referring Physician


Referring Physician:


Dr. Chao





Source


Source:  Chart review, Patient





History of Present Illness


History of Present Illness


The patient is a 41-year-old right-handed female who has had 2 or 3 days of 

nausea, vomiting, dizziness, chest pain, also some left facial numbness.  She 

went to the [facility] emergency department where she was found to be in 

hypertensive crisis and she was transferred here.  She had a CT angiogram of the

chest.  She denies any prior history of stroke, seizure, head injury, or 

migraines.  She is feeling a little bit better this morning but still has the 

numbness in the left face.  She admits to noncompliance with her medications





Past Medical History


Cardiovascular:  HTN


Pulmonary:  Asthma, Other


Musculoskeletal:  Osteoarthritis


Endocrine:  Diabetes





Past Surgical History


Past Surgical History:  Other ("Female surgery.")





Family History


Family History:  No pertinent hx (Father  of homicide)





Social History


Social History


Single, works at a bank, rare alcohol, no tobacco or street drugs





Current Medications


Current Medications





Current Medications


Nicardipine HCl 50 mg/Sodium Chloride 250 ml @  25 mls/hr CONT  PRN IV PER 

PROTOCOL Last administered on 22at 05:20;  Start 22 at 21:45


Nifedipine (Procardia Xl) 30 mg DAILY PO  Last administered on 22at 09:09; 

Start 22 at 09:00


Zolpidem Tartrate (Ambien) 5 mg PRN QHS  PRN PO INSOMNIA;  Start 22 at 

21:45


Montelukast Sodium (Singulair) 10 mg DAILY PO  Last administered on 22at 

08:57;  Start 22 at 09:00


Insulin Human Lispro (HumaLOG) 15 units TIDWMEALS SQ  Last administered on 

22at 08:55;  Start 22 at 08:00


Insulin Glargine (Lantus Syringe) 40 unit QHS SQ ;  Start 22 at 21:00


Losartan Potassium (Cozaar) 100 mg DAILY PO  Last administered on 22at 

08:56;  Start 22 at 09:00


Spironolactone (Aldactone) 50 mg DAILY PO  Last administered on 22at 08:56;

 Start 22 at 09:00


Insulin Glargine (Lantus Syringe) 40 unit 1X  ONCE SQ  Last administered on 

22at 22:21;  Start 22 at 23:00;  Stop 22 at 23:01;  Status DC





Active Scripts


Active


Levemir Flextouch (Insulin Detemir) 100 Unit/1 Ml Insuln.pen 40 Units SQ QHS 30 

Days


Novolog Flexpen (Insulin Aspart) 100 Unit/1 Ml Insuln.pen 15 Units SQ TIDAC 30 

Days


Reported


Tri-Sprintec (Norgestimate-Ethinyl Estradiol) 1 Each Tablet 1 Tab PO DAILY


Spironolactone 50 Mg Tablet 1 Tab PO DAILY


Montelukast Sodium Tablet  ** (Montelukast Sodium) 10 Mg Tablet 10 Mg PO DAILY


Losartan Potassium 100 Mg Tablet 100 Mg PO DAILY





Allergies


Allergies:  


Coded Allergies:  


     No Known Drug Allergies (Unverified , 17)





ROS


Review of System


Negative for fever, chills, weight loss, shortness of breath, chest pain, 

indigestion, hematochezia, melena, and dysuria.  Full 14-point review of systems

is negative.





Physical Exam


Physical Examination


General:


Well-developed, well-nourished black female in no acute distress


HEENT:


Normocephalic andatraumatic. Temporal arteriespulsatile and nontender.


Neck:


Supple without bruit, no meningismus


Musculoskeletal:


Stability:see neurologic. Gait exam:see neurologic. Tone:see 

neurologic.Strength:see neurologic.


Neurological:


Mental Status:intact, orientation, memory, attention span/concentration, 

language, fund of knowledge normal. Cranial Nerves:Pupils equal and reactive to

light, extraocular movements areintact, visual fields are full to 

confrontation.  Patchy hypesthesia in the left cheek. There is no facial 

asymmetry. Vestibulo-ocular reflex is intact. Palate elevates and tongue 

protrudes in midline. All other cranial related problems are negative except as 

mentioned before.Reflexes:2+ and symmetric with flexor plantar responses. 

Motor:5/5 strength with normal tone and bulk. Coordination:Finger-nose finger 

and heel-to-shin testing are normal. Rapid alternating movements and fine finger

movements are intact. Gait:Not tested. Sensory:Normal pinprick, vibration, 

light touch, proprioception.





Vitals


VITALS





Vital Signs








  Date Time  Temp Pulse Resp B/P (MAP) Pulse Ox O2 Delivery O2 Flow Rate FiO2


 


22 09:09  94  162/71    


 


22 07:00   16  95 Room Air  


 


22 04:00 98.8       





 98.8       











Labs


Labs





Laboratory Tests








Test


 22


21:30 22


04:55 22


08:39


 


Glucose (Fingerstick)


 281 mg/dL


(70-99) 


 282 mg/dL


(70-99)


 


White Blood Count


 


 12.9 x10^3/uL


(4.0-11.0) 





 


Red Blood Count


 


 3.64 x10^6/uL


(3.50-5.40) 





 


Hemoglobin


 


 10.0 g/dL


(12.0-15.5) 





 


Hematocrit


 


 31.4 %


(36.0-47.0) 





 


Mean Corpuscular Volume  86 fL ()  


 


Mean Corpuscular Hemoglobin  28 pg (25-35)  


 


Mean Corpuscular Hemoglobin


Concent 


 32 g/dL


(31-37) 





 


Red Cell Distribution Width


 


 13.4 %


(11.5-14.5) 





 


Platelet Count


 


 348 x10^3/uL


(140-400) 





 


Neutrophils (%) (Auto)  80 % (31-73)  


 


Lymphocytes (%) (Auto)  14 % (24-48)  


 


Monocytes (%) (Auto)  5 % (0-9)  


 


Eosinophils (%) (Auto)  1 % (0-3)  


 


Basophils (%) (Auto)  1 % (0-3)  


 


Neutrophils # (Auto)


 


 10.3 x10^3/uL


(1.8-7.7) 





 


Lymphocytes # (Auto)


 


 1.8 x10^3/uL


(1.0-4.8) 





 


Monocytes # (Auto)


 


 0.7 x10^3/uL


(0.0-1.1) 





 


Eosinophils # (Auto)


 


 0.1 x10^3/uL


(0.0-0.7) 





 


Basophils # (Auto)


 


 0.1 x10^3/uL


(0.0-0.2) 





 


Sodium Level


 


 136 mmol/L


(136-145) 





 


Potassium Level


 


 4.3 mmol/L


(3.5-5.1) 





 


Chloride Level


 


 103 mmol/L


() 





 


Carbon Dioxide Level


 


 22 mmol/L


(21-32) 





 


Anion Gap  11 (6-14)  


 


Blood Urea Nitrogen


 


 27 mg/dL


(7-20) 





 


Creatinine


 


 2.3 mg/dL


(0.6-1.0) 





 


Estimated GFR


(Cockcroft-Gault) 


 23.4 


 





 


BUN/Creatinine Ratio  12 (6-20)  


 


Glucose Level


 


 307 mg/dL


(70-99) 





 


Calcium Level


 


 8.2 mg/dL


(8.5-10.1) 





 


Total Bilirubin


 


 0.2 mg/dL


(0.2-1.0) 





 


Aspartate Amino Transf


(AST/SGOT) 


 52 U/L (15-37) 


 





 


Alanine Aminotransferase


(ALT/SGPT) 


 130 U/L


(14-59) 





 


Alkaline Phosphatase


 


 151 U/L


() 





 


Total Protein


 


 6.5 g/dL


(6.4-8.2) 





 


Albumin


 


 2.2 g/dL


(3.4-5.0) 





 


Albumin/Globulin Ratio  0.5 (1.0-1.7)  








Laboratory Tests








Test


 22


21:30 22


04:55 22


08:39


 


Glucose (Fingerstick)


 281 mg/dL


(70-99) 


 282 mg/dL


(70-99)


 


White Blood Count


 


 12.9 x10^3/uL


(4.0-11.0) 





 


Red Blood Count


 


 3.64 x10^6/uL


(3.50-5.40) 





 


Hemoglobin


 


 10.0 g/dL


(12.0-15.5) 





 


Hematocrit


 


 31.4 %


(36.0-47.0) 





 


Mean Corpuscular Volume  86 fL ()  


 


Mean Corpuscular Hemoglobin  28 pg (25-35)  


 


Mean Corpuscular Hemoglobin


Concent 


 32 g/dL


(31-37) 





 


Red Cell Distribution Width


 


 13.4 %


(11.5-14.5) 





 


Platelet Count


 


 348 x10^3/uL


(140-400) 





 


Neutrophils (%) (Auto)  80 % (31-73)  


 


Lymphocytes (%) (Auto)  14 % (24-48)  


 


Monocytes (%) (Auto)  5 % (0-9)  


 


Eosinophils (%) (Auto)  1 % (0-3)  


 


Basophils (%) (Auto)  1 % (0-3)  


 


Neutrophils # (Auto)


 


 10.3 x10^3/uL


(1.8-7.7) 





 


Lymphocytes # (Auto)


 


 1.8 x10^3/uL


(1.0-4.8) 





 


Monocytes # (Auto)


 


 0.7 x10^3/uL


(0.0-1.1) 





 


Eosinophils # (Auto)


 


 0.1 x10^3/uL


(0.0-0.7) 





 


Basophils # (Auto)


 


 0.1 x10^3/uL


(0.0-0.2) 





 


Sodium Level


 


 136 mmol/L


(136-145) 





 


Potassium Level


 


 4.3 mmol/L


(3.5-5.1) 





 


Chloride Level


 


 103 mmol/L


() 





 


Carbon Dioxide Level


 


 22 mmol/L


(21-32) 





 


Anion Gap  11 (6-14)  


 


Blood Urea Nitrogen


 


 27 mg/dL


(7-20) 





 


Creatinine


 


 2.3 mg/dL


(0.6-1.0) 





 


Estimated GFR


(Cockcroft-Gault) 


 23.4 


 





 


BUN/Creatinine Ratio  12 (6-20)  


 


Glucose Level


 


 307 mg/dL


(70-99) 





 


Calcium Level


 


 8.2 mg/dL


(8.5-10.1) 





 


Total Bilirubin


 


 0.2 mg/dL


(0.2-1.0) 





 


Aspartate Amino Transf


(AST/SGOT) 


 52 U/L (15-37) 


 





 


Alanine Aminotransferase


(ALT/SGPT) 


 130 U/L


(14-59) 





 


Alkaline Phosphatase


 


 151 U/L


() 





 


Total Protein


 


 6.5 g/dL


(6.4-8.2) 





 


Albumin


 


 2.2 g/dL


(3.4-5.0) 





 


Albumin/Globulin Ratio  0.5 (1.0-1.7)  











Assessment/Plan


Assessment/Plan


Impression:


Neurologic symptoms due to hypertensive encephalopathy, I doubt that she has had

a stroke.


History of diabetes, hypertension, hyperlipidemia, noncompliant





Recommendations:


MRI of the brain


Hsu study


Carotid Doppler studies


Hold off on full stroke pathway pending on these results.


Treatment of hypertension


Check lipids.





Thank you for letting me help with the patient's care.











ANGÉLICA MOYER MD           2022 09:21

## 2022-04-25 NOTE — PDOC2
CONSULT


Date of Consult


Date of Consult


DATE: 22 


TIME: 14:39





Reason for Consult


Reason for Consult:


MICHELLE





Identification/Chief Complaint


Chief Complaint


I want to eat





Source


Source:  Chart review, Patient





History of Present Illness


Reason for Visit:


Patient  is a 41-year-old  morbidly obese -American female patient who pr

esented to the Emergency Room of [facility] with a complaint of 

recurrent bouts of nausea, vomiting for several days.  She has 


vomited a few times.  She has been trying over-the-counter medication with some 

relief. She had also an episode of dizziness when she stood up quickly.    She 

is diabetic and has not taken her insulin for a couple of days and has not taken

her blood pressure meds for 1 day.  


 She denies  fever/ Chills .  She denies any diarrhea, or abdominal pain. Denies

dysuria/Hematuria  . No Hx of Recurrent UTI's.  Denies Hx of Kidney stones  


Reports she vomited x 2 2 days back . She has been taking Advil every day at 

least 2 Pills every day for past few days for low back pain  .She is on 

Aldactone and Losartan along with other meds. She state she was also  taking 

HCTZ whisch she stopped own her owb=n few months ago. She follows with Nancy Robb([facility]) office, but not seen rcently or No recent labs. She has never been 

told about any Kidney issues/Abnormal Kidney labs etc  


She denies any CP or SOB.  States her wt has gone up gradually, has LE edema 

mild but stable . Currently No specific complaints, states mouth feels little 

dry and she is hungry  





 She was evaluated in the Emergency Room and was found to have extremely high 

blood pressure.  An attempt was made to treat her blood pressure with multiple 

medications including 0.2 mg of clonidine, 20 mg of hydralazine, 10 mg 


of metoprolol  with  blood pressure  minimally improved to just under 200.  She 

was started on nicardipine drip and was transferred to [facility]

ICU





Past Medical History


Past Medical History


Significant for hypertension, hyperlipidemia, bronchial asthma. DM


Cardiovascular:  HTN


Pulmonary:  Asthma, Other


Musculoskeletal:  Osteoarthritis


Endocrine:  Diabetes





Past Surgical History


Past Surgical History





 Significant for bilateral cataract extraction and some form of cervical 

surgery.


Past Surgical History:  Other ("Female surgery.")





Family History


Family History


 She has 1 sister, still alive, and has diabetes and hypertension.  Both parents

are .  She does not know the age and the cause of death of her father.  

Her mother  at age of 50 as she was shot.


Denies any FHX OF CKD or ESRD


Family History:  Diabetes





Social History


Social History


 She is single, has no children.  Does not smoke. Drinks alcohol very 

occasionally.  Does not use any drugs.  She works as a banker.


ALCOHOL:  rare


Drugs:  None


Lives:  Alone





Current Medications


Current Medications





Current Medications


Nicardipine HCl 50 mg/Sodium Chloride 250 ml @  25 mls/hr CONT  PRN IV PER 

PROTOCOL Last administered on 22at 11:37;  Start 22 at 21:45


Nifedipine (Procardia Xl) 30 mg DAILY PO  Last administered on 22at 09:09; 

Start 22 at 09:00


Zolpidem Tartrate (Ambien) 5 mg PRN QHS  PRN PO INSOMNIA;  Start 22 at 

21:45


Montelukast Sodium (Singulair) 10 mg DAILY PO  Last administered on 22at 

08:57;  Start 22 at 09:00


Insulin Human Lispro (HumaLOG) 15 units TIDWMEALS SQ  Last administered on 

22at 13:28;  Start 22 at 08:00


Insulin Glargine (Lantus Syringe) 40 unit QHS SQ ;  Start 22 at 21:00


Losartan Potassium (Cozaar) 100 mg DAILY PO  Last administered on 22at 

08:56;  Start 22 at 09:00


Spironolactone (Aldactone) 50 mg DAILY PO  Last administered on 22at 08:56;

 Start 22 at 09:00


Insulin Glargine (Lantus Syringe) 40 unit 1X  ONCE SQ  Last administered on 

22at 22:21;  Start 22 at 23:00;  Stop 22 at 23:01;  Status DC





Active Scripts


Active


Levemir Flextouch (Insulin Detemir) 100 Unit/1 Ml Insuln.pen 40 Units SQ QHS 30 

Days


Novolog Flexpen (Insulin Aspart) 100 Unit/1 Ml Insuln.pen 15 Units SQ TIDAC 30 

Days


Reported


Tri-Sprintec (Norgestimate-Ethinyl Estradiol) 1 Each Tablet 1 Tab PO DAILY


Spironolactone 50 Mg Tablet 1 Tab PO DAILY


Montelukast Sodium Tablet  ** (Montelukast Sodium) 10 Mg Tablet 10 Mg PO DAILY


Losartan Potassium 100 Mg Tablet 100 Mg PO DAILY





Allergies


Allergies:  


Coded Allergies:  


     No Known Drug Allergies (Unverified , 17)





ROS


Review of System


   As per HPI, rest of the ROS is negative





Physical Exam


Physical Exam


GENERAL:  Morbidly Obese , Propped up in bed. NAD 


HEEN  Normocephalic, atraumatic.om mildly dry  


NECK:  Supple.


HEART:   normal first and second heart sounds.  No gallop, rub or murmur.


LUNGS :  Clear to auscultation, decreased at bases, non labored  


ABDOMEN:  Distended, soft, nontender.


NEUROLOGIC:  Grossly Normal, Moves all 4 extremities .  She did complain of 

numbness in her left side of the face without any obvious motor deficit.


PSYCH COOPERATIVE 


EXT No LE  edema  


 No Malhotra, No CVA or SP tenderness  


DERM No Rash


Vital Signs





Vital Signs








  Date Time  Temp Pulse Resp B/P (MAP) Pulse Ox O2 Delivery O2 Flow Rate FiO2


 


22 10:30  98 16 147/61 98 Room Air  


 


22 04:00 98.8       





 98.8       








Assessment & Plan


MICHELLE - Vasomotor  / Vomiting  /Poor po intake/ IV Contrast / Hx of NSAID use   . 

Baseline Cr normal in 2017 per [facility] records. No Interval labs available  .UA 

unremarkable, US Unremarkable    Supportive care, Gentle IV hydration  , Hold  

Aldactone  , ARB restarted by primary- Hold if worsrning renal function  . Avoid

Nephrotoxins.  . Strict I/O 





CKD - per Primary's note, No interval labs. Suspect CKD 2/2 DM and Uncontrolled 

HTN 





HTN Urgency   -   multiple antihypertensive medications including clonidine, 

hydralazine  metoprolol without improvement.Currently on  a Cardene drip  . 

Renal Duplex Normal 





Type 2 Diabetes mellitus. she stopped Insulin on her own recently  





Bronchial asthma





Labs


Labs





LABORATORY DATA: 


 white cell count 12.6, hemoglobin was 11.9, hematocrit 37, MCV 88 and platelet 

count of 105,000. Serum sodium 133, potassium 4.9, chloride 100, bicarbonate 24,

anion gap of 9, BUN 23, creatinine 1.8.  Estimated GFR was 37 mL per minute.


 Glucose 181, calcium   9.3.  Total bilirubin 0.5.  AST, ALT,   albumin 3. 


 Urinalysis   unremarkable.    toxic screen   negative. coronavirus by rapid 

antigen testing was negative.  














Laboratory Tests








Test


 22


21:30 22


04:55 22


08:39 22


13:25


 


Glucose (Fingerstick)


 281 mg/dL


(70-99) 


 282 mg/dL


(70-99) 281 mg/dL


(70-99)


 


White Blood Count


 


 12.9 x10^3/uL


(4.0-11.0) 


 





 


Red Blood Count


 


 3.64 x10^6/uL


(3.50-5.40) 


 





 


Hemoglobin


 


 10.0 g/dL


(12.0-15.5) 


 





 


Hematocrit


 


 31.4 %


(36.0-47.0) 


 





 


Mean Corpuscular Volume  86 fL ()   


 


Mean Corpuscular Hemoglobin  28 pg (25-35)   


 


Mean Corpuscular Hemoglobin


Concent 


 32 g/dL


(31-37) 


 





 


Red Cell Distribution Width


 


 13.4 %


(11.5-14.5) 


 





 


Platelet Count


 


 348 x10^3/uL


(140-400) 


 





 


Neutrophils (%) (Auto)  80 % (31-73)   


 


Lymphocytes (%) (Auto)  14 % (24-48)   


 


Monocytes (%) (Auto)  5 % (0-9)   


 


Eosinophils (%) (Auto)  1 % (0-3)   


 


Basophils (%) (Auto)  1 % (0-3)   


 


Neutrophils # (Auto)


 


 10.3 x10^3/uL


(1.8-7.7) 


 





 


Lymphocytes # (Auto)


 


 1.8 x10^3/uL


(1.0-4.8) 


 





 


Monocytes # (Auto)


 


 0.7 x10^3/uL


(0.0-1.1) 


 





 


Eosinophils # (Auto)


 


 0.1 x10^3/uL


(0.0-0.7) 


 





 


Basophils # (Auto)


 


 0.1 x10^3/uL


(0.0-0.2) 


 





 


Sodium Level


 


 136 mmol/L


(136-145) 


 





 


Potassium Level


 


 4.3 mmol/L


(3.5-5.1) 


 





 


Chloride Level


 


 103 mmol/L


() 


 





 


Carbon Dioxide Level


 


 22 mmol/L


(21-32) 


 





 


Anion Gap  11 (6-14)   


 


Blood Urea Nitrogen


 


 27 mg/dL


(7-20) 


 





 


Creatinine


 


 2.3 mg/dL


(0.6-1.0) 


 





 


Estimated GFR


(Cockcroft-Gault) 


 23.4 


 


 





 


BUN/Creatinine Ratio  12 (6-20)   


 


Glucose Level


 


 307 mg/dL


(70-99) 


 





 


Calcium Level


 


 8.2 mg/dL


(8.5-10.1) 


 





 


Total Bilirubin


 


 0.2 mg/dL


(0.2-1.0) 


 





 


Aspartate Amino Transf


(AST/SGOT) 


 52 U/L (15-37) 


 


 





 


Alanine Aminotransferase


(ALT/SGPT) 


 130 U/L


(14-59) 


 





 


Alkaline Phosphatase


 


 151 U/L


() 


 





 


Total Protein


 


 6.5 g/dL


(6.4-8.2) 


 





 


Albumin


 


 2.2 g/dL


(3.4-5.0) 


 





 


Albumin/Globulin Ratio  0.5 (1.0-1.7)   


 


Triglycerides Level


 


 107 mg/dL


(0-150) 


 





 


Cholesterol Level


 


 188 mg/dL


(0-200) 


 





 


LDL Cholesterol, Calculated


 


 115 mg/dL


(0-100) 


 





 


VLDL Cholesterol, Calculated


 


 21 mg/dL


(0-40) 


 





 


Non-HDL Cholesterol Calculated


 


 136 mg/dL


(0-129) 


 





 


HDL Cholesterol


 


 52 mg/dL


(40-60) 


 





 


Cholesterol/HDL Ratio  3.6   








Laboratory Tests








Test


 22


21:30 22


04:55 22


08:39 22


13:25


 


Glucose (Fingerstick)


 281 mg/dL


(70-99) 


 282 mg/dL


(70-99) 281 mg/dL


(70-99)


 


White Blood Count


 


 12.9 x10^3/uL


(4.0-11.0) 


 





 


Red Blood Count


 


 3.64 x10^6/uL


(3.50-5.40) 


 





 


Hemoglobin


 


 10.0 g/dL


(12.0-15.5) 


 





 


Hematocrit


 


 31.4 %


(36.0-47.0) 


 





 


Mean Corpuscular Volume  86 fL ()   


 


Mean Corpuscular Hemoglobin  28 pg (25-35)   


 


Mean Corpuscular Hemoglobin


Concent 


 32 g/dL


(31-37) 


 





 


Red Cell Distribution Width


 


 13.4 %


(11.5-14.5) 


 





 


Platelet Count


 


 348 x10^3/uL


(140-400) 


 





 


Neutrophils (%) (Auto)  80 % (31-73)   


 


Lymphocytes (%) (Auto)  14 % (24-48)   


 


Monocytes (%) (Auto)  5 % (0-9)   


 


Eosinophils (%) (Auto)  1 % (0-3)   


 


Basophils (%) (Auto)  1 % (0-3)   


 


Neutrophils # (Auto)


 


 10.3 x10^3/uL


(1.8-7.7) 


 





 


Lymphocytes # (Auto)


 


 1.8 x10^3/uL


(1.0-4.8) 


 





 


Monocytes # (Auto)


 


 0.7 x10^3/uL


(0.0-1.1) 


 





 


Eosinophils # (Auto)


 


 0.1 x10^3/uL


(0.0-0.7) 


 





 


Basophils # (Auto)


 


 0.1 x10^3/uL


(0.0-0.2) 


 





 


Sodium Level


 


 136 mmol/L


(136-145) 


 





 


Potassium Level


 


 4.3 mmol/L


(3.5-5.1) 


 





 


Chloride Level


 


 103 mmol/L


() 


 





 


Carbon Dioxide Level


 


 22 mmol/L


(21-32) 


 





 


Anion Gap  11 (6-14)   


 


Blood Urea Nitrogen


 


 27 mg/dL


(7-20) 


 





 


Creatinine


 


 2.3 mg/dL


(0.6-1.0) 


 





 


Estimated GFR


(Cockcroft-Gault) 


 23.4 


 


 





 


BUN/Creatinine Ratio  12 (6-20)   


 


Glucose Level


 


 307 mg/dL


(70-99) 


 





 


Calcium Level


 


 8.2 mg/dL


(8.5-10.1) 


 





 


Total Bilirubin


 


 0.2 mg/dL


(0.2-1.0) 


 





 


Aspartate Amino Transf


(AST/SGOT) 


 52 U/L (15-37) 


 


 





 


Alanine Aminotransferase


(ALT/SGPT) 


 130 U/L


(14-59) 


 





 


Alkaline Phosphatase


 


 151 U/L


() 


 





 


Total Protein


 


 6.5 g/dL


(6.4-8.2) 


 





 


Albumin


 


 2.2 g/dL


(3.4-5.0) 


 





 


Albumin/Globulin Ratio  0.5 (1.0-1.7)   


 


Triglycerides Level


 


 107 mg/dL


(0-150) 


 





 


Cholesterol Level


 


 188 mg/dL


(0-200) 


 





 


LDL Cholesterol, Calculated


 


 115 mg/dL


(0-100) 


 





 


VLDL Cholesterol, Calculated


 


 21 mg/dL


(0-40) 


 





 


Non-HDL Cholesterol Calculated


 


 136 mg/dL


(0-129) 


 





 


HDL Cholesterol


 


 52 mg/dL


(40-60) 


 





 


Cholesterol/HDL Ratio  3.6   








Review


All relevant outside records, renal labs, imaging studies, telemetry/EKG's were 

reviewed.





Images


Images


 CT angio of the chest,no evidence of main lobar or definite proximal segmental 

pulmonary embolism on limited evaluation, pulmonary hypoexpansion with minimal 

nonspecific patchy ground glass opacities in the upper lobes, greater on the 


right, cardiomegaly, 5 mm pulmonary nodule, right middle lobe.











US RENAL DUPLEX





History: Reason: HTN





Comparison: None.





Technique: Multiple grayscale, color flow, and Doppler spectral waveform 

analysis images of the abdominal aorta and renal arteries were obtained.





Findings: 


Abdominal aorta peak systolic velocity: 141 cm/sec.





Right main renal artery peak systolic velocity: 108 cm/sec. 


Right renal artery to aorta ratio: 0.7





Left main renal artery peak systolic velocity: 131 cm/sec.


Left renal artery to aorta ratio: 0.9





Renal veins are patent. IVC unremarkable. 





The right kidney measures 11.4 cm. The left kidney measures 12.3 cm. Kidneys are

normal in echotexture. No hydronephrosis. Normal bilateral renal artery 

resistive indices.





IMPRESSION:


1.  No evidence of renal artery stenosis.











RAMESH LITTLEJOHN MD                2022 15:01

## 2022-04-25 NOTE — RAD
US RENAL DUPLEX



History: Reason: HTN



Comparison: None.



Technique: Multiple grayscale, color flow, and Doppler spectral waveform analysis images of the abdom
inal aorta and renal arteries were obtained.



Findings: 

Abdominal aorta peak systolic velocity: 141 cm/sec.



Right main renal artery peak systolic velocity: 108 cm/sec. 

Right renal artery to aorta ratio: 0.7



Left main renal artery peak systolic velocity: 131 cm/sec.

Left renal artery to aorta ratio: 0.9



Renal veins are patent. IVC unremarkable. 



The right kidney measures 11.4 cm. The left kidney measures 12.3 cm. Kidneys are normal in echotextur
e. No hydronephrosis. Normal bilateral renal artery resistive indices.



IMPRESSION:

1.  No evidence of renal artery stenosis.



Electronically signed by: Juan Ramon Hsu DO (4/25/2022 9:17 AM) [id number]

## 2022-04-25 NOTE — NUR
Dr. Ellis paged and this RN requested clarification to blood pressure goal related to 
nicardipine IV medication. Provider clarified: utilize nicardipine if blood pressures become 
greater than 220 systolic or 120 diastolic. If blood pressures do become higher than 
220/120, then provider would like nicardipine used to decrease pressures. At this time, 
provider does not want PRN IV push dose of blood pressure medication such as hydralazine or 
labetalol. Education provided to RN that blood pressures need to be elevated in order to 
appropriately profuse stoke.

## 2022-04-26 VITALS — SYSTOLIC BLOOD PRESSURE: 194 MMHG | DIASTOLIC BLOOD PRESSURE: 82 MMHG

## 2022-04-26 VITALS — SYSTOLIC BLOOD PRESSURE: 173 MMHG | DIASTOLIC BLOOD PRESSURE: 80 MMHG

## 2022-04-26 VITALS — DIASTOLIC BLOOD PRESSURE: 82 MMHG | SYSTOLIC BLOOD PRESSURE: 166 MMHG

## 2022-04-26 VITALS — SYSTOLIC BLOOD PRESSURE: 181 MMHG | DIASTOLIC BLOOD PRESSURE: 79 MMHG

## 2022-04-26 VITALS — DIASTOLIC BLOOD PRESSURE: 83 MMHG | SYSTOLIC BLOOD PRESSURE: 182 MMHG

## 2022-04-26 VITALS — DIASTOLIC BLOOD PRESSURE: 80 MMHG | SYSTOLIC BLOOD PRESSURE: 176 MMHG

## 2022-04-26 VITALS — SYSTOLIC BLOOD PRESSURE: 188 MMHG | DIASTOLIC BLOOD PRESSURE: 77 MMHG

## 2022-04-26 VITALS — DIASTOLIC BLOOD PRESSURE: 71 MMHG | SYSTOLIC BLOOD PRESSURE: 183 MMHG

## 2022-04-26 VITALS — SYSTOLIC BLOOD PRESSURE: 194 MMHG | DIASTOLIC BLOOD PRESSURE: 84 MMHG

## 2022-04-26 VITALS — DIASTOLIC BLOOD PRESSURE: 75 MMHG | SYSTOLIC BLOOD PRESSURE: 178 MMHG

## 2022-04-26 VITALS — DIASTOLIC BLOOD PRESSURE: 83 MMHG | SYSTOLIC BLOOD PRESSURE: 200 MMHG

## 2022-04-26 VITALS — DIASTOLIC BLOOD PRESSURE: 77 MMHG | SYSTOLIC BLOOD PRESSURE: 176 MMHG

## 2022-04-26 VITALS — DIASTOLIC BLOOD PRESSURE: 81 MMHG | SYSTOLIC BLOOD PRESSURE: 180 MMHG

## 2022-04-26 VITALS — DIASTOLIC BLOOD PRESSURE: 78 MMHG | SYSTOLIC BLOOD PRESSURE: 174 MMHG

## 2022-04-26 VITALS — SYSTOLIC BLOOD PRESSURE: 214 MMHG | DIASTOLIC BLOOD PRESSURE: 91 MMHG

## 2022-04-26 VITALS — DIASTOLIC BLOOD PRESSURE: 80 MMHG | SYSTOLIC BLOOD PRESSURE: 174 MMHG

## 2022-04-26 VITALS — DIASTOLIC BLOOD PRESSURE: 72 MMHG | SYSTOLIC BLOOD PRESSURE: 173 MMHG

## 2022-04-26 VITALS — DIASTOLIC BLOOD PRESSURE: 63 MMHG | SYSTOLIC BLOOD PRESSURE: 148 MMHG

## 2022-04-26 VITALS — DIASTOLIC BLOOD PRESSURE: 78 MMHG | SYSTOLIC BLOOD PRESSURE: 182 MMHG

## 2022-04-26 VITALS — SYSTOLIC BLOOD PRESSURE: 162 MMHG | DIASTOLIC BLOOD PRESSURE: 68 MMHG

## 2022-04-26 VITALS — SYSTOLIC BLOOD PRESSURE: 193 MMHG | DIASTOLIC BLOOD PRESSURE: 90 MMHG

## 2022-04-26 VITALS — SYSTOLIC BLOOD PRESSURE: 170 MMHG | DIASTOLIC BLOOD PRESSURE: 89 MMHG

## 2022-04-26 VITALS — SYSTOLIC BLOOD PRESSURE: 182 MMHG | DIASTOLIC BLOOD PRESSURE: 88 MMHG

## 2022-04-26 LAB
ALBUMIN SERPL-MCNC: 2.4 G/DL (ref 3.4–5)
ALBUMIN SERPL-MCNC: 2.4 G/DL (ref 3.4–5)
ALBUMIN/GLOB SERPL: 0.5 {RATIO} (ref 1–1.7)
ALBUMIN/GLOB SERPL: 0.5 {RATIO} (ref 1–1.7)
ALP SERPL-CCNC: 151 U/L (ref 46–116)
ALP SERPL-CCNC: 168 U/L (ref 46–116)
ALT SERPL-CCNC: 120 U/L (ref 14–59)
ALT SERPL-CCNC: 122 U/L (ref 14–59)
ANION GAP SERPL CALC-SCNC: 11 MMOL/L (ref 6–14)
ANION GAP SERPL CALC-SCNC: 14 MMOL/L (ref 6–14)
AST SERPL-CCNC: 29 U/L (ref 15–37)
AST SERPL-CCNC: 32 U/L (ref 15–37)
BILIRUB SERPL-MCNC: 0.2 MG/DL (ref 0.2–1)
BILIRUB SERPL-MCNC: 0.2 MG/DL (ref 0.2–1)
BUN SERPL-MCNC: 30 MG/DL (ref 7–20)
BUN SERPL-MCNC: 30 MG/DL (ref 7–20)
BUN/CREAT SERPL: 8 (ref 6–20)
BUN/CREAT SERPL: 8 (ref 6–20)
CALCIUM SERPL-MCNC: 8.1 MG/DL (ref 8.5–10.1)
CALCIUM SERPL-MCNC: 8.4 MG/DL (ref 8.5–10.1)
CHLORIDE SERPL-SCNC: 102 MMOL/L (ref 98–107)
CHLORIDE SERPL-SCNC: 102 MMOL/L (ref 98–107)
CO2 SERPL-SCNC: 20 MMOL/L (ref 21–32)
CO2 SERPL-SCNC: 21 MMOL/L (ref 21–32)
CREAT SERPL-MCNC: 3.6 MG/DL (ref 0.6–1)
CREAT SERPL-MCNC: 3.6 MG/DL (ref 0.6–1)
ERYTHROCYTE [DISTWIDTH] IN BLOOD BY AUTOMATED COUNT: 13.6 % (ref 11.5–14.5)
GFR SERPLBLD BASED ON 1.73 SQ M-ARVRAT: 13.9 ML/MIN
GFR SERPLBLD BASED ON 1.73 SQ M-ARVRAT: 13.9 ML/MIN
GLUCOSE SERPL-MCNC: 202 MG/DL (ref 70–99)
GLUCOSE SERPL-MCNC: 268 MG/DL (ref 70–99)
HCT VFR BLD CALC: 34.1 % (ref 36–47)
HGB BLD-MCNC: 10.9 G/DL (ref 12–15.5)
MCH RBC QN AUTO: 28 PG (ref 25–35)
MCHC RBC AUTO-ENTMCNC: 32 G/DL (ref 31–37)
MCV RBC AUTO: 87 FL (ref 79–100)
PLATELET # BLD AUTO: 362 X10^3/UL (ref 140–400)
POTASSIUM SERPL-SCNC: 3.9 MMOL/L (ref 3.5–5.1)
POTASSIUM SERPL-SCNC: 4.1 MMOL/L (ref 3.5–5.1)
PROT SERPL-MCNC: 6.9 G/DL (ref 6.4–8.2)
PROT SERPL-MCNC: 6.9 G/DL (ref 6.4–8.2)
RBC # BLD AUTO: 3.94 X10^6/UL (ref 3.5–5.4)
SODIUM SERPL-SCNC: 134 MMOL/L (ref 136–145)
SODIUM SERPL-SCNC: 136 MMOL/L (ref 136–145)
WBC # BLD AUTO: 13 X10^3/UL (ref 4–11)

## 2022-04-26 RX ADMIN — ONDANSETRON PRN MG: 4 TABLET, ORALLY DISINTEGRATING ORAL at 23:56

## 2022-04-26 RX ADMIN — INSULIN LISPRO SCH UNITS: 100 INJECTION, SOLUTION INTRAVENOUS; SUBCUTANEOUS at 17:43

## 2022-04-26 RX ADMIN — SPIRONOLACTONE SCH MG: 25 TABLET ORAL at 09:28

## 2022-04-26 RX ADMIN — ENOXAPARIN SODIUM SCH MG: 100 INJECTION SUBCUTANEOUS at 17:41

## 2022-04-26 RX ADMIN — BACITRACIN SCH MLS/HR: 5000 INJECTION, POWDER, FOR SOLUTION INTRAMUSCULAR at 17:55

## 2022-04-26 RX ADMIN — BACITRACIN SCH MLS/HR: 5000 INJECTION, POWDER, FOR SOLUTION INTRAMUSCULAR at 20:36

## 2022-04-26 RX ADMIN — ACETAMINOPHEN PRN MG: 325 TABLET, FILM COATED ORAL at 01:08

## 2022-04-26 RX ADMIN — ASPIRIN SCH MG: 325 TABLET, DELAYED RELEASE ORAL at 09:28

## 2022-04-26 RX ADMIN — INSULIN GLARGINE SCH UNIT: 100 INJECTION, SOLUTION SUBCUTANEOUS at 20:37

## 2022-04-26 RX ADMIN — INSULIN LISPRO SCH UNITS: 100 INJECTION, SOLUTION INTRAVENOUS; SUBCUTANEOUS at 12:47

## 2022-04-26 RX ADMIN — INSULIN LISPRO SCH UNITS: 100 INJECTION, SOLUTION INTRAVENOUS; SUBCUTANEOUS at 09:33

## 2022-04-26 RX ADMIN — ATORVASTATIN CALCIUM SCH MG: 40 TABLET, FILM COATED ORAL at 20:35

## 2022-04-26 RX ADMIN — LOSARTAN POTASSIUM SCH MG: 50 TABLET ORAL at 09:28

## 2022-04-26 RX ADMIN — MONTELUKAST SODIUM SCH MG: 10 TABLET, FILM COATED ORAL at 09:28

## 2022-04-26 RX ADMIN — ACETAMINOPHEN PRN MG: 325 TABLET, FILM COATED ORAL at 23:51

## 2022-04-26 RX ADMIN — ONDANSETRON PRN MG: 4 TABLET, ORALLY DISINTEGRATING ORAL at 15:16

## 2022-04-26 NOTE — PDOC
DATE OF SERVICE


DATE: 4/26/22 


TIME: 10:25





SUBJECTIVE


ROS


Feeling Tired. No N/V. 


Voided x 1





OBJECTIVE


Vital Signs





Vital Signs








  Date Time  Temp Pulse Resp B/P (MAP) Pulse Ox O2 Delivery O2 Flow Rate FiO2


 


4/26/22 09:28  90  176/80    


 


4/26/22 06:00     95 Room Air  


 


4/26/22 05:00   16     


 


4/26/22 04:00 99.0       





 99.0       








I & 0











Intake and Output 


 


 4/26/22





 07:00


 


Intake Total 2620 ml


 


Output Total 0 ml


 


Balance 2620 ml


 


 


 


Intake Oral 1200 ml


 


IV Total 1420 ml


 


Output Urine Total 0 ml


 


# Voids 1











PHYSICAL EXAM


Physical Exam


GENERAL:  Morbidly Obese , Propped up in bed. NAD 


HEEN  Normocephalic, atraumatic.om mildly dry  


NECK:  Supple.


HEART:   normal first and second heart sounds.  No gallop, rub or murmur.


LUNGS :  Clear to auscultation, decreased at bases, non labored  


ABDOMEN:  Distended, soft, nontender.


NEUROLOGIC:  Grossly Normal, Moves all 4 extremities .  She did complain of 

numbness in her left side of the face without any obvious motor deficit.


PSYCH COOPERATIVE 


EXT No LE  edema  


 No Malhotra, No CVA or SP tenderness  


DERM No Rash





DIAGNOSIS/ASSESSMENT


Assessment & Plan


MICHELLE -atn   / Vomiting  /Poor po intake/ IV Contrast / Hx of NSAID use   . 

Baseline Cr normal in 2017 per [facility] records. No Interval labs available  .UA 

unremarkable, US Unremarkable, worsening renal function, Place Malhotra  


 Supportive care,  Hold  Aldactone  .Recommend holding ARB as well  . Avoid 

Nephrotoxins.  . Strict I/O 





CKD - per Primary's note, No interval labs. Suspect CKD 2/2 DM and Uncontrolled 

HTN 





HTN Urgency   -   multiple antihypertensive medications including clonidine, 

hydralazine  metoprolol without improvement.  . Renal Duplex Normal . Off 

cardene  gtt; per neurology  keep higher BP today  





Hypertensive encephalopathy, also has a small left lateral liset infarct 

extending to the left brachium pontis- NEUROLOGY FOLLOWING  . 





Type 2 Diabetes mellitus. she stopped Insulin on her own recently  





Bronchial asthma





COMMENT/RELEVANT DATA


Meds





Current Medications








 Medications


  (Trade)  Dose


 Ordered  Sig/Kavitha  Start Time


 Stop Time Status Last Admin


Dose Admin


 


 Acetaminophen


  (Tylenol)  650 mg  PRN Q6HRS  PRN  4/25/22 16:45


    4/26/22 01:08


650 MG


 


 Aspirin


  (Aspirin Rectal


 Supp)  300 mg  PRN DAILY  PRN  4/25/22 16:45


     





 


 Aspirin


  (Ecotrin)  325 mg  DAILYWBKFT  4/25/22 17:00


    4/26/22 09:28


325 MG


 


 Atorvastatin


 Calcium


  (Lipitor)  80 mg  QHS  4/25/22 21:00


    4/25/22 20:18


80 MG


 


 Insulin Glargine


  (Lantus Syringe)  40 unit  1X  ONCE  4/24/22 23:00


 4/24/22 23:01 DC 4/24/22 22:21


40 UNIT


 


 Insulin Human


 Lispro


  (HumaLOG)  20 units  TIDWMEALS  4/26/22 08:45


    4/26/22 09:33


20 UNITS


 


 Losartan Potassium


  (Cozaar)  100 mg  DAILY  4/25/22 09:00


    4/26/22 09:28


100 MG


 


 Montelukast Sodium


  (Singulair)  10 mg  DAILY  4/25/22 09:00


    4/26/22 09:28


10 MG


 


 Nicardipine HCl


 50 mg/Sodium


 Chloride  250 ml @ 


 25 mls/hr  CONT PRN  PRN  4/25/22 16:45


    4/25/22 21:31


12.5 MLS/HR


 


 Nifedipine


  (Procardia Xl)  30 mg  DAILY  4/25/22 09:00


    4/26/22 09:27


30 MG


 


 Sodium Chloride  1,000 ml @ 


 75 mls/hr  [id number]  4/25/22 15:15


    4/25/22 20:18


75 MLS/HR


 


 Spironolactone


  (Aldactone)  50 mg  DAILY  4/25/22 09:00


    4/26/22 09:28


50 MG


 


 Zolpidem Tartrate


  (Ambien)  5 mg  PRN QHS  PRN  4/24/22 21:45


     











Lab





Laboratory Tests








Test


 4/25/22


13:25 4/25/22


18:27 4/25/22


20:26 4/26/22


04:15


 


Glucose (Fingerstick)


 281 mg/dL


(70-99) 256 mg/dL


(70-99) 209 mg/dL


(70-99) 





 


Sodium Level


 


 


 


 134 mmol/L


(136-145)


 


Potassium Level


 


 


 


 4.1 mmol/L


(3.5-5.1)


 


Chloride Level


 


 


 


 102 mmol/L


()


 


Carbon Dioxide Level


 


 


 


 21 mmol/L


(21-32)


 


Anion Gap    11 (6-14) 


 


Blood Urea Nitrogen


 


 


 


 30 mg/dL


(7-20)


 


Creatinine


 


 


 


 3.6 mg/dL


(0.6-1.0)


 


Estimated GFR


(Cockcroft-Gault) 


 


 


 13.9 





 


BUN/Creatinine Ratio    8 (6-20) 


 


Glucose Level


 


 


 


 268 mg/dL


(70-99)


 


Calcium Level


 


 


 


 8.1 mg/dL


(8.5-10.1)


 


Total Bilirubin


 


 


 


 0.2 mg/dL


(0.2-1.0)


 


Aspartate Amino Transf


(AST/SGOT) 


 


 


 29 U/L (15-37) 





 


Alanine Aminotransferase


(ALT/SGPT) 


 


 


 122 U/L


(14-59)


 


Alkaline Phosphatase


 


 


 


 168 U/L


()


 


Total Protein


 


 


 


 6.9 g/dL


(6.4-8.2)


 


Albumin


 


 


 


 2.4 g/dL


(3.4-5.0)


 


Albumin/Globulin Ratio    0.5 (1.0-1.7) 


 


Test


 4/26/22


09:19 4/26/22


10:05 


 





 


Glucose (Fingerstick)


 210 mg/dL


(70-99) 


 


 





 


White Blood Count


 


 13.0 x10^3/uL


(4.0-11.0) 


 





 


Red Blood Count


 


 3.94 x10^6/uL


(3.50-5.40) 


 





 


Hemoglobin


 


 10.9 g/dL


(12.0-15.5) 


 





 


Hematocrit


 


 34.1 %


(36.0-47.0) 


 





 


Mean Corpuscular Volume  87 fL ()   


 


Mean Corpuscular Hemoglobin  28 pg (25-35)   


 


Mean Corpuscular Hemoglobin


Concent 


 32 g/dL


(31-37) 


 





 


Red Cell Distribution Width


 


 13.6 %


(11.5-14.5) 


 





 


Platelet Count


 


 362 x10^3/uL


(140-400) 


 











Results


All relevant outside records, renal labs, imaging studies, telemetry/EKG's were 

reviewed.





Justicifation of Admission Dx:


Justifications for Admission:


Justification of Admission Dx:  Yes


Stroke - Ischemic:  Stroke-Ischemic











RAMESH LITTLEJOHN MD                Apr 26, 2022 10:33

## 2022-04-26 NOTE — PN
DATE: 04/26/2022

SUBJECTIVE:  The patient is resting, slightly propped up in bed, in no apparent 

distress, sleepy, but arousable.  On questioning her, she stated that she is 

thirsty and hungry.  She apparently was kept n.p.o.  We will check her fasting 

lipid profile.  She has had an MRI, which showed that there is a focal area of 

acute subacute ischemia involving the left lateral liset extending to the left 

brachium pontis associated cytotoxic edema without significant mass effect or 

hemorrhage and there are T2 FLAIR signal hyperintense foci in the 

periventricular subcortical white matter, most suggestive of mild chronic small 

vessel ischemic changes.  Her kidney function unfortunately continued to 

steadily worsening.  Her creatinine has risen from 2.3 to 3.6 that is probably a

manifestation at least in part of the contrast-induced nephropathy given that 

creatinine was high and she was given the contrast and she is diabetic and 

probably has hypertensive and diabetic nephropathy.



PHYSICAL EXAMINATION:

GENERAL:  When I saw her today, she looked well and was clearly in no apparent 

respiratory distress.  She is pale, but not jaundiced, cyanosed or thyromegaly. 

No jugular venous distention.  No lower limb edema.

VITAL SIGNS:  Her heart rate was 94, blood pressure was 181/79, temperature was 

99, respiratory rate was 16 and oxygen saturation was 95%.

HEAD, EYES, EARS, NOSE, AND THROAT:  Showed normocephalic, atraumatic.

NECK:  Supple.

HEART:  Normal first and second heart sounds.  No gallop, rub or murmur.

CHEST:  Clear to auscultation and percussion or rhonchi.

ABDOMEN:  Distended, soft, nontender.

NEUROLOGIC:  She is awake, alert, responding appropriately.  All her cranial 

nerves intact.  She moves extremities without difficulty.



Her intake over the last 24 hours and output are incompletely recorded.



LABORATORY DATA:  Her lab work as of yesterday showed a white cell count 12.9, 

hemoglobin 10, hematocrit 31, MCV 86 and platelet count of 348,000.  Her 

chemistry this morning showed a serum sodium 134, potassium 4.1, chloride 102, 

bicarbonate 21, anion gap of 11, BUN 30, creatinine 3.6.  Estimated GFR was 14 

mL per minute.  Her glucose was 268, calcium was 8.1.  Total bilirubin and AST 

normal, ALT and alkaline phosphatase slightly elevated.  Total protein 6.9, 

albumin was 2.4.



ASSESSMENT:

1.  Hypertensive urgency for which she was started on nicardipine drip.  We did 

restart her losartan as well as added Procardia; however, the neurologist wanted

her to maintain her systolic pressure at the higher level.

2.  She did complain of tingling and numbness in the left side of her face and 

MRI showed that she has focal area of acute subacute ischemia involving the left

lateral liset extending to the left brachium pontis.  There is associated 

cytotoxic edema without significant mass effect or hemorrhage.

3.  Acute-on-chronic kidney injury.  On arrival to the Emergency Room, her 

creatinine was 1.8.  She did receive contrast yesterday and probably has 

underlying hypertensive diabetic nephropathy.  Her creatinine unfortunately is 

rising and today it is 3.6.

4.  The patient is known to have hypertension that is poorly controlled.

5.  Type 2 diabetes mellitus.

6.  Bronchial asthma.

7.  Morbid obesity and questionable obstructive sleep apnea.



PLAN:  To continue with nicardipine drip.  Continue with losartan and nifedipine

as is and tomorrow, we will increase the nifedipine higher and try to taper drip

and stop it all completely.  Her blood sugar continues to be poorly controlled, 

so I will make further adjustment to her insulin.  Once she is off nicardipine 

drip, we will start also the physical and recreational therapy.  I will use SCDs

for DVT prophylaxis.







AMM/LAURYN/RENAE

DR: Hernan   DD: 04/26/2022 08:45

DT: 04/26/2022 09:51   TID: [id number]

## 2022-04-26 NOTE — PDOC
PROGRESS NOTES


Date of Service


DATE: 4/26/22 


TIME: 09:11


Assessment


Hypertensive encephalopathy, also has a small left lateral liset infarct 

extending to the left brachium pontis


History of diabetes, hypertension, hyperlipidemia, noncompliant


Plan


Aspirin


High-dose statin


Treatment of hypertension.  Relaxed blood pressure control today, 220/120, 

recompose stricter blood pressure control starting tomorrow


Rehabilitation modalities


Await echocardiogram


Discussed with Dr. Chao


Subjective


Feels better, denies headache, has some back pain


Objective





Vital Signs








  Date Time  Temp Pulse Resp B/P (MAP) Pulse Ox O2 Delivery O2 Flow Rate FiO2


 


4/26/22 06:00  94  181/79 95 Room Air  


 


4/26/22 05:00   16     


 


4/26/22 04:00 99.0       





 99.0       














Intake and Output 


 


 4/26/22





 07:00


 


Intake Total 2620 ml


 


Output Total 0 ml


 


Balance 2620 ml


 


 


 


Intake Oral 1200 ml


 


IV Total 1420 ml


 


Output Urine Total 0 ml


 


# Voids 1








PHYSICAL EXAM


Alert. Oriented to time, place and person.


PERRL.


EOMI.


CN: Slight left cheek sensory loss, otherwise no focal findings.


Muscle tone: normal.


Muscle strength: 5/


DTR: 2+


Plantar reflex: Flexor


Gait: not examined in bed.


Sensory exam: no abnormal findings.


No cerebellar signs elicited.


Review of Relevant


I have reviewed the following items lana (where applicable) has been applied.


Labs





Laboratory Tests








Test


 4/24/22


21:30 4/25/22


04:55 4/25/22


08:39 4/25/22


13:25


 


Glucose (Fingerstick)


 281 mg/dL


(70-99) 


 282 mg/dL


(70-99) 281 mg/dL


(70-99)


 


White Blood Count


 


 12.9 x10^3/uL


(4.0-11.0) 


 





 


Red Blood Count


 


 3.64 x10^6/uL


(3.50-5.40) 


 





 


Hemoglobin


 


 10.0 g/dL


(12.0-15.5) 


 





 


Hematocrit


 


 31.4 %


(36.0-47.0) 


 





 


Mean Corpuscular Volume  86 fL ()   


 


Mean Corpuscular Hemoglobin  28 pg (25-35)   


 


Mean Corpuscular Hemoglobin


Concent 


 32 g/dL


(31-37) 


 





 


Red Cell Distribution Width


 


 13.4 %


(11.5-14.5) 


 





 


Platelet Count


 


 348 x10^3/uL


(140-400) 


 





 


Neutrophils (%) (Auto)  80 % (31-73)   


 


Lymphocytes (%) (Auto)  14 % (24-48)   


 


Monocytes (%) (Auto)  5 % (0-9)   


 


Eosinophils (%) (Auto)  1 % (0-3)   


 


Basophils (%) (Auto)  1 % (0-3)   


 


Neutrophils # (Auto)


 


 10.3 x10^3/uL


(1.8-7.7) 


 





 


Lymphocytes # (Auto)


 


 1.8 x10^3/uL


(1.0-4.8) 


 





 


Monocytes # (Auto)


 


 0.7 x10^3/uL


(0.0-1.1) 


 





 


Eosinophils # (Auto)


 


 0.1 x10^3/uL


(0.0-0.7) 


 





 


Basophils # (Auto)


 


 0.1 x10^3/uL


(0.0-0.2) 


 





 


Sodium Level


 


 136 mmol/L


(136-145) 


 





 


Potassium Level


 


 4.3 mmol/L


(3.5-5.1) 


 





 


Chloride Level


 


 103 mmol/L


() 


 





 


Carbon Dioxide Level


 


 22 mmol/L


(21-32) 


 





 


Anion Gap  11 (6-14)   


 


Blood Urea Nitrogen


 


 27 mg/dL


(7-20) 


 





 


Creatinine


 


 2.3 mg/dL


(0.6-1.0) 


 





 


Estimated GFR


(Cockcroft-Gault) 


 23.4 


 


 





 


BUN/Creatinine Ratio  12 (6-20)   


 


Glucose Level


 


 307 mg/dL


(70-99) 


 





 


Calcium Level


 


 8.2 mg/dL


(8.5-10.1) 


 





 


Total Bilirubin


 


 0.2 mg/dL


(0.2-1.0) 


 





 


Aspartate Amino Transf


(AST/SGOT) 


 52 U/L (15-37) 


 


 





 


Alanine Aminotransferase


(ALT/SGPT) 


 130 U/L


(14-59) 


 





 


Alkaline Phosphatase


 


 151 U/L


() 


 





 


Creatine Kinase


 


 226 U/L


() 


 





 


Total Protein


 


 6.5 g/dL


(6.4-8.2) 


 





 


Albumin


 


 2.2 g/dL


(3.4-5.0) 


 





 


Albumin/Globulin Ratio  0.5 (1.0-1.7)   


 


Triglycerides Level


 


 107 mg/dL


(0-150) 


 





 


Cholesterol Level


 


 188 mg/dL


(0-200) 


 





 


LDL Cholesterol, Calculated


 


 115 mg/dL


(0-100) 


 





 


VLDL Cholesterol, Calculated


 


 21 mg/dL


(0-40) 


 





 


Non-HDL Cholesterol Calculated


 


 136 mg/dL


(0-129) 


 





 


HDL Cholesterol


 


 52 mg/dL


(40-60) 


 





 


Cholesterol/HDL Ratio  3.6   


 


Test


 4/25/22


18:27 4/25/22


20:26 4/26/22


04:15 





 


Glucose (Fingerstick)


 256 mg/dL


(70-99) 209 mg/dL


(70-99) 


 





 


Sodium Level


 


 


 134 mmol/L


(136-145) 





 


Potassium Level


 


 


 4.1 mmol/L


(3.5-5.1) 





 


Chloride Level


 


 


 102 mmol/L


() 





 


Carbon Dioxide Level


 


 


 21 mmol/L


(21-32) 





 


Anion Gap   11 (6-14)  


 


Blood Urea Nitrogen


 


 


 30 mg/dL


(7-20) 





 


Creatinine


 


 


 3.6 mg/dL


(0.6-1.0) 





 


Estimated GFR


(Cockcroft-Gault) 


 


 13.9 


 





 


BUN/Creatinine Ratio   8 (6-20)  


 


Glucose Level


 


 


 268 mg/dL


(70-99) 





 


Calcium Level


 


 


 8.1 mg/dL


(8.5-10.1) 





 


Total Bilirubin


 


 


 0.2 mg/dL


(0.2-1.0) 





 


Aspartate Amino Transf


(AST/SGOT) 


 


 29 U/L (15-37) 


 





 


Alanine Aminotransferase


(ALT/SGPT) 


 


 122 U/L


(14-59) 





 


Alkaline Phosphatase


 


 


 168 U/L


() 





 


Total Protein


 


 


 6.9 g/dL


(6.4-8.2) 





 


Albumin


 


 


 2.4 g/dL


(3.4-5.0) 





 


Albumin/Globulin Ratio   0.5 (1.0-1.7)  








Laboratory Tests








Test


 4/25/22


13:25 4/25/22


18:27 4/25/22


20:26 4/26/22


04:15


 


Glucose (Fingerstick)


 281 mg/dL


(70-99) 256 mg/dL


(70-99) 209 mg/dL


(70-99) 





 


Sodium Level


 


 


 


 134 mmol/L


(136-145)


 


Potassium Level


 


 


 


 4.1 mmol/L


(3.5-5.1)


 


Chloride Level


 


 


 


 102 mmol/L


()


 


Carbon Dioxide Level


 


 


 


 21 mmol/L


(21-32)


 


Anion Gap    11 (6-14) 


 


Blood Urea Nitrogen


 


 


 


 30 mg/dL


(7-20)


 


Creatinine


 


 


 


 3.6 mg/dL


(0.6-1.0)


 


Estimated GFR


(Cockcroft-Gault) 


 


 


 13.9 





 


BUN/Creatinine Ratio    8 (6-20) 


 


Glucose Level


 


 


 


 268 mg/dL


(70-99)


 


Calcium Level


 


 


 


 8.1 mg/dL


(8.5-10.1)


 


Total Bilirubin


 


 


 


 0.2 mg/dL


(0.2-1.0)


 


Aspartate Amino Transf


(AST/SGOT) 


 


 


 29 U/L (15-37) 





 


Alanine Aminotransferase


(ALT/SGPT) 


 


 


 122 U/L


(14-59)


 


Alkaline Phosphatase


 


 


 


 168 U/L


()


 


Total Protein


 


 


 


 6.9 g/dL


(6.4-8.2)


 


Albumin


 


 


 


 2.4 g/dL


(3.4-5.0)


 


Albumin/Globulin Ratio    0.5 (1.0-1.7) 








Medications





Current Medications


Nicardipine HCl 50 mg/Sodium Chloride 250 ml @  25 mls/hr CONT  PRN IV PER 

PROTOCOL Last administered on 4/25/22at 15:53;  Start 4/24/22 at 21:45;  Stop 

4/25/22 at 16:43;  Status DC


Nifedipine (Procardia Xl) 30 mg DAILY PO  Last administered on 4/25/22at 09:09; 

Start 4/25/22 at 09:00


Zolpidem Tartrate (Ambien) 5 mg PRN QHS  PRN PO INSOMNIA;  Start 4/24/22 at 

21:45


Montelukast Sodium (Singulair) 10 mg DAILY PO  Last administered on 4/25/22at 

08:57;  Start 4/25/22 at 09:00


Insulin Human Lispro (HumaLOG) 15 units TIDWMEALS SQ  Last administered on 

4/25/22at 18:30;  Start 4/25/22 at 08:00;  Stop 4/26/22 at 08:47;  Status DC


Insulin Glargine (Lantus Syringe) 40 unit QHS SQ  Last administered on 4/25/22at

20:33;  Start 4/25/22 at 21:00


Losartan Potassium (Cozaar) 100 mg DAILY PO  Last administered on 4/25/22at 

08:56;  Start 4/25/22 at 09:00


Spironolactone (Aldactone) 50 mg DAILY PO  Last administered on 4/25/22at 08:56;

 Start 4/25/22 at 09:00


Insulin Glargine (Lantus Syringe) 40 unit 1X  ONCE SQ  Last administered on 

4/24/22at 22:21;  Start 4/24/22 at 23:00;  Stop 4/24/22 at 23:01;  Status DC


Sodium Chloride 1,000 ml @  75 mls/hr [id number] IV  Last administered on 4/25/22at

20:18;  Start 4/25/22 at 15:15


Nicardipine HCl 50 mg/Sodium Chloride 250 ml @  25 mls/hr CONT PRN  PRN IV PER 

PROTOCOL Last administered on 4/25/22at 21:31;  Start 4/25/22 at 16:45


Atorvastatin Calcium (Lipitor) 80 mg QHS PO  Last administered on 4/25/22at 

20:18;  Start 4/25/22 at 21:00


Acetaminophen (Tylenol) 650 mg PRN Q6HRS  PRN PO MILD PAIN / TEMP > 100.3'F Last

administered on 4/26/22at 01:08;  Start 4/25/22 at 16:45


Aspirin (Ecotrin) 325 mg DAILYWBKFT PO  Last administered on 4/25/22at 20:18;  

Start 4/25/22 at 17:00


Aspirin (Aspirin Rectal Supp) 300 mg PRN DAILY  PRN MI IF UNABLE TO TAKE PO;  

Start 4/25/22 at 16:45


Insulin Human Lispro (HumaLOG) 20 units TIDWMEALS SQ ;  Start 4/26/22 at 08:45





Active Scripts


Active


Levemir Flextouch (Insulin Detemir) 100 Unit/1 Ml Insuln.pen 40 Units SQ QHS 30 

Days


Novolog Flexpen (Insulin Aspart) 100 Unit/1 Ml Insuln.pen 15 Units SQ TIDAC 30 

Days


Reported


Tri-Sprintec (Norgestimate-Ethinyl Estradiol) 1 Each Tablet 1 Tab PO DAILY


Spironolactone 50 Mg Tablet 1 Tab PO DAILY


Montelukast Sodium Tablet  ** (Montelukast Sodium) 10 Mg Tablet 10 Mg PO DAILY


Losartan Potassium 100 Mg Tablet 100 Mg PO DAILY


Vitals/I & O





Vital Sign - Last 24 Hours








 4/25/22 4/25/22 4/25/22 4/25/22





 10:00 10:30 11:00 11:30


 


Pulse 100 98 96 96


 


Resp 18 16 16 16


 


B/P (MAP) 172/80 147/61 153/78 157/74


 


Pulse Ox 98 98 96 96


 


O2 Delivery Room Air Room Air Room Air Room Air





 4/25/22 4/25/22 4/25/22 4/25/22





 11:45 12:00 12:00 13:00


 


Temp  98.6  





  98.6  


 


Pulse 88 94  96


 


Resp 16 16  16


 


B/P (MAP) 179/78 175/87  153/75


 


Pulse Ox 96 95  94


 


O2 Delivery Room Air Room Air Room Air Room Air


 


    





    





 4/25/22 4/25/22 4/25/22 4/25/22





 14:00 15:00 16:00 16:00


 


Pulse 96 98  102


 


Resp 16 16  16


 


B/P (MAP) 143/65 145/70  127/73


 


Pulse Ox 96 100  100


 


O2 Delivery Room Air Room Air Room Air Room Air





 4/25/22 4/25/22 4/25/22 4/25/22





 17:00 18:00 19:00 20:00


 


Temp    98.6





    98.6


 


Pulse 102 102 94 96


 


Resp 16 16 18 18


 


B/P (MAP)  150/78 215/81 217/71


 


Pulse Ox 100 100 97 96


 


O2 Delivery Room Air Room Air Room Air Room Air


 


    





    





 4/25/22 4/25/22 4/25/22 4/25/22





 20:00 20:30 20:45 21:00


 


Pulse  96 96 94


 


Resp    16


 


B/P (MAP)  160/74 164/89 166/82


 


Pulse Ox  97 98 97


 


O2 Delivery Room Air Room Air Room Air Room Air





 4/25/22 4/25/22 4/25/22 4/25/22





 21:15 21:30 21:45 22:00


 


Pulse 94 98 94 96


 


Resp    16


 


B/P (MAP) 174/78 174/73 146/71 154/65


 


Pulse Ox 98 97 94 92


 


O2 Delivery Room Air Room Air Room Air Room Air





 4/25/22 4/26/22 4/26/22 4/26/22





 23:00 00:00 00:00 01:00


 


Temp  98.7  





  98.7  


 


Pulse 90 94  102


 


Resp 15 16  


 


B/P (MAP) 131/62 148/63  162/68


 


Pulse Ox 90 97  98


 


O2 Delivery Room Air Room Air Room Air Room Air


 


    





    





 4/26/22 4/26/22 4/26/22 4/26/22





 02:00 03:00 04:00 04:00


 


Temp    99.0





    99.0


 


Pulse 96 94  95


 


Resp    16


 


B/P (MAP) 173/80 174/80  182/78


 


Pulse Ox 97 91  97


 


O2 Delivery Room Air Room Air Room Air Room Air


 


    





    





 4/26/22 4/26/22  





 05:00 06:00  


 


Pulse 96 94  


 


Resp 16   


 


B/P (MAP) 183/71 181/79  


 


Pulse Ox 96 95  


 


O2 Delivery Room Air Room Air  














Intake and Output   


 


 4/25/22 4/25/22 4/26/22





 15:00 23:00 07:00


 


Intake Total 250 ml 750 ml 1620 ml


 


Output Total   0 ml


 


Balance 250 ml 750 ml 1620 ml








Images


BRAIN W/O CONTRAST, 4/25





The scalp and calvarium are normal. The superior sagittal sinus demonstrates 

normal venous flow.  The corpus callosum is normal in shape and signal 

intensity. The posterior fossa is unremarkable.  The pituitary and sella are 

normal.  The brainstem and craniocervical junction are unremarkable.





There are T2/FLAIR signal hyperintense foci in the periventricular and 

subcortical white matter most suggestive of mild chronic small vessel ischemic 

changes.





Focal diffusion signal hyperintensity identified involving the left lateral liset

extending into the left brachium pontis with associated T2 signal hyperintensity

and low ADC signal compatible with acute/subacute ischemia. The susceptibility 

weighted sequences reveal no evidence of acute or chronic hemorrhage. The 

ventricles are normal in size and position without evidence of hydrocephalus.





Small mucus retention cyst identified within the right maxillary sinus.  The 

visualized portions of the mastoids are unremarkable. The orbits appear normal. 

Normal flow voids are demonstrated in the carotid arteries and basilar artery.





IMPRESSION:





There is a focal area of acute/subacute ischemia involving the left lateral liset

extending to the left brachium pontis. There is associated cytotoxic edema 

without significant mass effect or hemorrhage.





There are T2/FLAIR signal hyperintense foci in the periventricular and subcorti

ahmet white matter most suggestive of mild chronic small vessel ischemic changes.





US DPLX CAROTID BILAT, 4/25





History: Reason: hyertension, left face numb / Spl. Instructions:  / History: 





COMPARISON: None





Technique: Duplex sonography of the cervical portion of both carotid arteries 

was performed. Real-time grayscale, color flow Doppler, and Doppler spectral 

waveform analysis is performed.





PQRS Compliance Statement - Stenosis calculations for CT, MR and conventional 

angiography are based upon measurement of the distal ICA diameter in accordance 

with the NASCET methodology.  Stenosis calculations for carotid ultrasound 

studies are derived from validated velocity criteria which are known to 

correlate with the NASCET methodology.





Findings:


Right side:


Peak systolic flow velocity of the CCA is 116 cm/sec.


Peak systolic flow velocity of the ICA is 110 cm/sec.


The ICA/CCA ratio is 0.95.


Peak end diastolic flow velocity of the ICA is 30 cm/sec.


The peak systolic velocity of the ECA is 239 cm/sec.





Mild atheromatous plaque.





Left side:


Peak systolic flow velocity of the CCA is 139 cm/sec.


Peak systolic flow velocity of the ICA is 109 cm/sec.


The ICA/CCA ratio is 0.79.


Peak end diastolic flow velocity of the ICA is 101 cm/sec. 


Peak systolic flow velocity of the ECA is 234 cm/sec.





Mild atheromatous plaque.





Vertebral arteries: Bilateral vertebral arteries demonstrate antegrade flow.





IMPRESSION:


1.  No hemodynamically significant internal carotid artery stenosis.


2.  Mild atheromatous plaque.


3.  Elevated bilateral external carotid artery velocities, may indicate 

stenosis.





Justicifation of Admission Dx:


Justifications for Admission:


Justification of Admission Dx:  Yes


Stroke - Ischemic:  Stroke-Ischemic











ANGÉLICA MOYER MD           Apr 26, 2022 09:15

## 2022-04-26 NOTE — PN
DATE: 04/25/2022

SUBJECTIVE:   The patient is a 41-year-old -American female patient who 

was transferred yesterday from [facility] with hypertensive urgency.  

She also has multiple other medical problems including insulin requiring type 2 

diabetes mellitus.  She also has bronchial asthma and chronic kidney disease.  

She was started on a Cardizem drip.  When I saw her this morning, she was 

resting slightly propped up in bed, in no apparent distress.  She denied any 

further episode of dizziness, lightheadedness or vertigo.  Denied any chest 

pain.  She continued to complain of numbness in the left side of her face.



PHYSICAL EXAMINATION:

GENERAL:  When I examined her, she looked generally well and was clearly in no 

apparent respiratory distress.  She was somewhat pale, not jaundiced or 

cyanosed, no thyromegaly.  No jugular venous distention.  No limb edema.

VITAL SIGNS:  Her heart rate was 94, blood pressure was 162/71, temperature was 

98.8, respiratory rate was 16 and oxygen saturation was 95% on room air.

HEAD, EYES, EARS, NOSE AND THROAT:  Normocephalic, atraumatic.

NECK:  Supple.

HEART:  Showed normal first and second heart sounds.  No gallop, rub or murmur.

CHEST:  Clear to auscultation, no crepitation or rhonchi.

ABDOMEN:  Distended, soft, nontender, no guarding or rigidity.  No organomegaly.

 All hernial orifice intact.  Bowel sounds normal.

NEUROLOGIC:  She is awake, alert, responding appropriately.  All her cranial 

nerves are grossly intact.  She moves all extremities without difficulty.



LABORATORY DATA:  Her lab work showed a white cell count 12.9, hemoglobin 10, 

hematocrit 31, MCV 86 and platelet count of 348,000.  Her chemistry showed a 

serum sodium 136, potassium 4.3, chloride 103, bicarbonate 22, anion gap of 11, 

BUN 27, creatinine 2.3.  Estimated GFR was 23 mL per minute.  Her glucose 307, 

calcium was 8.2.  Total bilirubin, AST, ALT, alkaline phosphatase are slightly 

elevated, although they are trending down.  Her total protein is 6.5, albumin 

was 2.2.  She had a CT scan of the head that showed no acute intracranial 

abnormality by CT scan.  There are findings involving the subcortical white 

matter, which are most commonly seen in the setting of chronic microvascular 

ischemic change.  If there is further clinical concern for an acute intracranial

process, MRI could be considered as no comparison studies are available.  She 

has bilateral venous Doppler ultrasound of both lower extremities, showed no 

thrombus identified in the common femoral vein, superficial femoral vein, 

popliteal vein, or visualized _____ vein.  She has renal Duplex ultrasound which

showed notes no evidence of renal artery stenosis.



ASSESSMENT:

1.  In summary, this is a 41-year-old -American female patient who was 

admitted with hypertensive urgency.  She is now on nicardipine drip.  I started 

her back on her losartan.  I added Procardia, although I discovered that she was

on amlodipine before.  She was also on hydrochlorothiazide and spironolactone.

2.  Type 2 diabetes mellitus for which she is on Humalog and Lantus insulin.

3.  She has acute on chronic kidney disease.  Creatinine has risen from 1.8 to 

2.3, likely to a combination of  better control of blood pressure and exposure 

to contrast, as she had a CT angio of the chest.

4.  Bronchial asthma for which she is on Symbicort, albuterol and Singulair.

5.  Morbid obesity and obstructive sleep apnea.



PLAN:  My plan is to continue with nicardipine drip, add the Procardia and 

increase as needed and hopefully taper the nicardipine and discontinue it 

altogether.  I will consult the nephrologist to assist with management.







AURA/EDOUARD/NICK

DR: AMM/nts   DD: 04/25/2022 09:26

DT: 04/25/2022 12:04   TID: [id number]

## 2022-04-27 VITALS — SYSTOLIC BLOOD PRESSURE: 191 MMHG | DIASTOLIC BLOOD PRESSURE: 97 MMHG

## 2022-04-27 VITALS — SYSTOLIC BLOOD PRESSURE: 181 MMHG | DIASTOLIC BLOOD PRESSURE: 90 MMHG

## 2022-04-27 VITALS — SYSTOLIC BLOOD PRESSURE: 208 MMHG | DIASTOLIC BLOOD PRESSURE: 87 MMHG

## 2022-04-27 VITALS — DIASTOLIC BLOOD PRESSURE: 82 MMHG | SYSTOLIC BLOOD PRESSURE: 202 MMHG

## 2022-04-27 VITALS — DIASTOLIC BLOOD PRESSURE: 87 MMHG | SYSTOLIC BLOOD PRESSURE: 219 MMHG

## 2022-04-27 VITALS — SYSTOLIC BLOOD PRESSURE: 166 MMHG | DIASTOLIC BLOOD PRESSURE: 85 MMHG

## 2022-04-27 VITALS — SYSTOLIC BLOOD PRESSURE: 178 MMHG | DIASTOLIC BLOOD PRESSURE: 87 MMHG

## 2022-04-27 VITALS — DIASTOLIC BLOOD PRESSURE: 95 MMHG | SYSTOLIC BLOOD PRESSURE: 204 MMHG

## 2022-04-27 VITALS — DIASTOLIC BLOOD PRESSURE: 91 MMHG | SYSTOLIC BLOOD PRESSURE: 173 MMHG

## 2022-04-27 VITALS — DIASTOLIC BLOOD PRESSURE: 103 MMHG | SYSTOLIC BLOOD PRESSURE: 212 MMHG

## 2022-04-27 VITALS — SYSTOLIC BLOOD PRESSURE: 214 MMHG | DIASTOLIC BLOOD PRESSURE: 88 MMHG

## 2022-04-27 VITALS — DIASTOLIC BLOOD PRESSURE: 81 MMHG | SYSTOLIC BLOOD PRESSURE: 170 MMHG

## 2022-04-27 VITALS — SYSTOLIC BLOOD PRESSURE: 166 MMHG | DIASTOLIC BLOOD PRESSURE: 82 MMHG

## 2022-04-27 VITALS — SYSTOLIC BLOOD PRESSURE: 175 MMHG | DIASTOLIC BLOOD PRESSURE: 76 MMHG

## 2022-04-27 VITALS — SYSTOLIC BLOOD PRESSURE: 202 MMHG | DIASTOLIC BLOOD PRESSURE: 89 MMHG

## 2022-04-27 VITALS — DIASTOLIC BLOOD PRESSURE: 104 MMHG | SYSTOLIC BLOOD PRESSURE: 207 MMHG

## 2022-04-27 VITALS — DIASTOLIC BLOOD PRESSURE: 94 MMHG | SYSTOLIC BLOOD PRESSURE: 213 MMHG

## 2022-04-27 VITALS — SYSTOLIC BLOOD PRESSURE: 200 MMHG | DIASTOLIC BLOOD PRESSURE: 90 MMHG

## 2022-04-27 VITALS — SYSTOLIC BLOOD PRESSURE: 145 MMHG | DIASTOLIC BLOOD PRESSURE: 84 MMHG

## 2022-04-27 VITALS — SYSTOLIC BLOOD PRESSURE: 170 MMHG | DIASTOLIC BLOOD PRESSURE: 56 MMHG

## 2022-04-27 VITALS — DIASTOLIC BLOOD PRESSURE: 83 MMHG | SYSTOLIC BLOOD PRESSURE: 157 MMHG

## 2022-04-27 VITALS — SYSTOLIC BLOOD PRESSURE: 222 MMHG | DIASTOLIC BLOOD PRESSURE: 110 MMHG

## 2022-04-27 VITALS — SYSTOLIC BLOOD PRESSURE: 175 MMHG | DIASTOLIC BLOOD PRESSURE: 75 MMHG

## 2022-04-27 VITALS — SYSTOLIC BLOOD PRESSURE: 162 MMHG | DIASTOLIC BLOOD PRESSURE: 83 MMHG

## 2022-04-27 VITALS — SYSTOLIC BLOOD PRESSURE: 201 MMHG | DIASTOLIC BLOOD PRESSURE: 87 MMHG

## 2022-04-27 VITALS — DIASTOLIC BLOOD PRESSURE: 101 MMHG | SYSTOLIC BLOOD PRESSURE: 218 MMHG

## 2022-04-27 LAB
ANION GAP SERPL CALC-SCNC: 9 MMOL/L (ref 6–14)
BUN SERPL-MCNC: 36 MG/DL (ref 7–20)
CALCIUM SERPL-MCNC: 8.1 MG/DL (ref 8.5–10.1)
CHLORIDE SERPL-SCNC: 103 MMOL/L (ref 98–107)
CO2 SERPL-SCNC: 23 MMOL/L (ref 21–32)
CREAT SERPL-MCNC: 4.1 MG/DL (ref 0.6–1)
GFR SERPLBLD BASED ON 1.73 SQ M-ARVRAT: 12 ML/MIN
GLUCOSE SERPL-MCNC: 204 MG/DL (ref 70–99)
POTASSIUM SERPL-SCNC: 4.6 MMOL/L (ref 3.5–5.1)
SODIUM SERPL-SCNC: 135 MMOL/L (ref 136–145)

## 2022-04-27 PROCEDURE — B548ZZA ULTRASONOGRAPHY OF SUPERIOR VENA CAVA, GUIDANCE: ICD-10-PCS

## 2022-04-27 PROCEDURE — 02HV33Z INSERTION OF INFUSION DEVICE INTO SUPERIOR VENA CAVA, PERCUTANEOUS APPROACH: ICD-10-PCS

## 2022-04-27 RX ADMIN — INSULIN LISPRO SCH UNITS: 100 INJECTION, SOLUTION INTRAVENOUS; SUBCUTANEOUS at 17:51

## 2022-04-27 RX ADMIN — INSULIN LISPRO SCH UNITS: 100 INJECTION, SOLUTION INTRAVENOUS; SUBCUTANEOUS at 12:41

## 2022-04-27 RX ADMIN — Medication PRN APP: at 17:52

## 2022-04-27 RX ADMIN — ACETAMINOPHEN PRN MG: 325 TABLET, FILM COATED ORAL at 17:52

## 2022-04-27 RX ADMIN — ENOXAPARIN SODIUM SCH MG: 100 INJECTION SUBCUTANEOUS at 17:00

## 2022-04-27 RX ADMIN — INSULIN GLARGINE SCH UNIT: 100 INJECTION, SOLUTION SUBCUTANEOUS at 20:59

## 2022-04-27 RX ADMIN — BACITRACIN SCH MLS/HR: 5000 INJECTION, POWDER, FOR SOLUTION INTRAMUSCULAR at 10:48

## 2022-04-27 RX ADMIN — ASPIRIN SCH MG: 325 TABLET, DELAYED RELEASE ORAL at 09:09

## 2022-04-27 RX ADMIN — Medication PRN APP: at 10:48

## 2022-04-27 RX ADMIN — ATORVASTATIN CALCIUM SCH MG: 40 TABLET, FILM COATED ORAL at 20:54

## 2022-04-27 RX ADMIN — MONTELUKAST SODIUM SCH MG: 10 TABLET, FILM COATED ORAL at 09:09

## 2022-04-27 RX ADMIN — BACITRACIN SCH MLS/HR: 5000 INJECTION, POWDER, FOR SOLUTION INTRAMUSCULAR at 22:45

## 2022-04-27 RX ADMIN — ONDANSETRON PRN MG: 4 TABLET, ORALLY DISINTEGRATING ORAL at 12:37

## 2022-04-27 RX ADMIN — INSULIN LISPRO SCH UNITS: 100 INJECTION, SOLUTION INTRAVENOUS; SUBCUTANEOUS at 09:15

## 2022-04-27 RX ADMIN — SPIRONOLACTONE SCH MG: 25 TABLET ORAL at 09:09

## 2022-04-27 RX ADMIN — Medication PRN APP: at 12:37

## 2022-04-27 NOTE — PDOC
PROGRESS NOTES


Date of Service


DATE: 4/27/22 


TIME: 09:34


Assessment


Hypertensive encephalopathy, also has a small left lateral liset infarct 

extending to the left brachium pontis


Acute-on-chronic kidney injury, had CT angiogram of chest at [facility] 


History of diabetes, hypertension, hyperlipidemia, noncompliant, asthma, sleep 

apnea


Plan


Aspirin


High-dose statin


Treatment of hypertension.  Tighten parameters back to 150/90 starting today


Rehabilitation modalities


Await echocardiogram


Subjective


Still has left cheek numbness


Objective





Vital Signs








  Date Time  Temp Pulse Resp B/P (MAP) Pulse Ox O2 Delivery O2 Flow Rate FiO2


 


4/27/22 09:09  99  207/104    


 


4/27/22 06:00     97 Room Air  


 


4/27/22 04:00 98.8       





 98.8       


 


4/27/22 02:00   18     














Intake and Output 


 


 4/27/22





 07:00


 


Intake Total 2882.5 ml


 


Output Total 1135 ml


 


Balance 1747.5 ml


 


 


 


Intake Oral 1500 ml


 


IV Total 1382.5 ml


 


Output Urine Total 1135 ml


 


# Bowel Movements 1








PHYSICAL EXAM


Alert. Oriented to time, place and person.


PERRL.


EOMI.


CN: Slight left cheek sensory loss, otherwise no focal findings.


Muscle tone: normal.


Muscle strength: 5/


DTR: 2+


Plantar reflex: Flexor


Gait: not examined in bed.


Sensory exam: no abnormal findings.


No cerebellar signs elicited.


Review of Relevant


I have reviewed the following items lana (where applicable) has been applied.


Labs





Laboratory Tests








Test


 4/25/22


13:25 4/25/22


18:27 4/25/22


20:26 4/26/22


04:15


 


Glucose (Fingerstick)


 281 mg/dL


(70-99) 256 mg/dL


(70-99) 209 mg/dL


(70-99) 





 


Sodium Level


 


 


 


 134 mmol/L


(136-145)


 


Potassium Level


 


 


 


 4.1 mmol/L


(3.5-5.1)


 


Chloride Level


 


 


 


 102 mmol/L


()


 


Carbon Dioxide Level


 


 


 


 21 mmol/L


(21-32)


 


Anion Gap    11 (6-14) 


 


Blood Urea Nitrogen


 


 


 


 30 mg/dL


(7-20)


 


Creatinine


 


 


 


 3.6 mg/dL


(0.6-1.0)


 


Estimated GFR


(Cockcroft-Gault) 


 


 


 13.9 





 


BUN/Creatinine Ratio    8 (6-20) 


 


Glucose Level


 


 


 


 268 mg/dL


(70-99)


 


Calcium Level


 


 


 


 8.1 mg/dL


(8.5-10.1)


 


Total Bilirubin


 


 


 


 0.2 mg/dL


(0.2-1.0)


 


Aspartate Amino Transf


(AST/SGOT) 


 


 


 29 U/L (15-37) 





 


Alanine Aminotransferase


(ALT/SGPT) 


 


 


 122 U/L


(14-59)


 


Alkaline Phosphatase


 


 


 


 168 U/L


()


 


Total Protein


 


 


 


 6.9 g/dL


(6.4-8.2)


 


Albumin


 


 


 


 2.4 g/dL


(3.4-5.0)


 


Albumin/Globulin Ratio    0.5 (1.0-1.7) 


 


Test


 4/26/22


09:19 4/26/22


10:05 4/26/22


12:43 4/26/22


17:26


 


Glucose (Fingerstick)


 210 mg/dL


(70-99) 


 161 mg/dL


(70-99) 188 mg/dL


(70-99)


 


White Blood Count


 


 13.0 x10^3/uL


(4.0-11.0) 


 





 


Red Blood Count


 


 3.94 x10^6/uL


(3.50-5.40) 


 





 


Hemoglobin


 


 10.9 g/dL


(12.0-15.5) 


 





 


Hematocrit


 


 34.1 %


(36.0-47.0) 


 





 


Mean Corpuscular Volume  87 fL ()   


 


Mean Corpuscular Hemoglobin  28 pg (25-35)   


 


Mean Corpuscular Hemoglobin


Concent 


 32 g/dL


(31-37) 


 





 


Red Cell Distribution Width


 


 13.6 %


(11.5-14.5) 


 





 


Platelet Count


 


 362 x10^3/uL


(140-400) 


 





 


Sodium Level


 


 136 mmol/L


(136-145) 


 





 


Potassium Level


 


 3.9 mmol/L


(3.5-5.1) 


 





 


Chloride Level


 


 102 mmol/L


() 


 





 


Carbon Dioxide Level


 


 20 mmol/L


(21-32) 


 





 


Anion Gap  14 (6-14)   


 


Blood Urea Nitrogen


 


 30 mg/dL


(7-20) 


 





 


Creatinine


 


 3.6 mg/dL


(0.6-1.0) 


 





 


Estimated GFR


(Cockcroft-Gault) 


 13.9 


 


 





 


BUN/Creatinine Ratio  8 (6-20)   


 


Glucose Level


 


 202 mg/dL


(70-99) 


 





 


Calcium Level


 


 8.4 mg/dL


(8.5-10.1) 


 





 


Total Bilirubin


 


 0.2 mg/dL


(0.2-1.0) 


 





 


Aspartate Amino Transf


(AST/SGOT) 


 32 U/L (15-37) 


 


 





 


Alanine Aminotransferase


(ALT/SGPT) 


 120 U/L


(14-59) 


 





 


Alkaline Phosphatase


 


 151 U/L


() 


 





 


Total Protein


 


 6.9 g/dL


(6.4-8.2) 


 





 


Albumin


 


 2.4 g/dL


(3.4-5.0) 


 





 


Albumin/Globulin Ratio  0.5 (1.0-1.7)   


 


Test


 4/26/22


20:40 4/27/22


04:00 4/27/22


08:31 





 


Glucose (Fingerstick)


 171 mg/dL


(70-99) 


 189 mg/dL


(70-99) 





 


Sodium Level


 


 135 mmol/L


(136-145) 


 





 


Potassium Level


 


 4.6 mmol/L


(3.5-5.1) 


 





 


Chloride Level


 


 103 mmol/L


() 


 





 


Carbon Dioxide Level


 


 23 mmol/L


(21-32) 


 





 


Anion Gap  9 (6-14)   


 


Blood Urea Nitrogen


 


 36 mg/dL


(7-20) 


 





 


Creatinine


 


 4.1 mg/dL


(0.6-1.0) 


 





 


Estimated GFR


(Cockcroft-Gault) 


 12.0 


 


 





 


Glucose Level


 


 204 mg/dL


(70-99) 


 





 


Calcium Level


 


 8.1 mg/dL


(8.5-10.1) 


 











Laboratory Tests








Test


 4/26/22


10:05 4/26/22


12:43 4/26/22


17:26 4/26/22


20:40


 


White Blood Count


 13.0 x10^3/uL


(4.0-11.0) 


 


 





 


Red Blood Count


 3.94 x10^6/uL


(3.50-5.40) 


 


 





 


Hemoglobin


 10.9 g/dL


(12.0-15.5) 


 


 





 


Hematocrit


 34.1 %


(36.0-47.0) 


 


 





 


Mean Corpuscular Volume 87 fL ()    


 


Mean Corpuscular Hemoglobin 28 pg (25-35)    


 


Mean Corpuscular Hemoglobin


Concent 32 g/dL


(31-37) 


 


 





 


Red Cell Distribution Width


 13.6 %


(11.5-14.5) 


 


 





 


Platelet Count


 362 x10^3/uL


(140-400) 


 


 





 


Sodium Level


 136 mmol/L


(136-145) 


 


 





 


Potassium Level


 3.9 mmol/L


(3.5-5.1) 


 


 





 


Chloride Level


 102 mmol/L


() 


 


 





 


Carbon Dioxide Level


 20 mmol/L


(21-32) 


 


 





 


Anion Gap 14 (6-14)    


 


Blood Urea Nitrogen


 30 mg/dL


(7-20) 


 


 





 


Creatinine


 3.6 mg/dL


(0.6-1.0) 


 


 





 


Estimated GFR


(Cockcroft-Gault) 13.9 


 


 


 





 


BUN/Creatinine Ratio 8 (6-20)    


 


Glucose Level


 202 mg/dL


(70-99) 


 


 





 


Calcium Level


 8.4 mg/dL


(8.5-10.1) 


 


 





 


Total Bilirubin


 0.2 mg/dL


(0.2-1.0) 


 


 





 


Aspartate Amino Transf


(AST/SGOT) 32 U/L (15-37) 


 


 


 





 


Alanine Aminotransferase


(ALT/SGPT) 120 U/L


(14-59) 


 


 





 


Alkaline Phosphatase


 151 U/L


() 


 


 





 


Total Protein


 6.9 g/dL


(6.4-8.2) 


 


 





 


Albumin


 2.4 g/dL


(3.4-5.0) 


 


 





 


Albumin/Globulin Ratio 0.5 (1.0-1.7)    


 


Glucose (Fingerstick)


 


 161 mg/dL


(70-99) 188 mg/dL


(70-99) 171 mg/dL


(70-99)


 


Test


 4/27/22


04:00 4/27/22


08:31 


 





 


Sodium Level


 135 mmol/L


(136-145) 


 


 





 


Potassium Level


 4.6 mmol/L


(3.5-5.1) 


 


 





 


Chloride Level


 103 mmol/L


() 


 


 





 


Carbon Dioxide Level


 23 mmol/L


(21-32) 


 


 





 


Anion Gap 9 (6-14)    


 


Blood Urea Nitrogen


 36 mg/dL


(7-20) 


 


 





 


Creatinine


 4.1 mg/dL


(0.6-1.0) 


 


 





 


Estimated GFR


(Cockcroft-Gault) 12.0 


 


 


 





 


Glucose Level


 204 mg/dL


(70-99) 


 


 





 


Calcium Level


 8.1 mg/dL


(8.5-10.1) 


 


 





 


Glucose (Fingerstick)


 


 189 mg/dL


(70-99) 


 











Medications





Current Medications


Nicardipine HCl 50 mg/Sodium Chloride 250 ml @  25 mls/hr CONT  PRN IV PER 

PROTOCOL Last administered on 4/25/22at 15:53;  Start 4/24/22 at 21:45;  Stop 

4/25/22 at 16:43;  Status DC


Nifedipine (Procardia Xl) 30 mg DAILY PO  Last administered on 4/27/22at 09:09; 

Start 4/25/22 at 09:00


Zolpidem Tartrate (Ambien) 5 mg PRN QHS  PRN PO INSOMNIA;  Start 4/24/22 at 

21:45


Montelukast Sodium (Singulair) 10 mg DAILY PO  Last administered on 4/27/22at 

09:09;  Start 4/25/22 at 09:00


Insulin Human Lispro (HumaLOG) 15 units TIDWMEALS SQ  Last administered on 

4/25/22at 18:30;  Start 4/25/22 at 08:00;  Stop 4/26/22 at 08:47;  Status DC


Insulin Glargine (Lantus Syringe) 40 unit QHS SQ  Last administered on 4/26/22at

20:37;  Start 4/25/22 at 21:00


Losartan Potassium (Cozaar) 100 mg DAILY PO  Last administered on 4/26/22at 

09:28;  Start 4/25/22 at 09:00;  Stop 4/26/22 at 10:31;  Status DC


Spironolactone (Aldactone) 50 mg DAILY PO  Last administered on 4/27/22at 09:09;

 Start 4/25/22 at 09:00


Insulin Glargine (Lantus Syringe) 40 unit 1X  ONCE SQ  Last administered on 

4/24/22at 22:21;  Start 4/24/22 at 23:00;  Stop 4/24/22 at 23:01;  Status DC


Sodium Chloride 1,000 ml @  75 mls/hr [id number] IV  Last administered on 4/26/22at

20:36;  Start 4/25/22 at 15:15


Nicardipine HCl 50 mg/Sodium Chloride 250 ml @  25 mls/hr CONT PRN  PRN IV PER 

PROTOCOL Last administered on 4/25/22at 21:31;  Start 4/25/22 at 16:45


Atorvastatin Calcium (Lipitor) 80 mg QHS PO  Last administered on 4/26/22at 

20:35;  Start 4/25/22 at 21:00


Acetaminophen (Tylenol) 650 mg PRN Q6HRS  PRN PO MILD PAIN / TEMP > 100.3'F Last

administered on 4/26/22at 23:51;  Start 4/25/22 at 16:45


Aspirin (Ecotrin) 325 mg DAILYWBKFT PO  Last administered on 4/27/22at 09:09;  

Start 4/25/22 at 17:00


Aspirin (Aspirin Rectal Supp) 300 mg PRN DAILY  PRN PA IF UNABLE TO TAKE PO;  

Start 4/25/22 at 16:45


Insulin Human Lispro (HumaLOG) 20 units TIDWMEALS SQ  Last administered on 

4/27/22at 09:15;  Start 4/26/22 at 08:45


Ondansetron HCl (Zofran Odt) 4 mg PRN Q6HRS  PRN PO NAUSEA/VOMITING Last 

administered on 4/26/22at 23:56;  Start 4/26/22 at 15:00


Enoxaparin Sodium (Lovenox Per Pharmacy Prophylaxis Dosing) 1 each PRN DAILY  

PRN MC SEE COMMENTS;  Start 4/26/22 at 16:00


Enoxaparin Sodium (Lovenox 60mg Syringe) 60 mg Q24H SQ  Last administered on 

4/26/22at 17:41;  Start 4/26/22 at 17:00





Active Scripts


Active


Levemir Flextouch (Insulin Detemir) 100 Unit/1 Ml Insuln.pen 40 Units SQ QHS 30 

Days


Novolog Flexpen (Insulin Aspart) 100 Unit/1 Ml Insuln.pen 15 Units SQ TIDAC 30 

Days


Reported


Tri-Sprintec (Norgestimate-Ethinyl Estradiol) 1 Each Tablet 1 Tab PO DAILY


Spironolactone 50 Mg Tablet 1 Tab PO DAILY


Montelukast Sodium Tablet  ** (Montelukast Sodium) 10 Mg Tablet 10 Mg PO DAILY


Losartan Potassium 100 Mg Tablet 100 Mg PO DAILY


Vitals/I & O





Vital Sign - Last 24 Hours








 4/26/22 4/26/22 4/26/22 4/26/22





 10:00 11:00 12:00 12:00


 


Temp   98.8 





   98.8 


 


Pulse 96 94 92 


 


Resp 18 18 18 


 


B/P (MAP) 193/90 200/83 214/91 


 


Pulse Ox 96 99 98 


 


O2 Delivery Room Air Room Air Room Air Room Air


 


    





    





 4/26/22 4/26/22 4/26/22 4/26/22





 13:00 14:00 15:00 16:00


 


Pulse 94 96 98 


 


Resp 18 18 18 


 


B/P (MAP) 194/84 194/82 174/78 


 


Pulse Ox 97 97 97 


 


O2 Delivery Room Air Room Air Room Air Room Air





 4/26/22 4/26/22 4/26/22 4/26/22





 16:00 17:00 18:00 19:00


 


Temp 98.9   





 98.9   


 


Pulse 96 98 98 100


 


Resp 18 18 18 18


 


B/P (MAP) 188/77 182/83 166/82 170/89


 


Pulse Ox 95 97  


 


O2 Delivery Room Air Room Air Room Air Room Air


 


    





    





 4/26/22 4/26/22 4/26/22 4/26/22





 20:00 20:00 21:00 22:00


 


Temp  99.1  





  99.1  


 


Pulse  98 98 100


 


Resp  18 18 18


 


B/P (MAP)  178/75 182/88 176/77


 


Pulse Ox  98  


 


O2 Delivery Room Air Room Air Room Air Room Air


 


    





    





 4/26/22 4/26/22 4/27/22 4/27/22





 23:00 23:51 00:00 01:00


 


Temp   98.9 





   98.9 


 


Pulse 100  98 96


 


Resp 18  18 18


 


B/P (MAP)   202/82 208/87


 


Pulse Ox 99   100


 


O2 Delivery Room Air Room Air Room Air Room Air


 


    





    





 4/27/22 4/27/22 4/27/22 4/27/22





 02:00 03:00 04:00 04:00


 


Temp   98.8 





   98.8 


 


Pulse 95 94 98 


 


Resp 18   


 


B/P (MAP) 218/101 201/87 202/89 


 


Pulse Ox 95 99 99 


 


O2 Delivery Room Air Room Air Room Air Room Air


 


    





    





 4/27/22 4/27/22 4/27/22 4/27/22





 05:00 05:10 06:00 09:09


 


Pulse 96  96 99


 


B/P (MAP) 222/110 219/87 214/88 207/104


 


Pulse Ox 99  97 


 


O2 Delivery Room Air  Room Air 














Intake and Output   


 


 4/26/22 4/26/22 4/27/22





 15:00 23:00 07:00


 


Intake Total 500 ml 1126 ml 1256.5 ml


 


Output Total 400 ml 450 ml 285 ml


 


Balance 100 ml 676 ml 971.5 ml











Justicifation of Admission Dx:


Justifications for Admission:


Justification of Admission Dx:  Yes


Stroke - Ischemic:  Stroke-Ischemic











ANGÉLICA MOYER MD           Apr 27, 2022 09:36

## 2022-04-27 NOTE — CARD
MR#: [id number]

Account#: [id number]

Accession#: [id number]

Date of Study: 04/27/2022

Ordering Physician: ANGÉLICA MOYER, 

Referring Physician: ANGÉLICA MOYER 

Tech: [facility]





--------------- APPROVED REPORT --------------





EXAM: Two-dimensional and M-mode echocardiogram with Doppler and color Doppler.



Other Information 

Quality : LimitedHR: 97bpm

Rhythm : NSRTechnically limited study due to  body habitus.



INDICATION

CVA/TIA 



Echo Enhancing Agent

Indication: Rule Out Septal Defect

Agent/Amount Used: Agitated Saline 8mL



RISK FACTORS

Hypertension 

Obesity   



2D DIMENSIONS 

Left Atrium(2D)4.4 (1.6-4.0cm)IVSd1.3 (0.7-1.1cm)

Aortic Root(2D)2.9 (2.0-3.7cm)LVDd4.8 (3.9-5.9cm)

LVOT Diameter2.0 (1.8-2.4cm)PWd1.4 (0.7-1.1cm)

LA Kbkeru25 (18-58mL)LVDs3.3 (2.5-4.0cm)

FS (%) 31.9 %SV64.9 ml

LVEF(%)59.8 (>50%)



Aortic Valve

AoV Peak Bry.160.9cm/sAoV VTI28.0cm

AO Peak GR.10.4mmHgLVOT  VTI 17.84cm

AO Mean GR.7mmHg



Mitral Valve

MV E Ksljsyhr073.8cm/sMV DECEL RUXA57lw

MV A Wgmcxlma69.2cm/sE/A  Ratio1.4



TDI

Lateral E' P. V7.27cm/sMedial E' P. V6.11cm/s

E/Lateral E'15.4E/Medial E'18.3



Pulmonary Valve

PV Peak Oibrqvsd018.2cm/s



Tricuspid Valve

TR P. Qvgzarbw364oj/sTR Peak Gr.30mmHg



Pulmonary Vein

S1 Ygxoezfk66.0cm/sS2 Whmlvjip61.38cm/s

D2 Afclnqeg16.4cm/s



 LEFT VENTRICLE 

The left ventricle is normal size. There is mild to moderate concentric left ventricular hypertrophy.
 The left ventricular systolic function is normal. The ejection fraction estimated at 55 to 60%. Ther
e is normal LV segmental wall motion. Transmitral Doppler flow pattern is Grade II-pseudonormal filli
ng dynamics. No left ventricle thrombus noted on this study. There is no ventricular septal defect vi
sualized. There is no left ventricular aneurysm. There is no mass noted in the left ventricle.



 RIGHT VENTRICLE 

The right ventricle is normal size. There is normal right ventricular wall thickness. The right ventr
icular systolic function is normal.



 ATRIA 

The left atrium is mildly dilated. The right atrium size is normal. The interatrial septum is intact 
with no evidence for an atrial septal defect or patent foramen ovale as noted on 2-D or Doppler imagi
ng. Technically difficult study but saline bubble study appreared negative for pfo or asd.



 AORTIC VALVE 

The aortic valve is not well seen. Doppler and Color Flow revealed no significant aortic regurgitatio
n. There is no significant aortic valvular stenosis. There is no aortic valvular vegetation.



 MITRAL VALVE 

The mitral valve is normal in structure and function. There is no evidence of mitral valve prolapse. 
There is no mitral valve stenosis. Doppler and Color-flow revealed trace to mild mitral regurgitation
.



 TRICUSPID VALVE 

The tricuspid valve is normal in structure and function. Doppler and Color Flow revealed trace tricus
pid regurgitation. There is no tricuspid valve prolapse or vegetation. There is no tricuspid valve st
enosis.



 PULMONIC VALVE 

The pulmonary valve is normal in structure and function. Doppler and Color Flow revealed no pulmonic 
valvular regurgitation. There is no pulmonic valvular stenosis.



 GREAT VESSELS 

The aortic root is normal in size. The ascending aorta is normal in size. The pulmonary artery is nor
mal. The IVC is normal in size and collapses >50% with inspiration.



 PERICARDIAL EFFUSION 

There is no pleural effusion. There is no evidence of significant pericardial effusion.



Critical Notification

Critical Value: No



<Conclusion>

The left ventricular systolic function is normal.

The ejection fraction estimated at 55 to 60%.

There is normal LV segmental wall motion.

Transmitral Doppler flow pattern is Grade II-pseudonormal filling dynamics.

Trace to mild mitral regurgitation.

Trace tricuspid regurgitation.

There is no evidence of significant pericardial effusion.

Bubble study technically difficult but appears to be negative for PFO/ASD.  Recommend ROBERTO if clinical
 suspicion high.





Signed by : Willard Bee, 

Electronically Approved : 04/27/2022 16:26:28

## 2022-04-27 NOTE — RAD
EXAM: CHEST ONE VIEW.



HISTORY: Line placement.



COMPARISON: None.



FINDINGS: A frontal view of the chest is obtained. A right internal jugular hemodialysis catheter has
 its tip in the superior cavoatrial junction.



A mild right basilar airspace opacity cannot be excluded. There is no pneumothorax or pleural effusio
n. The heart is not enlarged.



IMPRESSION: 

1. Correlate for mild right basilar infiltrate.



Electronically signed by: LAVELL Vasquez MD (4/27/2022 12:39 PM) [id number]

## 2022-04-27 NOTE — NUR
end of shift note



pt alert and oriented throughout shift. equal  strength in both hands. nsr on tele 
monitor. bp elevated. One blood pressure greater than 220, however, upon rechecking blood 
pressure, it was decreased below 220, so nicardipine gtt not initiated. patient up in chair 
at beginning of shift. was assisted to bed with two RNs present, a walker, and a gait belt. 
patient complained of generalized pain one time this shift and prn tylenol given which 
helped patient pain. assessments completed per chart. medications given emar. patient 
currently in bed with call light in reach.

## 2022-04-27 NOTE — RAD
Procedure: Temporary hemodialysis catheter placement  

 

 

Sterility: All elements of maximal sterile barrier technique including the use of a cap, mask, steril
e gown, sterile gloves, large sterile sheet, appropriate hand hygiene, and 2% chlorhexidine for cutan
eous antisepsis (or acceptable alternative antiseptic per current guidelines) were followed for this 
procedure.



Consent: The procedure was explained in its entirety to the patient or the patients designated repres
entative by a member of the treatment team, including a discussion of the risks, benefits and commonl
y accepted alternatives to the procedure, as well as the expected consequences of no therapy whatsoev
er.   Discussion of the risks included, but was not limited to, those that are most frequent and thos
e that are rare but possibly severe or life-threatening, as well as the possibility of unforeseen com
plications.



Technique and Findings: Following informed consent, the patient was prepped and draped in the usual s
terile fashion.  Ultrasound interrogation of the right neck revealed patency and compressibility of t
he right internal jugular vein.  A 21-gauge micropuncture was then used to gain access to this vein u
nder ultrasound guidance.  A hard copy ultrasound image was recorded. A guidewire was advanced centra
lly over which, following dilatation,  a  temporary dialysis catheter was placed. The new catheter wa
s found to flush and aspirate normally. The catheter was secured in place. Sterile dressings were kelly
lied. No immediate complications were identified.



IMPRESSION: Placement of a temporary dialysis catheter  

 

 

 



Electronically signed by: Luis Alberto Fuentes MD (4/27/2022 2:17 PM) [id number]

## 2022-04-27 NOTE — PDOC
DATE OF SERVICE


DATE: 4/27/22 


TIME: 14:55





SUBJECTIVE


ROS


 C/O some nausea. Denies SOB





OBJECTIVE


Vital Signs





Vital Signs








  Date Time  Temp Pulse Resp B/P (MAP) Pulse Ox O2 Delivery O2 Flow Rate FiO2


 


4/27/22 12:13      Room Air  


 


4/27/22 09:09  99  207/104    


 


4/27/22 06:00     97   


 


4/27/22 04:00 98.8       





 98.8       


 


4/27/22 02:00   18     








I & 0











Intake and Output 


 


 4/27/22





 07:00


 


Intake Total 2882.5 ml


 


Output Total 1135 ml


 


Balance 1747.5 ml


 


 


 


Intake Oral 1500 ml


 


IV Total 1382.5 ml


 


Output Urine Total 1135 ml


 


# Bowel Movements 1











PHYSICAL EXAM


Physical Exam





GENERAL:  Morbidly Obese , Propped up in bed. NAD 


HEEN  Normocephalic, atraumatic.om mildly dry  


NECK:  Supple.


HEART:   normal first and second heart sounds.  No gallop, rub or murmur.


LUNGS :  Clear to auscultation, decreased at bases, non labored  


ABDOMEN:  Distended, soft, nontender.


NEUROLOGIC:  Grossly Normal, Moves all 4 extremities .  She did complain of 

numbness in her left side of the face without any obvious motor deficit.


PSYCH COOPERATIVE 


EXT No LE  edema  


 No Malhotra, No CVA or SP tenderness  


DERM No Rash





DIAGNOSIS/ASSESSMENT


Assessment & Plan





MICHELLE -atn   / Vomiting  /Poor po intake/ IV Contrast / Hx of NSAID use   . 

Baseline Cr normal in 2017 per [facility] records. No Interval labs available  .UA 

unremarkable, US Unremarkable, worsening renal function, Malhotra placed with 400 

mls or Urine; Non Oliguric 


 Supportive care,  Holding   Aldactone and   ARB   . Probably will need RRT if 

worsening renal function  . Re-Eval in am . Dw patient and staff  . If taking PO

can dc IVF , no improvement in renal function 





CKD - per Primary's note, No interval labs. Suspect CKD 2/2 DM and Uncontrolled 

HTN 





HTN Urgency   -   multiple antihypertensive medications including clonidine, 

hydralazine  metoprolol without improvement.  . Renal Duplex Normal  Tighten 

parameters back to 150/90 starting today per neurology  





Hypertensive encephalopathy, also has a small left lateral liset infarct 

extending to the left brachium pontis- NEUROLOGY FOLLOWING  . 





Type 2 Diabetes mellitus. she stopped Insulin on her own recently  





Bronchial asthma





COMMENT/RELEVANT DATA


Meds





Current Medications








 Medications


  (Trade)  Dose


 Ordered  Sig/Kavitha  Start Time


 Stop Time Status Last Admin


Dose Admin


 


 Acetaminophen


  (Tylenol)  650 mg  PRN Q6HRS  PRN  4/25/22 16:45


    4/26/22 23:51


650 MG


 


 Aspirin


  (Aspirin Rectal


 Supp)  300 mg  PRN DAILY  PRN  4/25/22 16:45


     





 


 Aspirin


  (Ecotrin)  325 mg  DAILYWBKFT  4/25/22 17:00


    4/27/22 09:09


325 MG


 


 Atorvastatin


 Calcium


  (Lipitor)  80 mg  QHS  4/25/22 21:00


    4/26/22 20:35


80 MG


 


 Enoxaparin Sodium


  (Lovenox 60mg


 Syringe)  60 mg  Q24H  4/26/22 17:00


    4/26/22 17:41


60 MG


 


 Enoxaparin Sodium


  (Lovenox Per


 Pharmacy


 Prophylaxis


 Dosing)  1 each  PRN DAILY  PRN  4/26/22 16:00


     





 


 Insulin Glargine


  (Lantus Syringe)  40 unit  1X  ONCE  4/24/22 23:00


 4/24/22 23:01 DC 4/24/22 22:21


40 UNIT


 


 Insulin Human


 Lispro


  (HumaLOG)  20 units  TIDWMEALS  4/26/22 08:45


    4/27/22 12:41


20 UNITS


 


 Lidocaine HCl


  (Buffered


 Lidocaine 1%)  6 ml  1X  ONCE  4/27/22 12:15


 4/27/22 12:16 DC  





 


 Losartan Potassium


  (Cozaar)  100 mg  DAILY  4/25/22 09:00


 4/26/22 10:31 DC 4/26/22 09:28


100 MG


 


 Montelukast Sodium


  (Singulair)  10 mg  DAILY  4/25/22 09:00


    4/27/22 09:09


10 MG


 


 Nicardipine HCl


 50 mg/Sodium


 Chloride  250 ml @ 


 25 mls/hr  CONT PRN  PRN  4/25/22 16:45


    4/27/22 10:38


25 MLS/HR


 


 Nifedipine


  (Procardia Xl)  30 mg  DAILY  4/25/22 09:00


    4/27/22 09:09


30 MG


 


 Ondansetron HCl


  (Zofran Odt)  4 mg  PRN Q6HRS  PRN  4/26/22 15:00


    4/27/22 12:37


4 MG


 


 Ondansetron HCl


  (Zofran)  4 mg  PRN Q4HRS  PRN  4/27/22 10:15


     





 


 Sodium Chloride


  (Ayr Saline


 Nasal)  1 kelly  PRN DAILY  PRN  4/27/22 10:15


    4/27/22 12:37


1 KELLY


 


 Spironolactone


  (Aldactone)  50 mg  DAILY  4/25/22 09:00


    4/27/22 09:09


50 MG


 


 Zolpidem Tartrate


  (Ambien)  5 mg  PRN QHS  PRN  4/24/22 21:45


     











Lab





Laboratory Tests








Test


 4/26/22


17:26 4/26/22


20:40 4/27/22


04:00 4/27/22


08:31


 


Glucose (Fingerstick)


 188 mg/dL


(70-99) 171 mg/dL


(70-99) 


 189 mg/dL


(70-99)


 


Sodium Level


 


 


 135 mmol/L


(136-145) 





 


Potassium Level


 


 


 4.6 mmol/L


(3.5-5.1) 





 


Chloride Level


 


 


 103 mmol/L


() 





 


Carbon Dioxide Level


 


 


 23 mmol/L


(21-32) 





 


Anion Gap   9 (6-14)  


 


Blood Urea Nitrogen


 


 


 36 mg/dL


(7-20) 





 


Creatinine


 


 


 4.1 mg/dL


(0.6-1.0) 





 


Estimated GFR


(Cockcroft-Gault) 


 


 12.0 


 





 


Glucose Level


 


 


 204 mg/dL


(70-99) 





 


Calcium Level


 


 


 8.1 mg/dL


(8.5-10.1) 





 


Test


 4/27/22


12:34 


 


 





 


Glucose (Fingerstick)


 187 mg/dL


(70-99) 


 


 











Results


All relevant outside records, renal labs, imaging studies, telemetry/EKG's were 

reviewed.





Justicifation of Admission Dx:


Justifications for Admission:


Justification of Admission Dx:  Yes


Stroke - Ischemic:  Stroke-Ischemic











RAMESH LITTLEJOHN MD                Apr 27, 2022 15:00

## 2022-04-27 NOTE — PDOC
DATE OF SERVICE


DATE: 4/27/22 


TIME: 10:07





SUBJECTIVE


ROS


Feeling Tired. No N/V. 


Voided x 1





OBJECTIVE


Vital Signs





Vital Signs








  Date Time  Temp Pulse Resp B/P (MAP) Pulse Ox O2 Delivery O2 Flow Rate FiO2


 


4/27/22 09:09  99  207/104    


 


4/27/22 06:00     97 Room Air  


 


4/27/22 04:00 98.8       





 98.8       


 


4/27/22 02:00   18     








I & 0











Intake and Output 


 


 4/27/22





 07:00


 


Intake Total 2882.5 ml


 


Output Total 1135 ml


 


Balance 1747.5 ml


 


 


 


Intake Oral 1500 ml


 


IV Total 1382.5 ml


 


Output Urine Total 1135 ml


 


# Bowel Movements 1











PHYSICAL EXAM


Physical Exam


GEN:    Awake, Oriented x [], In [] distress


EYES:  Vision Unchanged, Conjunctiva Normal


EN:      No EN Drainage, Mucous Membranes []


NECK:  [] JVD, [] JVP, Supple, [] Thyromegaly


CVS:    S1S2, [] Murmur, No Gallop, No Rub,[] Edema


RESP:  [] Rales, [] Rhonchi,[] Acc. Muscle Use


GI:        BS + ve, NO Bruit, Non Tender, Non Distended


:      [] CVA tenderness, [] Suprapubic Tenderness





DIAGNOSIS/ASSESSMENT


Assessment & Plan


ESRD/ARF:      Current fluid and E-lyte status does not necessitate emergent 

need for dialysis.  Will re-evaluate for dialysis in the am and continue on [] 

schedule.





ANEMIA;   [] Aranap as ordered, [] Transfuse [] with next HD as needed





HTN:   Current BP meds as reviewed.  See orders for changes.





BONE & MINERAL:   []





Discussed Plan of Care with family [] at bedside [] over the phone





COMMENT/RELEVANT DATA


Meds





Current Medications








 Medications


  (Trade)  Dose


 Ordered  Sig/Kavitha  Start Time


 Stop Time Status Last Admin


Dose Admin


 


 Acetaminophen


  (Tylenol)  650 mg  PRN Q6HRS  PRN  4/25/22 16:45


    4/26/22 23:51


650 MG


 


 Aspirin


  (Aspirin Rectal


 Supp)  300 mg  PRN DAILY  PRN  4/25/22 16:45


     





 


 Aspirin


  (Ecotrin)  325 mg  DAILYWBKFT  4/25/22 17:00


    4/27/22 09:09


325 MG


 


 Atorvastatin


 Calcium


  (Lipitor)  80 mg  QHS  4/25/22 21:00


    4/26/22 20:35


80 MG


 


 Enoxaparin Sodium


  (Lovenox 60mg


 Syringe)  60 mg  Q24H  4/26/22 17:00


    4/26/22 17:41


60 MG


 


 Enoxaparin Sodium


  (Lovenox Per


 Pharmacy


 Prophylaxis


 Dosing)  1 each  PRN DAILY  PRN  4/26/22 16:00


     





 


 Insulin Glargine


  (Lantus Syringe)  40 unit  1X  ONCE  4/24/22 23:00


 4/24/22 23:01 DC 4/24/22 22:21


40 UNIT


 


 Insulin Human


 Lispro


  (HumaLOG)  20 units  TIDWMEALS  4/26/22 08:45


    4/27/22 09:15


20 UNITS


 


 Losartan Potassium


  (Cozaar)  100 mg  DAILY  4/25/22 09:00


 4/26/22 10:31 DC 4/26/22 09:28


100 MG


 


 Montelukast Sodium


  (Singulair)  10 mg  DAILY  4/25/22 09:00


    4/27/22 09:09


10 MG


 


 Nicardipine HCl


 50 mg/Sodium


 Chloride  250 ml @ 


 25 mls/hr  CONT PRN  PRN  4/25/22 16:45


    4/25/22 21:31


12.5 MLS/HR


 


 Nifedipine


  (Procardia Xl)  30 mg  DAILY  4/25/22 09:00


    4/27/22 09:09


30 MG


 


 Ondansetron HCl


  (Zofran Odt)  4 mg  PRN Q6HRS  PRN  4/26/22 15:00


    4/26/22 23:56


4 MG


 


 Ondansetron HCl


  (Zofran)  4 mg  Q4H  PRN  4/27/22 10:15


   UNV  





 


 Sodium Chloride


  (Ayr Saline


 Nasal)  1 kelly  PRN DAILY  PRN  4/27/22 10:15


   UNV  





 


 Spironolactone


  (Aldactone)  50 mg  DAILY  4/25/22 09:00


    4/27/22 09:09


50 MG


 


 Zolpidem Tartrate


  (Ambien)  5 mg  PRN QHS  PRN  4/24/22 21:45


     











Lab





Laboratory Tests








Test


 4/26/22


12:43 4/26/22


17:26 4/26/22


20:40 4/27/22


04:00


 


Glucose (Fingerstick)


 161 mg/dL


(70-99) 188 mg/dL


(70-99) 171 mg/dL


(70-99) 





 


Sodium Level


 


 


 


 135 mmol/L


(136-145)


 


Potassium Level


 


 


 


 4.6 mmol/L


(3.5-5.1)


 


Chloride Level


 


 


 


 103 mmol/L


()


 


Carbon Dioxide Level


 


 


 


 23 mmol/L


(21-32)


 


Anion Gap    9 (6-14) 


 


Blood Urea Nitrogen


 


 


 


 36 mg/dL


(7-20)


 


Creatinine


 


 


 


 4.1 mg/dL


(0.6-1.0)


 


Estimated GFR


(Cockcroft-Gault) 


 


 


 12.0 





 


Glucose Level


 


 


 


 204 mg/dL


(70-99)


 


Calcium Level


 


 


 


 8.1 mg/dL


(8.5-10.1)


 


Test


 4/27/22


08:31 


 


 





 


Glucose (Fingerstick)


 189 mg/dL


(70-99) 


 


 











Results


All relevant outside records, renal labs, imaging studies, telemetry/EKG's were 

reviewed.





Justicifation of Admission Dx:


Justifications for Admission:


Justification of Admission Dx:  Yes


Stroke - Ischemic:  Stroke-Ischemic











RAMESH LITTLEJOHN MD                Apr 27, 2022 10:07

## 2022-04-28 VITALS — DIASTOLIC BLOOD PRESSURE: 78 MMHG | SYSTOLIC BLOOD PRESSURE: 179 MMHG

## 2022-04-28 VITALS — DIASTOLIC BLOOD PRESSURE: 84 MMHG | SYSTOLIC BLOOD PRESSURE: 174 MMHG

## 2022-04-28 VITALS — DIASTOLIC BLOOD PRESSURE: 76 MMHG | SYSTOLIC BLOOD PRESSURE: 178 MMHG

## 2022-04-28 VITALS — DIASTOLIC BLOOD PRESSURE: 76 MMHG | SYSTOLIC BLOOD PRESSURE: 196 MMHG

## 2022-04-28 VITALS — DIASTOLIC BLOOD PRESSURE: 73 MMHG | SYSTOLIC BLOOD PRESSURE: 142 MMHG

## 2022-04-28 VITALS — DIASTOLIC BLOOD PRESSURE: 74 MMHG | SYSTOLIC BLOOD PRESSURE: 157 MMHG

## 2022-04-28 VITALS — SYSTOLIC BLOOD PRESSURE: 211 MMHG | DIASTOLIC BLOOD PRESSURE: 83 MMHG

## 2022-04-28 VITALS — SYSTOLIC BLOOD PRESSURE: 141 MMHG | DIASTOLIC BLOOD PRESSURE: 62 MMHG

## 2022-04-28 VITALS — SYSTOLIC BLOOD PRESSURE: 202 MMHG | DIASTOLIC BLOOD PRESSURE: 92 MMHG

## 2022-04-28 VITALS — DIASTOLIC BLOOD PRESSURE: 66 MMHG | SYSTOLIC BLOOD PRESSURE: 175 MMHG

## 2022-04-28 VITALS — SYSTOLIC BLOOD PRESSURE: 178 MMHG | DIASTOLIC BLOOD PRESSURE: 76 MMHG

## 2022-04-28 VITALS — SYSTOLIC BLOOD PRESSURE: 191 MMHG | DIASTOLIC BLOOD PRESSURE: 81 MMHG

## 2022-04-28 VITALS — SYSTOLIC BLOOD PRESSURE: 154 MMHG | DIASTOLIC BLOOD PRESSURE: 68 MMHG

## 2022-04-28 VITALS — SYSTOLIC BLOOD PRESSURE: 147 MMHG | DIASTOLIC BLOOD PRESSURE: 65 MMHG

## 2022-04-28 VITALS — SYSTOLIC BLOOD PRESSURE: 165 MMHG | DIASTOLIC BLOOD PRESSURE: 72 MMHG

## 2022-04-28 VITALS — SYSTOLIC BLOOD PRESSURE: 171 MMHG | DIASTOLIC BLOOD PRESSURE: 81 MMHG

## 2022-04-28 VITALS — DIASTOLIC BLOOD PRESSURE: 70 MMHG | SYSTOLIC BLOOD PRESSURE: 155 MMHG

## 2022-04-28 VITALS — DIASTOLIC BLOOD PRESSURE: 72 MMHG | SYSTOLIC BLOOD PRESSURE: 141 MMHG

## 2022-04-28 VITALS — SYSTOLIC BLOOD PRESSURE: 170 MMHG | DIASTOLIC BLOOD PRESSURE: 80 MMHG

## 2022-04-28 VITALS — SYSTOLIC BLOOD PRESSURE: 144 MMHG | DIASTOLIC BLOOD PRESSURE: 72 MMHG

## 2022-04-28 VITALS — DIASTOLIC BLOOD PRESSURE: 64 MMHG | SYSTOLIC BLOOD PRESSURE: 119 MMHG

## 2022-04-28 VITALS — DIASTOLIC BLOOD PRESSURE: 70 MMHG | SYSTOLIC BLOOD PRESSURE: 185 MMHG

## 2022-04-28 VITALS — DIASTOLIC BLOOD PRESSURE: 78 MMHG | SYSTOLIC BLOOD PRESSURE: 151 MMHG

## 2022-04-28 VITALS — SYSTOLIC BLOOD PRESSURE: 178 MMHG | DIASTOLIC BLOOD PRESSURE: 84 MMHG

## 2022-04-28 VITALS — SYSTOLIC BLOOD PRESSURE: 164 MMHG | DIASTOLIC BLOOD PRESSURE: 69 MMHG

## 2022-04-28 VITALS — DIASTOLIC BLOOD PRESSURE: 66 MMHG | SYSTOLIC BLOOD PRESSURE: 124 MMHG

## 2022-04-28 LAB
ALBUMIN SERPL-MCNC: 2.1 G/DL (ref 3.4–5)
ALBUMIN/GLOB SERPL: 0.5 {RATIO} (ref 1–1.7)
ALP SERPL-CCNC: 133 U/L (ref 46–116)
ALT SERPL-CCNC: 67 U/L (ref 14–59)
ANION GAP SERPL CALC-SCNC: 11 MMOL/L (ref 6–14)
AST SERPL-CCNC: 20 U/L (ref 15–37)
BILIRUB SERPL-MCNC: 0.2 MG/DL (ref 0.2–1)
BUN SERPL-MCNC: 36 MG/DL (ref 7–20)
BUN/CREAT SERPL: 9 (ref 6–20)
CALCIUM SERPL-MCNC: 7.8 MG/DL (ref 8.5–10.1)
CHLORIDE SERPL-SCNC: 105 MMOL/L (ref 98–107)
CO2 SERPL-SCNC: 19 MMOL/L (ref 21–32)
CREAT SERPL-MCNC: 4.2 MG/DL (ref 0.6–1)
ERYTHROCYTE [DISTWIDTH] IN BLOOD BY AUTOMATED COUNT: 14 % (ref 11.5–14.5)
GFR SERPLBLD BASED ON 1.73 SQ M-ARVRAT: 11.7 ML/MIN
GLUCOSE SERPL-MCNC: 211 MG/DL (ref 70–99)
HCT VFR BLD CALC: 31.6 % (ref 36–47)
HGB BLD-MCNC: 10.2 G/DL (ref 12–15.5)
MCH RBC QN AUTO: 28 PG (ref 25–35)
MCHC RBC AUTO-ENTMCNC: 32 G/DL (ref 31–37)
MCV RBC AUTO: 86 FL (ref 79–100)
PLATELET # BLD AUTO: 333 X10^3/UL (ref 140–400)
POTASSIUM SERPL-SCNC: 4.6 MMOL/L (ref 3.5–5.1)
PROT SERPL-MCNC: 6.4 G/DL (ref 6.4–8.2)
RBC # BLD AUTO: 3.67 X10^6/UL (ref 3.5–5.4)
SODIUM SERPL-SCNC: 135 MMOL/L (ref 136–145)
WBC # BLD AUTO: 12.8 X10^3/UL (ref 4–11)

## 2022-04-28 PROCEDURE — 5A1D70Z PERFORMANCE OF URINARY FILTRATION, INTERMITTENT, LESS THAN 6 HOURS PER DAY: ICD-10-PCS

## 2022-04-28 RX ADMIN — INSULIN LISPRO SCH UNITS: 100 INJECTION, SOLUTION INTRAVENOUS; SUBCUTANEOUS at 15:55

## 2022-04-28 RX ADMIN — INSULIN LISPRO SCH UNITS: 100 INJECTION, SOLUTION INTRAVENOUS; SUBCUTANEOUS at 17:00

## 2022-04-28 RX ADMIN — ACETAMINOPHEN PRN MG: 325 TABLET, FILM COATED ORAL at 09:09

## 2022-04-28 RX ADMIN — INSULIN GLARGINE SCH UNIT: 100 INJECTION, SOLUTION SUBCUTANEOUS at 21:39

## 2022-04-28 RX ADMIN — Medication PRN APP: at 09:05

## 2022-04-28 RX ADMIN — HEPARIN SODIUM SCH UNIT: 5000 INJECTION, SOLUTION INTRAVENOUS; SUBCUTANEOUS at 21:38

## 2022-04-28 RX ADMIN — ATORVASTATIN CALCIUM SCH MG: 40 TABLET, FILM COATED ORAL at 21:37

## 2022-04-28 RX ADMIN — SPIRONOLACTONE SCH MG: 25 TABLET ORAL at 09:03

## 2022-04-28 RX ADMIN — BACITRACIN SCH MLS/HR: 5000 INJECTION, POWDER, FOR SOLUTION INTRAMUSCULAR at 09:55

## 2022-04-28 RX ADMIN — INSULIN LISPRO SCH UNITS: 100 INJECTION, SOLUTION INTRAVENOUS; SUBCUTANEOUS at 09:07

## 2022-04-28 RX ADMIN — ASPIRIN SCH MG: 325 TABLET, DELAYED RELEASE ORAL at 09:05

## 2022-04-28 RX ADMIN — ZOLPIDEM TARTRATE PRN MG: 5 TABLET ORAL at 21:37

## 2022-04-28 RX ADMIN — ONDANSETRON PRN MG: 2 INJECTION INTRAMUSCULAR; INTRAVENOUS at 23:28

## 2022-04-28 RX ADMIN — ONDANSETRON PRN MG: 4 TABLET, ORALLY DISINTEGRATING ORAL at 09:09

## 2022-04-28 RX ADMIN — MONTELUKAST SODIUM SCH MG: 10 TABLET, FILM COATED ORAL at 09:03

## 2022-04-28 RX ADMIN — MONTELUKAST SODIUM SCH MG: 10 TABLET, FILM COATED ORAL at 15:49

## 2022-04-28 RX ADMIN — HEPARIN SODIUM SCH UNIT: 5000 INJECTION, SOLUTION INTRAVENOUS; SUBCUTANEOUS at 16:09

## 2022-04-28 NOTE — PDOC
DATE OF SERVICE


DATE: 4/28/22 


TIME: 09:44





SUBJECTIVE


ROS


Denies SOB  , stable. No acute concrns voiced by nursing 





Dialysis today  - No complaints during treatment





OBJECTIVE


Vital Signs





Vital Signs








  Date Time  Temp Pulse Resp B/P (MAP) Pulse Ox O2 Delivery O2 Flow Rate FiO2


 


4/28/22 09:04  97  157/72    


 


4/28/22 06:00     95 Room Air  


 


4/28/22 04:00 98.6       





 98.6       








I & 0











Intake and Output 


 


 4/28/22





 07:00


 


Intake Total 4226 ml


 


Output Total 1565 ml


 


Balance 2661 ml


 


 


 


Intake Oral 1190 ml


 


IV Total 3036 ml


 


Output Urine Total 1565 ml











PHYSICAL EXAM


Physical Exam


GENERAL:  Morbidly Obese , Propped up in bed. NAD 


HEEN  Normocephalic, atraumatic.om mildly dry  


NECK:  Supple.


HEART:   normal first and second heart sounds.  No gallop, rub or murmur.


LUNGS :  Clear to auscultation, decreased at bases, non labored  


ABDOMEN:  Distended, soft, nontender.


NEUROLOGIC:  Grossly Normal, Moves all 4 extremities .  She did complain of 

numbness in her left side of the face without any obvious motor deficit.


PSYCH COOPERATIVE 


EXT No LE  edema  


 No Malhotra, No CVA or SP tenderness  


DERM No Rash





MICHELLE -atn   / Vomiting  /Poor po intake/ IV Contrast / Hx of NSAID use   . 

Baseline Cr normal in 2017 per [facility] records. No Interval labs available  .UA 

unremarkable, US Unremarkable,on Oliguric 


Renal function not improving, Bicarb mildly low- will dialyze today. Seen during

treatment, tolerating well. UF 1-2 as tolerated , discussed with DRN 


 Supportive care  Aldactone and   ARB held  





CKD - per Primary's note, No interval labs. Suspect CKD 2/2 DM and Uncontrolled 

HTN 





HTN Urgency   -   multiple antihypertensive medications including clonidine, 

hydralazine  metoprolol without improvement.  . Renal Duplex Normal  Tighten 

parameters back to 150/90 starting today per neurology  





Hypertensive encephalopathy, also has a small left lateral liset infarct 

extending to the left brachium pontis- NEUROLOGY FOLLOWING  . 





Type 2 Diabetes mellitus. she stopped Insulin on her own recently  





Bronchial asthma





DIAGNOSIS/ASSESSMENT


Assessment & Plan


ESRD/ARF:      Current fluid and E-lyte status does not necessitate emergent 

need for dialysis.  Will re-evaluate for dialysis in the am and continue on [] 

schedule.





ANEMIA;   [] Aranap as ordered, [] Transfuse [] with next HD as needed





HTN:   Current BP meds as reviewed.  See orders for changes.





BONE & MINERAL:   []





Discussed Plan of Care with family [] at bedside [] over the phone





COMMENT/RELEVANT DATA


Meds





Current Medications








 Medications


  (Trade)  Dose


 Ordered  Sig/Kavitha  Start Time


 Stop Time Status Last Admin


Dose Admin


 


 Acetaminophen


  (Tylenol)  650 mg  PRN Q6HRS  PRN  4/25/22 16:45


    4/28/22 09:09


650 MG


 


 Aspirin


  (Aspirin Rectal


 Supp)  300 mg  PRN DAILY  PRN  4/25/22 16:45


     





 


 Aspirin


  (Ecotrin)  325 mg  DAILYWBKFT  4/25/22 17:00


    4/28/22 09:05


325 MG


 


 Atorvastatin


 Calcium


  (Lipitor)  80 mg  QHS  4/25/22 21:00


    4/27/22 20:54


80 MG


 


 Enoxaparin Sodium


  (Lovenox 60mg


 Syringe)  60 mg  Q24H  4/26/22 17:00


    4/26/22 17:41


60 MG


 


 Enoxaparin Sodium


  (Lovenox Per


 Pharmacy


 Prophylaxis


 Dosing)  1 each  PRN DAILY  PRN  4/26/22 16:00


     





 


 Info


  (PHARMACY


 MONITORING -- do


 not chart)  1 each  PRN DAILY  PRN  4/28/22 08:30


 4/28/22 08:27 DC  





 


 Insulin Glargine


  (Lantus Syringe)  40 unit  1X  ONCE  4/24/22 23:00


 4/24/22 23:01 DC 4/24/22 22:21


40 UNIT


 


 Insulin Human


 Lispro


  (HumaLOG)  20 units  TIDWMEALS  4/26/22 08:45


    4/28/22 09:07


20 UNITS


 


 Lidocaine HCl


  (Buffered


 Lidocaine 1%)  6 ml  1X  ONCE  4/27/22 12:15


 4/27/22 12:16 DC  





 


 Losartan Potassium


  (Cozaar)  100 mg  DAILY  4/25/22 09:00


 4/26/22 10:31 DC 4/26/22 09:28


100 MG


 


 Montelukast Sodium


  (Singulair)  10 mg  DAILY  4/25/22 09:00


    4/28/22 09:03


10 MG


 


 Nicardipine HCl


 50 mg/Sodium


 Chloride  250 ml @ 


 25 mls/hr  CONT PRN  PRN  4/25/22 16:45


    4/28/22 05:23


75 MLS/HR


 


 Nifedipine


  (Procardia Xl)  30 mg  DAILY  4/25/22 09:00


    4/28/22 09:04


30 MG


 


 Ondansetron HCl


  (Zofran Odt)  4 mg  PRN Q6HRS  PRN  4/26/22 15:00


    4/28/22 09:09


4 MG


 


 Ondansetron HCl


  (Zofran)  4 mg  PRN Q4HRS  PRN  4/27/22 10:15


     





 


 Sodium Chloride  1,000 ml @ 


 400 mls/hr  Q2H30M PRN  4/28/22 08:30


 4/28/22 20:29   





 


 Sodium Chloride


  (Ayr Saline


 Nasal)  1 kelly  PRN DAILY  PRN  4/27/22 10:15


    4/28/22 09:05


1 KELLY


 


 Spironolactone


  (Aldactone)  50 mg  DAILY  4/25/22 09:00


    4/28/22 09:03


50 MG


 


 Zolpidem Tartrate


  (Ambien)  5 mg  PRN QHS  PRN  4/24/22 21:45


     











Lab





Laboratory Tests








Test


 4/27/22


12:34 4/27/22


17:49 4/27/22


21:03 4/28/22


05:25


 


Glucose (Fingerstick)


 187 mg/dL


(70-99) 201 mg/dL


(70-99) 188 mg/dL


(70-99) 





 


White Blood Count


 


 


 


 12.8 x10^3/uL


(4.0-11.0)


 


Red Blood Count


 


 


 


 3.67 x10^6/uL


(3.50-5.40)


 


Hemoglobin


 


 


 


 10.2 g/dL


(12.0-15.5)


 


Hematocrit


 


 


 


 31.6 %


(36.0-47.0)


 


Mean Corpuscular Volume    86 fL () 


 


Mean Corpuscular Hemoglobin    28 pg (25-35) 


 


Mean Corpuscular Hemoglobin


Concent 


 


 


 32 g/dL


(31-37)


 


Red Cell Distribution Width


 


 


 


 14.0 %


(11.5-14.5)


 


Platelet Count


 


 


 


 333 x10^3/uL


(140-400)


 


Sodium Level


 


 


 


 135 mmol/L


(136-145)


 


Potassium Level


 


 


 


 4.6 mmol/L


(3.5-5.1)


 


Chloride Level


 


 


 


 105 mmol/L


()


 


Carbon Dioxide Level


 


 


 


 19 mmol/L


(21-32)


 


Anion Gap    11 (6-14) 


 


Blood Urea Nitrogen


 


 


 


 36 mg/dL


(7-20)


 


Creatinine


 


 


 


 4.2 mg/dL


(0.6-1.0)


 


Estimated GFR


(Cockcroft-Gault) 


 


 


 11.7 





 


BUN/Creatinine Ratio    9 (6-20) 


 


Glucose Level


 


 


 


 211 mg/dL


(70-99)


 


Calcium Level


 


 


 


 7.8 mg/dL


(8.5-10.1)


 


Total Bilirubin


 


 


 


 0.2 mg/dL


(0.2-1.0)


 


Aspartate Amino Transf


(AST/SGOT) 


 


 


 20 U/L (15-37) 





 


Alanine Aminotransferase


(ALT/SGPT) 


 


 


 67 U/L (14-59) 





 


Alkaline Phosphatase


 


 


 


 133 U/L


()


 


Total Protein


 


 


 


 6.4 g/dL


(6.4-8.2)


 


Albumin


 


 


 


 2.1 g/dL


(3.4-5.0)


 


Albumin/Globulin Ratio    0.5 (1.0-1.7) 


 


Test


 4/28/22


08:16 


 


 





 


Glucose (Fingerstick)


 216 mg/dL


(70-99) 


 


 











Results


All relevant outside records, renal labs, imaging studies, telemetry/EKG's were 

reviewed.





Justicifation of Admission Dx:


Justifications for Admission:


Justification of Admission Dx:  Yes


Stroke - Ischemic:  Stroke-Ischemic











RAMESH LITTLEJOHN MD                Apr 28, 2022 09:44

## 2022-04-28 NOTE — PN
DATE: 04/27/2022

SUBJECTIVE:  The patient is resting, slightly propped up in bed, in no apparent 

respiratory distress.  She is awake, alert, complaining of nausea and also 

stuffy nose.  Her kidney function unfortunately is worsening.  I spoke with Dr. Garcia, the plan was to place a temporary hemodialysis catheter and a central line

as she needs to go back on Cardene drip with the aim to maintain her systolic 

pressure around 150 as per Dr. Oviedo's recommendation.



PHYSICAL EXAMINATION:

GENERAL:  When I saw her this morning, she looked well and was clearly in no 

apparent respiratory distress.  No pallor, jaundice, cyanosis or thyromegaly.  

No jugular venous distention.  No lower limb edema.

VITAL SIGNS:  Her heart rate was 96, blood pressure was 114/88, temperature was 

98.8, respiratory rate was 20 and oxygen saturation was 97% on room air.

HEAD, EYES, EARS, NOSE, AND THROAT:  Normocephalic, atraumatic.

NECK:  Supple.

HEART:  Normal first and second heart sounds.  No gallop, rub or murmur.

CHEST:  Clear to auscultation, no crepitation or rhonchi.

ABDOMEN:  Distended, soft, nontender.

NEUROLOGIC:  She has continued to have numbness in the left side of the face, 

but she moves her extremities without difficulty.



Her intake was 2620, no output was recorded.



LABORATORY DATA:  Her lab work this morning showed white cell count of 13,000, 

hemoglobin 11, hematocrit 34, MCV 87 and platelet count 362,000.  Her chemistry 

showed a serum sodium 135, potassium 4.6, chloride 103, bicarbonate 23, anion 

gap of 9, BUN 36, creatinine 4.1.  Estimated GFR was 204 mL per minute and 

calcium was 8.1.



ASSESSMENT:

1.  Hypertensive urgency for which she was started on a Cardene drip and we did 

restart her losartan as well as added Procardia; however, then Dr. Oviedo 

wanted her to maintain highest systolic pressure to maintain perfusion.  

However, he recommended a systolic pressure of around 150.

2.  Did complain of tingling and numbness in her left side of the face and MRI 

showed that she has a focal area of acute, subacute ischemia involving the left 

lateral liset extending into the left brachium pontis.  There is also an 

associated cytotoxic edema without significant mass effect or hemorrhage.

3.  Acute-on-chronic kidney injury; however, unfortunately, her creatinine is 

worsening and has risen from 1.8 to 4.1 and therefore, temporary hemodialysis 

catheter was ordered in anticipation for need of hemodialysis.

4.  The patient is known to have hypertension that is poorly controlled.

5.  Type 2 diabetes mellitus.

6.  Bronchial asthma.

7.  Morbid obesity and questionable obstructive sleep apnea.



PLAN:  To continue with the Cardene drip with the aim of maintaining systolic 

pressure around 150.  I will increase her Procardia.  Her losartan was 

discontinued given that her kidney function is worsening.  Continue to monitor 

blood sugar and adjust insulin as needed.  Continue with DVT prophylaxis.  She 

is now on Lovenox 60 mg subcutaneous once a day.  Continue with atorvastatin for

hyperlipidemia, her potassium is 4.6.  I will discontinue also spironolactone 

given that her kidney function is steadily worsening.







AMM/LAURYN/RENAE

DR: AMM/nts   DD: 04/27/2022 10:13

DT: 04/27/2022 10:38   TID: [id number]

## 2022-04-28 NOTE — NUR
1st 3H dialysis run w 2 liters removed. Tolerated well but Cardene required  thru out 
procedure to maintain goal BP. Late lunch (1530). Super delay as well as evening BG till  
wanting to eat . Back to chair late afternoon. Heavy period continues. Assist w cynthia care 
prior to activity. Co morbidities ID w encouragement to cont DM management as well as 
controlling high BP. Cont POC

## 2022-04-28 NOTE — PN
DATE: 04/28/2022

SUBJECTIVE:   The patient is resting, slightly propped up in bed, in no apparent

respiratory distress.  She had her temporary hemodialysis catheter placed and 

she is scheduled for hemodialysis today.  Her blood pressure has continued to be

extremely high and continues to be on a Cardene drip.



PHYSICAL EXAMINATION:

GENERAL:   When I examined her, she looked well and was clearly in no apparent 

respiratory distress.  No pallor, jaundice, cyanosis or thyromegaly.  No jugular

venous distention.  No limb edema.

VITAL SIGNS:  Her heart rate was 97, blood pressure was 197/72, temperature was 

98.6, respiratory rate was 18 and oxygen saturation was 95% on room air.

HEAD, EYES, EARS, NOSE, AND THROAT:  Normocephalic, atraumatic.

NECK:  Supple.

HEART:  Showed normal first and second heart sounds.  No gallop or murmur.

CHEST:  Clear to auscultation, no crepitation or rhonchi.

ABDOMEN:  Distended, soft, nontender.

NEUROLOGIC:  She has continued to have numbness in her left side of the face, 

but otherwise she is grossly intact.



Her intake was 2800, output was 1135.



LABORATORY DATA:  This morning showed a white cell count 12.8, hemoglobin 10, 

hematocrit 32, MCV 86 and platelet count 333,000.  Serum sodium was 135, 

potassium 4.6, chloride 105, bicarbonate 19, anion gap of 11, BUN 36, creatinine

4.2.  Estimated GFR was 11 mL per minute.  Her glucose was 211, calcium was 7.8.

 Total bilirubin, AST were normal, alkaline phosphatase and ALT slightly 

elevated.  Total protein 6.4, albumin was 2.1.



ASSESSMENT:

1.  Hypertensive urgency for which she was started on a Cardene drip, however, 

her losartan was discontinued.  We will continue with Procardia and adjust the 

dose as needed.

2.  The patient did complain of tingling and numbness in her left side of the 

face and MRI showed that she has a focal area of acute/subacute ischemia 

involving the left lateral liset extending into the left brachium pontis and 

there is also an associated cytotoxic edema without significant mass effect or 

hemorrhage.

3.  Acute on chronic kidney injury; however, her creatinine continued to rise 

and today it is 4.2.  She had a temporary hemodialysis catheter placed 

successfully and she is scheduled for first hemodialysis session today.

4.  The patient is known to have hypertension that was poorly controlled.

5.  Type 2 diabetes mellitus.

6.  Bronchial asthma.

7.  Morbid obesity, questionable obstructive sleep apnea.



PLAN:  To discontinue the spironolactone.  I will start her on Procardia 30 mg 

every 6 hours and we will decide on further management on a daily basis.







AMM/EDOUARD

DR: AMM/nts   DD: 04/28/2022 09:46

DT: 04/28/2022 10:52   TID: [id number]

## 2022-04-28 NOTE — PDOC
PROGRESS NOTES


Date of Service


DATE: 4/28/22 


TIME: 12:25


Assessment


Hypertensive encephalopathy, also has a small left lateral liset infarct 

extending to the left brachium pontis


Acute-on-chronic kidney injury, had CT angiogram of chest at [facility].  

Starting dialysis


History of diabetes, hypertension, hyperlipidemia, noncompliant, asthma, sleep 

apnea


Plan


Aspirin


High-dose statin


Treatment of hypertension.  Tightened parameters back to 150/90 starting 4/27


Rehabilitation modalities


Echocardiogram noted, I do not think she needs a transesophageal echocardiogram,

the location of the stroke is unlikely cardiac-source


Subjective


No new complaints


Objective





Vital Signs








  Date Time  Temp Pulse Resp B/P (MAP) Pulse Ox O2 Delivery O2 Flow Rate FiO2


 


4/28/22 09:04  97  157/72    


 


4/28/22 08:00      Room Air  


 


4/28/22 06:00     95   


 


4/28/22 04:00 98.6       





 98.6       














Intake and Output 


 


 4/28/22





 07:00


 


Intake Total 4226 ml


 


Output Total 1565 ml


 


Balance 2661 ml


 


 


 


Intake Oral 1190 ml


 


IV Total 3036 ml


 


Output Urine Total 1565 ml








PHYSICAL EXAM


Alert. Oriented to time, place and person.


PERRL.


EOMI.


CN: Slight left cheek sensory loss, otherwise no focal findings.


Muscle tone: normal.


Muscle strength: 5/


DTR: 2+


Plantar reflex: Flexor


Gait: not examined in bed.


Sensory exam: no abnormal findings.


No cerebellar signs elicited.


Review of Relevant


I have reviewed the following items lana (where applicable) has been applied.


Labs





Laboratory Tests








Test


 4/26/22


12:43 4/26/22


17:26 4/26/22


20:40 4/27/22


04:00


 


Glucose (Fingerstick)


 161 mg/dL


(70-99) 188 mg/dL


(70-99) 171 mg/dL


(70-99) 





 


Sodium Level


 


 


 


 135 mmol/L


(136-145)


 


Potassium Level


 


 


 


 4.6 mmol/L


(3.5-5.1)


 


Chloride Level


 


 


 


 103 mmol/L


()


 


Carbon Dioxide Level


 


 


 


 23 mmol/L


(21-32)


 


Anion Gap    9 (6-14) 


 


Blood Urea Nitrogen


 


 


 


 36 mg/dL


(7-20)


 


Creatinine


 


 


 


 4.1 mg/dL


(0.6-1.0)


 


Estimated GFR


(Cockcroft-Gault) 


 


 


 12.0 





 


Glucose Level


 


 


 


 204 mg/dL


(70-99)


 


Calcium Level


 


 


 


 8.1 mg/dL


(8.5-10.1)


 


Test


 4/27/22


08:31 4/27/22


12:34 4/27/22


17:49 4/27/22


21:03


 


Glucose (Fingerstick)


 189 mg/dL


(70-99) 187 mg/dL


(70-99) 201 mg/dL


(70-99) 188 mg/dL


(70-99)


 


Test


 4/28/22


05:25 4/28/22


08:16 


 





 


White Blood Count


 12.8 x10^3/uL


(4.0-11.0) 


 


 





 


Red Blood Count


 3.67 x10^6/uL


(3.50-5.40) 


 


 





 


Hemoglobin


 10.2 g/dL


(12.0-15.5) 


 


 





 


Hematocrit


 31.6 %


(36.0-47.0) 


 


 





 


Mean Corpuscular Volume 86 fL ()    


 


Mean Corpuscular Hemoglobin 28 pg (25-35)    


 


Mean Corpuscular Hemoglobin


Concent 32 g/dL


(31-37) 


 


 





 


Red Cell Distribution Width


 14.0 %


(11.5-14.5) 


 


 





 


Platelet Count


 333 x10^3/uL


(140-400) 


 


 





 


Sodium Level


 135 mmol/L


(136-145) 


 


 





 


Potassium Level


 4.6 mmol/L


(3.5-5.1) 


 


 





 


Chloride Level


 105 mmol/L


() 


 


 





 


Carbon Dioxide Level


 19 mmol/L


(21-32) 


 


 





 


Anion Gap 11 (6-14)    


 


Blood Urea Nitrogen


 36 mg/dL


(7-20) 


 


 





 


Creatinine


 4.2 mg/dL


(0.6-1.0) 


 


 





 


Estimated GFR


(Cockcroft-Gault) 11.7 


 


 


 





 


BUN/Creatinine Ratio 9 (6-20)    


 


Glucose Level


 211 mg/dL


(70-99) 


 


 





 


Calcium Level


 7.8 mg/dL


(8.5-10.1) 


 


 





 


Total Bilirubin


 0.2 mg/dL


(0.2-1.0) 


 


 





 


Aspartate Amino Transf


(AST/SGOT) 20 U/L (15-37) 


 


 


 





 


Alanine Aminotransferase


(ALT/SGPT) 67 U/L (14-59) 


 


 


 





 


Alkaline Phosphatase


 133 U/L


() 


 


 





 


Total Protein


 6.4 g/dL


(6.4-8.2) 


 


 





 


Albumin


 2.1 g/dL


(3.4-5.0) 


 


 





 


Albumin/Globulin Ratio 0.5 (1.0-1.7)    


 


Hepatitis B Surface Antigen


 Nonreactive


(Nonreactive) 


 


 





 


Hepatitis B Surface Antibody Nonreactive    


 


Glucose (Fingerstick)


 


 216 mg/dL


(70-99) 


 











Laboratory Tests








Test


 4/27/22


12:34 4/27/22


17:49 4/27/22


21:03 4/28/22


05:25


 


Glucose (Fingerstick)


 187 mg/dL


(70-99) 201 mg/dL


(70-99) 188 mg/dL


(70-99) 





 


White Blood Count


 


 


 


 12.8 x10^3/uL


(4.0-11.0)


 


Red Blood Count


 


 


 


 3.67 x10^6/uL


(3.50-5.40)


 


Hemoglobin


 


 


 


 10.2 g/dL


(12.0-15.5)


 


Hematocrit


 


 


 


 31.6 %


(36.0-47.0)


 


Mean Corpuscular Volume    86 fL () 


 


Mean Corpuscular Hemoglobin    28 pg (25-35) 


 


Mean Corpuscular Hemoglobin


Concent 


 


 


 32 g/dL


(31-37)


 


Red Cell Distribution Width


 


 


 


 14.0 %


(11.5-14.5)


 


Platelet Count


 


 


 


 333 x10^3/uL


(140-400)


 


Sodium Level


 


 


 


 135 mmol/L


(136-145)


 


Potassium Level


 


 


 


 4.6 mmol/L


(3.5-5.1)


 


Chloride Level


 


 


 


 105 mmol/L


()


 


Carbon Dioxide Level


 


 


 


 19 mmol/L


(21-32)


 


Anion Gap    11 (6-14) 


 


Blood Urea Nitrogen


 


 


 


 36 mg/dL


(7-20)


 


Creatinine


 


 


 


 4.2 mg/dL


(0.6-1.0)


 


Estimated GFR


(Cockcroft-Gault) 


 


 


 11.7 





 


BUN/Creatinine Ratio    9 (6-20) 


 


Glucose Level


 


 


 


 211 mg/dL


(70-99)


 


Calcium Level


 


 


 


 7.8 mg/dL


(8.5-10.1)


 


Total Bilirubin


 


 


 


 0.2 mg/dL


(0.2-1.0)


 


Aspartate Amino Transf


(AST/SGOT) 


 


 


 20 U/L (15-37) 





 


Alanine Aminotransferase


(ALT/SGPT) 


 


 


 67 U/L (14-59) 





 


Alkaline Phosphatase


 


 


 


 133 U/L


()


 


Total Protein


 


 


 


 6.4 g/dL


(6.4-8.2)


 


Albumin


 


 


 


 2.1 g/dL


(3.4-5.0)


 


Albumin/Globulin Ratio    0.5 (1.0-1.7) 


 


Hepatitis B Surface Antigen


 


 


 


 Nonreactive


(Nonreactive)


 


Hepatitis B Surface Antibody    Nonreactive 


 


Test


 4/28/22


08:16 


 


 





 


Glucose (Fingerstick)


 216 mg/dL


(70-99) 


 


 











Medications





Current Medications


Nicardipine HCl 50 mg/Sodium Chloride 250 ml @  25 mls/hr CONT  PRN IV PER 

PROTOCOL Last administered on 4/25/22at 15:53;  Start 4/24/22 at 21:45;  Stop 

4/25/22 at 16:43;  Status DC


Nifedipine (Procardia Xl) 30 mg DAILY PO  Last administered on 4/28/22at 09:04; 

Start 4/25/22 at 09:00;  Stop 4/28/22 at 09:48;  Status DC


Zolpidem Tartrate (Ambien) 5 mg PRN QHS  PRN PO INSOMNIA;  Start 4/24/22 at 

21:45


Montelukast Sodium (Singulair) 10 mg DAILY PO  Last administered on 4/28/22at 

09:03;  Start 4/25/22 at 09:00


Insulin Human Lispro (HumaLOG) 15 units TIDWMEALS SQ  Last administered on 

4/25/22at 18:30;  Start 4/25/22 at 08:00;  Stop 4/26/22 at 08:47;  Status DC


Insulin Glargine (Lantus Syringe) 40 unit QHS SQ  Last administered on 4/27/22at

20:59;  Start 4/25/22 at 21:00


Losartan Potassium (Cozaar) 100 mg DAILY PO  Last administered on 4/26/22at 

09:28;  Start 4/25/22 at 09:00;  Stop 4/26/22 at 10:31;  Status DC


Spironolactone (Aldactone) 50 mg DAILY PO  Last administered on 4/28/22at 09:03;

 Start 4/25/22 at 09:00;  Stop 4/28/22 at 09:48;  Status DC


Insulin Glargine (Lantus Syringe) 40 unit 1X  ONCE SQ  Last administered on 

4/24/22at 22:21;  Start 4/24/22 at 23:00;  Stop 4/24/22 at 23:01;  Status DC


Sodium Chloride 1,000 ml @  75 mls/hr [id number] IV  Last administered on 4/27/22at

22:45;  Start 4/25/22 at 15:15


Nicardipine HCl 50 mg/Sodium Chloride 250 ml @  25 mls/hr CONT PRN  PRN IV PER 

PROTOCOL Last administered on 4/28/22at 05:23;  Start 4/25/22 at 16:45


Atorvastatin Calcium (Lipitor) 80 mg QHS PO  Last administered on 4/27/22at 

20:54;  Start 4/25/22 at 21:00


Acetaminophen (Tylenol) 650 mg PRN Q6HRS  PRN PO MILD PAIN / TEMP > 100.3'F Last

administered on 4/28/22at 09:09;  Start 4/25/22 at 16:45


Aspirin (Ecotrin) 325 mg DAILYWBKFT PO  Last administered on 4/28/22at 09:05;  

Start 4/25/22 at 17:00


Aspirin (Aspirin Rectal Supp) 300 mg PRN DAILY  PRN AL IF UNABLE TO TAKE PO;  

Start 4/25/22 at 16:45


Insulin Human Lispro (HumaLOG) 20 units TIDWMEALS SQ  Last administered on 

4/28/22at 09:07;  Start 4/26/22 at 08:45


Ondansetron HCl (Zofran Odt) 4 mg PRN Q6HRS  PRN PO NAUSEA/VOMITING Last 

administered on 4/28/22at 09:09;  Start 4/26/22 at 15:00


Enoxaparin Sodium (Lovenox Per Pharmacy Prophylaxis Dosing) 1 each PRN DAILY  

PRN MC SEE COMMENTS;  Start 4/26/22 at 16:00;  Stop 4/28/22 at 11:43;  Status DC


Enoxaparin Sodium (Lovenox 60mg Syringe) 60 mg Q24H SQ  Last administered on 

4/26/22at 17:41;  Start 4/26/22 at 17:00;  Stop 4/28/22 at 11:36;  Status DC


Ondansetron HCl (Zofran) 4 mg PRN Q4HRS  PRN IVP NAUSEA/VOMITING;  Start 4/27/22

at 10:15


Sodium Chloride (Ayr Saline Nasal) 1 kelly PRN DAILY  PRN NS NASAL CONGESTION Last

administered on 4/28/22at 09:05;  Start 4/27/22 at 10:15


Lidocaine HCl (Buffered Lidocaine 1%) 3 ml STK-MED ONCE .ROUTE ;  Start 4/27/22 

at 11:30;  Stop 4/27/22 at 11:30;  Status DC


Lidocaine HCl (Buffered Lidocaine 1%) 6 ml 1X  ONCE INJ ;  Start 4/27/22 at 

12:15;  Stop 4/27/22 at 12:16;  Status DC


Sodium Chloride 1,000 ml @  1,000 mls/hr Q1H PRN IV hypotension;  Start 4/28/22 

at 08:30;  Stop 4/28/22 at 14:29


Sodium Chloride 1,000 ml @  400 mls/hr Q2H30M PRN IV PATENCY;  Start 4/28/22 at 

08:30;  Stop 4/28/22 at 20:29


Info (PHARMACY MONITORING -- do not chart) 1 each PRN DAILY  PRN MC SEE 

COMMENTS;  Start 4/28/22 at 08:30


Info (PHARMACY MONITORING -- do not chart) 1 each PRN DAILY  PRN MC SEE 

COMMENTS;  Start 4/28/22 at 08:30;  Stop 4/28/22 at 08:27;  Status DC


Nifedipine (Procardia Xl) 60 mg DAILY PO ;  Start 4/28/22 at 10:00


Heparin Sodium (Porcine) (Heparin Sodium) 5,000 unit Q8HRS SQ ;  Start 4/28/22 

at 14:00





Active Scripts


Active


Levemir Flextouch (Insulin Detemir) 100 Unit/1 Ml Insuln.pen 40 Units SQ QHS 30 

Days


Novolog Flexpen (Insulin Aspart) 100 Unit/1 Ml Insuln.pen 15 Units SQ TIDAC 30 

Days


Reported


Tri-Sprintec (Norgestimate-Ethinyl Estradiol) 1 Each Tablet 1 Tab PO DAILY


Spironolactone 50 Mg Tablet 1 Tab PO DAILY


Montelukast Sodium Tablet  ** (Montelukast Sodium) 10 Mg Tablet 10 Mg PO DAILY


Losartan Potassium 100 Mg Tablet 100 Mg PO DAILY


Vitals/I & O





Vital Sign - Last 24 Hours








 4/27/22 4/27/22 4/27/22 4/27/22





 13:00 14:00 15:00 16:00


 


Pulse 96 100 98 


 


B/P (MAP) 175/76 157/83 170/56 


 


O2 Delivery Room Air Room Air Room Air Room Air





 4/27/22 4/27/22 4/27/22 4/27/22





 16:00 17:00 18:00 19:00


 


Temp  98.0  





  98.0  


 


Pulse 100 96 98 98


 


B/P (MAP) 166/85 173/91 204/95 145/84


 


O2 Delivery Room Air Room Air Room Air Room Air


 


    





    





 4/27/22 4/27/22 4/27/22 4/27/22





 20:00 20:00 20:00 21:00


 


Temp  98.4  





  98.4  


 


Pulse   98 98


 


B/P (MAP)   170/81 162/83


 


O2 Delivery Room Air  Room Air Room Air


 


    





    





 4/27/22 4/27/22 4/27/22 4/27/22





 22:00 23:00 23:54 23:57


 


Pulse 96 100 98 


 


B/P (MAP) 175/75 166/82 178/87 


 


Pulse Ox  97 95 


 


O2 Delivery Room Air Room Air Room Air Room Air





 4/28/22 4/28/22 4/28/22 4/28/22





 00:24 01:02 01:26 02:00


 


Pulse 90 96 102 96


 


B/P (MAP) 170/80 171/81 178/84 157/74


 


Pulse Ox 93 95 97 95


 


O2 Delivery Room Air Room Air Room Air Room Air





 4/28/22 4/28/22 4/28/22 4/28/22





 03:00 04:00 04:00 05:00


 


Temp  98.6  





  98.6  


 


Pulse 92 100  95


 


B/P (MAP) 151/78 165/72  142/73


 


Pulse Ox 85 96  95


 


O2 Delivery Room Air Room Air Room Air Room Air


 


    





    





 4/28/22 4/28/22 4/28/22 





 06:00 08:00 09:04 


 


Pulse 100  97 


 


B/P (MAP) 142/73  157/72 


 


Pulse Ox 95   


 


O2 Delivery Room Air Room Air  














Intake and Output   


 


 4/27/22 4/27/22 4/28/22





 15:00 23:00 07:00


 


Intake Total 550 ml 1834 ml 1842 ml


 


Output Total 470 ml 400 ml 695 ml


 


Balance 80 ml 1434 ml 1147 ml








Images


Echocardiogram:





LEFT VENTRICLE 


The left ventricle is normal size. There is mild to moderate concentric left 

ventricular hypertrophy. The left ventricular systolic function is normal. The 

ejection fraction estimated at 55 to 60%. There is normal LV segmental wall mo

tion. Transmitral Doppler flow pattern is Grade II-pseudonormal filling 

dynamics. No left ventricle thrombus noted on this study. There is no 

ventricular septal defect visualized. There is no left ventricular aneurysm. 

There is no mass noted in the left ventricle.





 RIGHT VENTRICLE 


The right ventricle is normal size. There is normal right ventricular wall 

thickness. The right ventricular systolic function is normal.





 ATRIA 


The left atrium is mildly dilated. The right atrium size is normal. The 

interatrial septum is intact with no evidence for an atrial septal defect or 

patent foramen ovale as noted on 2-D or Doppler imaging. Technically difficult 

study but saline bubble study appreared negative for pfo or asd.





 AORTIC VALVE 


The aortic valve is not well seen. Doppler and Color Flow revealed no 

significant aortic regurgitation. There is no significant aortic valvular 

stenosis. There is no aortic valvular vegetation.





 MITRAL VALVE 


The mitral valve is normal in structure and function. There is no evidence of 

mitral valve prolapse. There is no mitral valve stenosis. Doppler and Color-flow

revealed trace to mild mitral regurgitation.





 TRICUSPID VALVE 


The tricuspid valve is normal in structure and function. Doppler and Color Flow 

revealed trace tricuspid regurgitation. There is no tricuspid valve prolapse or 

vegetation. There is no tricuspid valve stenosis.





 PULMONIC VALVE 


The pulmonary valve is normal in structure and function. Doppler and Color Flow 

revealed no pulmonic valvular regurgitation. There is no pulmonic valvular 

stenosis.





 GREAT VESSELS 


The aortic root is normal in size. The ascending aorta is normal in size. The 

pulmonary artery is normal. The IVC is normal in size and collapses >50% with 

inspiration.





 PERICARDIAL EFFUSION 


There is no pleural effusion. There is no evidence of significant pericardial 

effusion.





Critical Notification


Critical Value: No





<Conclusion>


The left ventricular systolic function is normal.


The ejection fraction estimated at 55 to 60%.


There is normal LV segmental wall motion.


Transmitral Doppler flow pattern is Grade II-pseudonormal filling dynamics.


Trace to mild mitral regurgitation.


Trace tricuspid regurgitation.


There is no evidence of significant pericardial effusion.


Bubble study technically difficult but appears to be negative for PFO/ASD.  

Recommend ROBERTO if clinical suspicion high.





Justicifation of Admission Dx:


Justifications for Admission:


Justification of Admission Dx:  Yes


Stroke - Ischemic:  Stroke-Ischemic











ANGÉLICA MOYER MD           Apr 28, 2022 12:27

## 2022-04-29 VITALS — DIASTOLIC BLOOD PRESSURE: 66 MMHG | SYSTOLIC BLOOD PRESSURE: 147 MMHG

## 2022-04-29 VITALS — SYSTOLIC BLOOD PRESSURE: 149 MMHG | DIASTOLIC BLOOD PRESSURE: 60 MMHG

## 2022-04-29 VITALS — DIASTOLIC BLOOD PRESSURE: 65 MMHG | SYSTOLIC BLOOD PRESSURE: 144 MMHG

## 2022-04-29 VITALS — DIASTOLIC BLOOD PRESSURE: 65 MMHG | SYSTOLIC BLOOD PRESSURE: 137 MMHG

## 2022-04-29 VITALS — SYSTOLIC BLOOD PRESSURE: 141 MMHG | DIASTOLIC BLOOD PRESSURE: 64 MMHG

## 2022-04-29 VITALS — SYSTOLIC BLOOD PRESSURE: 135 MMHG | DIASTOLIC BLOOD PRESSURE: 69 MMHG

## 2022-04-29 VITALS — DIASTOLIC BLOOD PRESSURE: 68 MMHG | SYSTOLIC BLOOD PRESSURE: 134 MMHG

## 2022-04-29 VITALS — SYSTOLIC BLOOD PRESSURE: 155 MMHG | DIASTOLIC BLOOD PRESSURE: 72 MMHG

## 2022-04-29 VITALS — DIASTOLIC BLOOD PRESSURE: 62 MMHG | SYSTOLIC BLOOD PRESSURE: 144 MMHG

## 2022-04-29 VITALS — DIASTOLIC BLOOD PRESSURE: 72 MMHG | SYSTOLIC BLOOD PRESSURE: 149 MMHG

## 2022-04-29 VITALS — DIASTOLIC BLOOD PRESSURE: 67 MMHG | SYSTOLIC BLOOD PRESSURE: 147 MMHG

## 2022-04-29 VITALS — SYSTOLIC BLOOD PRESSURE: 138 MMHG | DIASTOLIC BLOOD PRESSURE: 64 MMHG

## 2022-04-29 VITALS — SYSTOLIC BLOOD PRESSURE: 177 MMHG | DIASTOLIC BLOOD PRESSURE: 69 MMHG

## 2022-04-29 VITALS — DIASTOLIC BLOOD PRESSURE: 64 MMHG | SYSTOLIC BLOOD PRESSURE: 136 MMHG

## 2022-04-29 LAB
ALBUMIN SERPL-MCNC: 2.1 G/DL (ref 3.4–5)
ALBUMIN/GLOB SERPL: 0.5 {RATIO} (ref 1–1.7)
ALP SERPL-CCNC: 134 U/L (ref 46–116)
ALT SERPL-CCNC: 60 U/L (ref 14–59)
ANION GAP SERPL CALC-SCNC: 7 MMOL/L (ref 6–14)
AST SERPL-CCNC: 23 U/L (ref 15–37)
BILIRUB SERPL-MCNC: 0.2 MG/DL (ref 0.2–1)
BUN SERPL-MCNC: 27 MG/DL (ref 7–20)
BUN/CREAT SERPL: 8 (ref 6–20)
CALCIUM SERPL-MCNC: 7.8 MG/DL (ref 8.5–10.1)
CHLORIDE SERPL-SCNC: 102 MMOL/L (ref 98–107)
CO2 SERPL-SCNC: 27 MMOL/L (ref 21–32)
CREAT SERPL-MCNC: 3.6 MG/DL (ref 0.6–1)
GFR SERPLBLD BASED ON 1.73 SQ M-ARVRAT: 13.9 ML/MIN
GLUCOSE SERPL-MCNC: 191 MG/DL (ref 70–99)
POTASSIUM SERPL-SCNC: 4.5 MMOL/L (ref 3.5–5.1)
PROT SERPL-MCNC: 6.3 G/DL (ref 6.4–8.2)
SODIUM SERPL-SCNC: 136 MMOL/L (ref 136–145)

## 2022-04-29 RX ADMIN — INSULIN LISPRO SCH UNITS: 100 INJECTION, SOLUTION INTRAVENOUS; SUBCUTANEOUS at 08:26

## 2022-04-29 RX ADMIN — HEPARIN SODIUM SCH UNIT: 5000 INJECTION, SOLUTION INTRAVENOUS; SUBCUTANEOUS at 06:27

## 2022-04-29 RX ADMIN — INSULIN GLARGINE SCH UNIT: 100 INJECTION, SOLUTION SUBCUTANEOUS at 21:00

## 2022-04-29 RX ADMIN — INSULIN LISPRO SCH UNITS: 100 INJECTION, SOLUTION INTRAVENOUS; SUBCUTANEOUS at 17:16

## 2022-04-29 RX ADMIN — BACITRACIN SCH MLS/HR: 5000 INJECTION, POWDER, FOR SOLUTION INTRAMUSCULAR at 07:05

## 2022-04-29 RX ADMIN — ATORVASTATIN CALCIUM SCH MG: 40 TABLET, FILM COATED ORAL at 21:00

## 2022-04-29 RX ADMIN — INSULIN LISPRO SCH UNITS: 100 INJECTION, SOLUTION INTRAVENOUS; SUBCUTANEOUS at 11:38

## 2022-04-29 RX ADMIN — ASPIRIN SCH MG: 325 TABLET, DELAYED RELEASE ORAL at 09:33

## 2022-04-29 NOTE — PDOC
DATE OF SERVICE


DATE: 4/29/22 


TIME: 08:53





SUBJECTIVE


ROS


Denies SOB  , States Lt side of face still feels numb. Sitting up in chair,  

Eating breakfast , denies N/V  


 No acute concerns voiced by nursing





OBJECTIVE


Vital Signs





Vital Signs








  Date Time  Temp Pulse Resp B/P (MAP) Pulse Ox O2 Delivery O2 Flow Rate FiO2


 


4/29/22 08:26  99  140/66    


 


4/29/22 07:00   16   Room Air  


 


4/29/22 04:00 98.4       





 98.4       


 


4/28/22 23:45     98   








I & 0











Intake and Output 


 


 4/29/22





 06:59


 


Intake Total 1500 ml


 


Output Total 640 ml


 


Balance 860 ml


 


 


 


Intake Oral 1500 ml


 


Output Urine Total 640 ml


 


# Bowel Movements 2











PHYSICAL EXAM


Physical Exam


ENERAL:  Morbidly Obese , Propped up in bed. NAD 


HEEN  Normocephalic, atraumatic.om mildly dry  


NECK:  Supple.


HEART:   normal first and second heart sounds.  No gallop, rub or murmur.


LUNGS :  Clear to auscultation, decreased at bases, non labored  


ABDOMEN:  Distended, soft, nontender.


NEUROLOGIC:  Grossly Normal, Moves all 4 extremities .  She did complain of 

numbness in her left side of the face without any obvious motor deficit.


PSYCH COOPERATIVE 


EXT No LE  edema  


 No Malhotra, No CVA or SP tenderness  


DERM No Rash





DIAGNOSIS/ASSESSMENT


Assessment & Plan


MICHELLE -atn   / Vomiting  /Poor po intake/ IV Contrast / Hx of NSAID use   . B

aseline Cr normal in 2017 per [facility] records. No Interval labs available  .UA 

unremarkable, US Unremarkable,on Oliguric 


Started on dialysis on 4/28  , No indication today , Bicarb and K normal  ; Re-

evaluate in am . Supportive care  Aldactone and   ARB held  





CKD - per Primary's note, No interval labs. Suspect CKD 2/2 DM and Uncontrolled 

HTN 





HTN Urgency   -   multiple antihypertensive medications at home . Renal Duplex 

Normal   . BP better 





Hypertensive encephalopathy, also has a small left lateral liset infarct 

extending to the left brachium pontis- NEUROLOGY FOLLOWING  . 





Type 2 Diabetes mellitus. she stopped Insulin on her own recently  





Bronchial asthma





COMMENT/RELEVANT DATA


Meds





Current Medications








 Medications


  (Trade)  Dose


 Ordered  Sig/Kavitha  Start Time


 Stop Time Status Last Admin


Dose Admin


 


 Acetaminophen


  (Tylenol)  650 mg  PRN Q6HRS  PRN  4/25/22 16:45


    4/28/22 09:09


650 MG


 


 Aspirin


  (Aspirin Rectal


 Supp)  300 mg  PRN DAILY  PRN  4/25/22 16:45


     





 


 Aspirin


  (Ecotrin)  325 mg  DAILYWBKFT  4/25/22 17:00


    4/28/22 09:05


325 MG


 


 Atorvastatin


 Calcium


  (Lipitor)  80 mg  QHS  4/25/22 21:00


    4/28/22 21:37


80 MG


 


 Enoxaparin Sodium


  (Lovenox 60mg


 Syringe)  60 mg  Q24H  4/26/22 17:00


 4/28/22 11:36 DC 4/26/22 17:41


60 MG


 


 Enoxaparin Sodium


  (Lovenox Per


 Pharmacy


 Prophylaxis


 Dosing)  1 each  PRN DAILY  PRN  4/26/22 16:00


 4/28/22 11:43 DC  





 


 Heparin Sodium


  (Porcine)


  (Heparin Sodium)  5,000 unit  Q8HRS  4/28/22 14:00


 4/29/22 08:49 DC 4/29/22 06:27


5,000 UNIT


 


 Info


  (PHARMACY


 MONITORING -- do


 not chart)  1 each  PRN DAILY  PRN  4/28/22 08:30


 4/28/22 08:27 DC  





 


 Insulin Glargine


  (Lantus Syringe)  40 unit  1X  ONCE  4/24/22 23:00


 4/24/22 23:01 DC 4/24/22 22:21


40 UNIT


 


 Insulin Human


 Lispro


  (HumaLOG)  20 units  TIDWMEALS  4/26/22 08:45


    4/29/22 08:26


20 UNITS


 


 Lidocaine HCl


  (Buffered


 Lidocaine 1%)  6 ml  1X  ONCE  4/27/22 12:15


 4/27/22 12:16 DC  





 


 Losartan Potassium


  (Cozaar)  100 mg  DAILY  4/25/22 09:00


 4/26/22 10:31 DC 4/26/22 09:28


100 MG


 


 Montelukast Sodium


  (Singulair)  10 mg  DAILY  4/25/22 09:00


    4/28/22 15:49


10 MG


 


 Nicardipine HCl


 50 mg/Sodium


 Chloride  250 ml @ 


 25 mls/hr  CONT PRN  PRN  4/25/22 16:45


    4/29/22 06:28


37.5 MLS/HR


 


 Nifedipine


  (Procardia Xl)  90 mg  DAILY  4/29/22 09:00


    4/29/22 08:26


90 MG


 


 Ondansetron HCl


  (Zofran Odt)  4 mg  PRN Q6HRS  PRN  4/26/22 15:00


    4/28/22 09:09


4 MG


 


 Ondansetron HCl


  (Zofran)  4 mg  PRN Q4HRS  PRN  4/27/22 10:15


    4/28/22 23:28


4 MG


 


 Sodium Chloride  1,000 ml @ 


 400 mls/hr  Q2H30M PRN  4/28/22 08:30


 4/28/22 20:29 DC  





 


 Sodium Chloride


  (Ayr Saline


 Nasal)  1 kelly  PRN DAILY  PRN  4/27/22 10:15


    4/28/22 09:05


1 KELLY


 


 Spironolactone


  (Aldactone)  50 mg  DAILY  4/25/22 09:00


 4/28/22 09:48 DC 4/28/22 09:03


50 MG


 


 Zolpidem Tartrate


  (Ambien)  5 mg  PRN QHS  PRN  4/24/22 21:45


    4/28/22 21:37


5 MG








Lab





Laboratory Tests








Test


 4/28/22


12:43 4/28/22


19:33 4/29/22


05:00


 


Glucose (Fingerstick)


 133 mg/dL


(70-99) 143 mg/dL


(70-99) 





 


Sodium Level


 


 


 136 mmol/L


(136-145)


 


Potassium Level


 


 


 4.5 mmol/L


(3.5-5.1)


 


Chloride Level


 


 


 102 mmol/L


()


 


Carbon Dioxide Level


 


 


 27 mmol/L


(21-32)


 


Anion Gap   7 (6-14) 


 


Blood Urea Nitrogen


 


 


 27 mg/dL


(7-20)


 


Creatinine


 


 


 3.6 mg/dL


(0.6-1.0)


 


Estimated GFR


(Cockcroft-Gault) 


 


 13.9 





 


BUN/Creatinine Ratio   8 (6-20) 


 


Glucose Level


 


 


 191 mg/dL


(70-99)


 


Calcium Level


 


 


 7.8 mg/dL


(8.5-10.1)


 


Total Bilirubin


 


 


 0.2 mg/dL


(0.2-1.0)


 


Aspartate Amino Transf


(AST/SGOT) 


 


 23 U/L (15-37) 





 


Alanine Aminotransferase


(ALT/SGPT) 


 


 60 U/L (14-59) 





 


Alkaline Phosphatase


 


 


 134 U/L


()


 


Total Protein


 


 


 6.3 g/dL


(6.4-8.2)


 


Albumin


 


 


 2.1 g/dL


(3.4-5.0)


 


Albumin/Globulin Ratio   0.5 (1.0-1.7) 








Results


All relevant outside records, renal labs, imaging studies, telemetry/EKG's were 

reviewed.





Justicifation of Admission Dx:


Justifications for Admission:


Justification of Admission Dx:  Yes


Stroke - Ischemic:  Stroke-Ischemic











RAMESH LITTLEJOHN MD                Apr 29, 2022 08:57

## 2022-04-29 NOTE — PDOC
PROGRESS NOTES


Date of Service


DATE: 4/29/22 


TIME: 09:34


Assessment


Hypertensive encephalopathy, also has a small left lateral liset infarct 

extending to the left brachium pontis


Acute-on-chronic kidney injury, had CT angiogram of chest at [facility].  

Starting dialysis


History of diabetes, hypertension, hyperlipidemia, noncompliant, asthma, sleep 

apnea


Echocardiogram noted, I do not think she needs a transesophageal echocardiogram,

the location of the stroke is unlikely cardiac-source


Plan


Aspirin


High-dose statin


Treatment of hypertension. Tightened parameters back to 150/90 starting 4/27


Rehabilitation modalities


Subjective


Worried about permanency of left facial numbness, I reassured patient that it 

should resolve over several weeks


Objective





Vital Signs








  Date Time  Temp Pulse Resp B/P (MAP) Pulse Ox O2 Delivery O2 Flow Rate FiO2


 


4/29/22 09:32  99  155/72    


 


4/29/22 09:00 98.6  16   Room Air  





 98.6       


 


4/29/22 08:00     96   














Intake and Output 


 


 4/29/22





 07:00


 


Intake Total 1500 ml


 


Output Total 790 ml


 


Balance 710 ml


 


 


 


Intake Oral 1500 ml


 


Output Urine Total 790 ml


 


# Bowel Movements 2








PHYSICAL EXAM


Alert. Oriented to time, place and person.


PERRL.


EOMI.


CN: Slight left cheek sensory loss, slight left central facial weakness, 

otherwise no focal findings.


Muscle tone: normal.


Muscle strength: 5/5


DTR: 2+


Plantar reflex: Flexor


Gait: not examined in bed.


Sensory exam: no abnormal findings.


No cerebellar signs elicited.


Review of Relevant


I have reviewed the following items lana (where applicable) has been applied.


Labs





Laboratory Tests








Test


 4/27/22


12:34 4/27/22


17:49 4/27/22


21:03 4/28/22


05:25


 


Glucose (Fingerstick)


 187 mg/dL


(70-99) 201 mg/dL


(70-99) 188 mg/dL


(70-99) 





 


White Blood Count


 


 


 


 12.8 x10^3/uL


(4.0-11.0)


 


Red Blood Count


 


 


 


 3.67 x10^6/uL


(3.50-5.40)


 


Hemoglobin


 


 


 


 10.2 g/dL


(12.0-15.5)


 


Hematocrit


 


 


 


 31.6 %


(36.0-47.0)


 


Mean Corpuscular Volume    86 fL () 


 


Mean Corpuscular Hemoglobin    28 pg (25-35) 


 


Mean Corpuscular Hemoglobin


Concent 


 


 


 32 g/dL


(31-37)


 


Red Cell Distribution Width


 


 


 


 14.0 %


(11.5-14.5)


 


Platelet Count


 


 


 


 333 x10^3/uL


(140-400)


 


Sodium Level


 


 


 


 135 mmol/L


(136-145)


 


Potassium Level


 


 


 


 4.6 mmol/L


(3.5-5.1)


 


Chloride Level


 


 


 


 105 mmol/L


()


 


Carbon Dioxide Level


 


 


 


 19 mmol/L


(21-32)


 


Anion Gap    11 (6-14) 


 


Blood Urea Nitrogen


 


 


 


 36 mg/dL


(7-20)


 


Creatinine


 


 


 


 4.2 mg/dL


(0.6-1.0)


 


Estimated GFR


(Cockcroft-Gault) 


 


 


 11.7 





 


BUN/Creatinine Ratio    9 (6-20) 


 


Glucose Level


 


 


 


 211 mg/dL


(70-99)


 


Calcium Level


 


 


 


 7.8 mg/dL


(8.5-10.1)


 


Total Bilirubin


 


 


 


 0.2 mg/dL


(0.2-1.0)


 


Aspartate Amino Transf


(AST/SGOT) 


 


 


 20 U/L (15-37) 





 


Alanine Aminotransferase


(ALT/SGPT) 


 


 


 67 U/L (14-59) 





 


Alkaline Phosphatase


 


 


 


 133 U/L


()


 


Total Protein


 


 


 


 6.4 g/dL


(6.4-8.2)


 


Albumin


 


 


 


 2.1 g/dL


(3.4-5.0)


 


Albumin/Globulin Ratio    0.5 (1.0-1.7) 


 


Hepatitis B Surface Antigen


 


 


 


 Nonreactive


(Nonreactive)


 


Hepatitis B Surface Antibody    Nonreactive 


 


Test


 4/28/22


08:16 4/28/22


12:43 4/28/22


19:33 4/29/22


05:00


 


Glucose (Fingerstick)


 216 mg/dL


(70-99) 133 mg/dL


(70-99) 143 mg/dL


(70-99) 





 


Sodium Level


 


 


 


 136 mmol/L


(136-145)


 


Potassium Level


 


 


 


 4.5 mmol/L


(3.5-5.1)


 


Chloride Level


 


 


 


 102 mmol/L


()


 


Carbon Dioxide Level


 


 


 


 27 mmol/L


(21-32)


 


Anion Gap    7 (6-14) 


 


Blood Urea Nitrogen


 


 


 


 27 mg/dL


(7-20)


 


Creatinine


 


 


 


 3.6 mg/dL


(0.6-1.0)


 


Estimated GFR


(Cockcroft-Gault) 


 


 


 13.9 





 


BUN/Creatinine Ratio    8 (6-20) 


 


Glucose Level


 


 


 


 191 mg/dL


(70-99)


 


Calcium Level


 


 


 


 7.8 mg/dL


(8.5-10.1)


 


Total Bilirubin


 


 


 


 0.2 mg/dL


(0.2-1.0)


 


Aspartate Amino Transf


(AST/SGOT) 


 


 


 23 U/L (15-37) 





 


Alanine Aminotransferase


(ALT/SGPT) 


 


 


 60 U/L (14-59) 





 


Alkaline Phosphatase


 


 


 


 134 U/L


()


 


Total Protein


 


 


 


 6.3 g/dL


(6.4-8.2)


 


Albumin


 


 


 


 2.1 g/dL


(3.4-5.0)


 


Albumin/Globulin Ratio    0.5 (1.0-1.7) 








Laboratory Tests








Test


 4/28/22


12:43 4/28/22


19:33 4/29/22


05:00


 


Glucose (Fingerstick)


 133 mg/dL


(70-99) 143 mg/dL


(70-99) 





 


Sodium Level


 


 


 136 mmol/L


(136-145)


 


Potassium Level


 


 


 4.5 mmol/L


(3.5-5.1)


 


Chloride Level


 


 


 102 mmol/L


()


 


Carbon Dioxide Level


 


 


 27 mmol/L


(21-32)


 


Anion Gap   7 (6-14) 


 


Blood Urea Nitrogen


 


 


 27 mg/dL


(7-20)


 


Creatinine


 


 


 3.6 mg/dL


(0.6-1.0)


 


Estimated GFR


(Cockcroft-Gault) 


 


 13.9 





 


BUN/Creatinine Ratio   8 (6-20) 


 


Glucose Level


 


 


 191 mg/dL


(70-99)


 


Calcium Level


 


 


 7.8 mg/dL


(8.5-10.1)


 


Total Bilirubin


 


 


 0.2 mg/dL


(0.2-1.0)


 


Aspartate Amino Transf


(AST/SGOT) 


 


 23 U/L (15-37) 





 


Alanine Aminotransferase


(ALT/SGPT) 


 


 60 U/L (14-59) 





 


Alkaline Phosphatase


 


 


 134 U/L


()


 


Total Protein


 


 


 6.3 g/dL


(6.4-8.2)


 


Albumin


 


 


 2.1 g/dL


(3.4-5.0)


 


Albumin/Globulin Ratio   0.5 (1.0-1.7) 








Medications





Current Medications


Nicardipine HCl 50 mg/Sodium Chloride 250 ml @  25 mls/hr CONT  PRN IV PER 

PROTOCOL Last administered on 4/25/22at 15:53;  Start 4/24/22 at 21:45;  Stop 4/ 25/22 at 16:43;  Status DC


Nifedipine (Procardia Xl) 30 mg DAILY PO  Last administered on 4/28/22at 09:04; 

Start 4/25/22 at 09:00;  Stop 4/28/22 at 09:48;  Status DC


Zolpidem Tartrate (Ambien) 5 mg PRN QHS  PRN PO INSOMNIA Last administered on 

4/28/22at 21:37;  Start 4/24/22 at 21:45


Montelukast Sodium (Singulair) 10 mg DAILY PO  Last administered on 4/28/22at 

15:49;  Start 4/25/22 at 09:00


Insulin Human Lispro (HumaLOG) 15 units TIDWMEALS SQ  Last administered on 

4/25/22at 18:30;  Start 4/25/22 at 08:00;  Stop 4/26/22 at 08:47;  Status DC


Insulin Glargine (Lantus Syringe) 40 unit QHS SQ  Last administered on 4/28/22at

21:39;  Start 4/25/22 at 21:00


Losartan Potassium (Cozaar) 100 mg DAILY PO  Last administered on 4/26/22at 

09:28;  Start 4/25/22 at 09:00;  Stop 4/26/22 at 10:31;  Status DC


Spironolactone (Aldactone) 50 mg DAILY PO  Last administered on 4/28/22at 09:03;

 Start 4/25/22 at 09:00;  Stop 4/28/22 at 09:48;  Status DC


Insulin Glargine (Lantus Syringe) 40 unit 1X  ONCE SQ  Last administered on 

4/24/22at 22:21;  Start 4/24/22 at 23:00;  Stop 4/24/22 at 23:01;  Status DC


Sodium Chloride 1,000 ml @  25 mls/hr Q24H IV  Last administered on 4/28/22at 

09:55;  Start 4/25/22 at 15:15


Nicardipine HCl 50 mg/Sodium Chloride 250 ml @  25 mls/hr CONT PRN  PRN IV PER 

PROTOCOL Last administered on 4/29/22at 06:28;  Start 4/25/22 at 16:45


Atorvastatin Calcium (Lipitor) 80 mg QHS PO  Last administered on 4/28/22at 

21:37;  Start 4/25/22 at 21:00


Acetaminophen (Tylenol) 650 mg PRN Q6HRS  PRN PO MILD PAIN / TEMP > 100.3'F Last

administered on 4/28/22at 09:09;  Start 4/25/22 at 16:45


Aspirin (Ecotrin) 325 mg DAILYWBKFT PO  Last administered on 4/28/22at 09:05;  

Start 4/25/22 at 17:00


Aspirin (Aspirin Rectal Supp) 300 mg PRN DAILY  PRN OR IF UNABLE TO TAKE PO;  

Start 4/25/22 at 16:45


Insulin Human Lispro (HumaLOG) 20 units TIDWMEALS SQ  Last administered on 

4/29/22at 08:26;  Start 4/26/22 at 08:45


Ondansetron HCl (Zofran Odt) 4 mg PRN Q6HRS  PRN PO NAUSEA/VOMITING Last 

administered on 4/28/22at 09:09;  Start 4/26/22 at 15:00


Enoxaparin Sodium (Lovenox Per Pharmacy Prophylaxis Dosing) 1 each PRN DAILY  

PRN MC SEE COMMENTS;  Start 4/26/22 at 16:00;  Stop 4/28/22 at 11:43;  Status DC


Enoxaparin Sodium (Lovenox 60mg Syringe) 60 mg Q24H SQ  Last administered on 

4/26/22at 17:41;  Start 4/26/22 at 17:00;  Stop 4/28/22 at 11:36;  Status DC


Ondansetron HCl (Zofran) 4 mg PRN Q4HRS  PRN IVP NAUSEA/VOMITING Last 

administered on 4/28/22at 23:28;  Start 4/27/22 at 10:15


Sodium Chloride (Ayr Saline Nasal) 1 kelly PRN DAILY  PRN NS NASAL CONGESTION Last

administered on 4/28/22at 09:05;  Start 4/27/22 at 10:15


Lidocaine HCl (Buffered Lidocaine 1%) 3 ml STK-MED ONCE .ROUTE ;  Start 4/27/22 

at 11:30;  Stop 4/27/22 at 11:30;  Status DC


Lidocaine HCl (Buffered Lidocaine 1%) 6 ml 1X  ONCE INJ ;  Start 4/27/22 at 

12:15;  Stop 4/27/22 at 12:16;  Status DC


Sodium Chloride 1,000 ml @  1,000 mls/hr Q1H PRN IV hypotension;  Start 4/28/22 

at 08:30;  Stop 4/28/22 at 14:29;  Status DC


Sodium Chloride 1,000 ml @  400 mls/hr Q2H30M PRN IV PATENCY;  Start 4/28/22 at 

08:30;  Stop 4/28/22 at 20:29;  Status DC


Info (PHARMACY MONITORING -- do not chart) 1 each PRN DAILY  PRN MC SEE 

COMMENTS;  Start 4/28/22 at 08:30


Info (PHARMACY MONITORING -- do not chart) 1 each PRN DAILY  PRN MC SEE 

COMMENTS;  Start 4/28/22 at 08:30;  Stop 4/28/22 at 08:27;  Status DC


Nifedipine (Procardia Xl) 60 mg DAILY PO  Last administered on 4/28/22at 15:53; 

Start 4/28/22 at 10:00;  Stop 4/29/22 at 07:15;  Status DC


Heparin Sodium (Porcine) (Heparin Sodium) 5,000 unit Q8HRS SQ  Last administered

on 4/29/22at 06:27;  Start 4/28/22 at 14:00;  Stop 4/29/22 at 08:49;  Status DC


Nifedipine (Procardia Xl) 90 mg DAILY PO  Last administered on 4/29/22at 08:26; 

Start 4/29/22 at 09:00;  Stop 4/29/22 at 09:12;  Status DC


Nifedipine (Procardia Xl) 120 mg DAILY PO  Last administered on 4/29/22at 09:32;

 Start 4/29/22 at 09:15





Active Scripts


Active


Levemir Flextouch (Insulin Detemir) 100 Unit/1 Ml Insuln.pen 40 Units SQ QHS 30 

Days


Novolog Flexpen (Insulin Aspart) 100 Unit/1 Ml Insuln.pen 15 Units SQ TIDAC 30 

Days


Reported


Tri-Sprintec (Norgestimate-Ethinyl Estradiol) 1 Each Tablet 1 Tab PO DAILY


Spironolactone 50 Mg Tablet 1 Tab PO DAILY


Montelukast Sodium Tablet  ** (Montelukast Sodium) 10 Mg Tablet 10 Mg PO DAILY


Losartan Potassium 100 Mg Tablet 100 Mg PO DAILY


Vitals/I & O





Vital Sign - Last 24 Hours








 4/28/22 4/28/22 4/28/22 4/28/22





 10:00 11:00 12:00 12:00


 


B/P (MAP) 211/83 179/78  178/76


 


Pulse Ox 98 98  96


 


O2 Delivery Room Air Room Air Room Air Room Air





 4/28/22 4/28/22 4/28/22 4/28/22





 13:00 14:00 14:00 15:00


 


Pulse   96 


 


B/P (MAP) 164/69 185/70 185/70 196/76


 


Pulse Ox 96 96  


 


O2 Delivery Room Air Room Air  Room Air





 4/28/22 4/28/22 4/28/22 4/28/22





 15:53 16:00 16:00 17:00


 


Temp    98.6





    98.6


 


Pulse 97   102


 


B/P (MAP) 176/71  178/76 191/81


 


Pulse Ox   96 


 


O2 Delivery  Room Air Room Air Room Air


 


    





    





 4/28/22 4/28/22 4/28/22 4/28/22





 18:10 19:00 20:00 20:50


 


Temp  98.6 98.9 





  98.6 98.9 


 


Pulse 103 103 103 


 


Resp   18 


 


B/P (MAP) 202/92 175/66 155/70 


 


Pulse Ox  96 96 


 


O2 Delivery Room Air Room Air Room Air Room Air


 


    





    





 4/28/22 4/28/22 4/28/22 4/28/22





 21:00 22:00 23:15 23:30


 


Pulse 120 105 103 100


 


Resp 18 18 18 18


 


B/P (MAP) 154/68 147/65 119/64 141/62


 


Pulse Ox  96 96 98


 


O2 Delivery Room Air Room Air Room Air Room Air





 4/28/22 4/28/22 4/29/22 4/29/22





 23:45 23:59 01:00 02:09


 


Temp 98.7   





 98.7   


 


Pulse 100  100 101


 


Resp 19  18 18


 


B/P (MAP) 124/66  138/64 134/68


 


Pulse Ox 98   


 


O2 Delivery Room Air Room Air Room Air Room Air


 


    





    





 4/29/22 4/29/22 4/29/22 4/29/22





 02:30 02:45 04:00 04:00


 


Temp    98.4





    98.4


 


Pulse  100  97


 


Resp  18  18


 


B/P (MAP) 135/69 144/65  136/64


 


O2 Delivery  Room Air Room Air Room Air


 


    





    





 4/29/22 4/29/22 4/29/22 4/29/22





 05:00 06:00 07:00 08:00


 


Pulse 96 91 101 99


 


Resp 18 18 16 16


 


B/P (MAP) 137/65 141/64 147/66 144/62


 


Pulse Ox    96


 


O2 Delivery Room Air Room Air Room Air Room Air





 4/29/22 4/29/22 4/29/22 4/29/22





 08:00 08:26 09:00 09:32


 


Temp   98.6 





   98.6 


 


Pulse  99 101 99


 


Resp   16 


 


B/P (MAP)  140/66 155/72 155/72


 


O2 Delivery Room Air  Room Air 














Intake and Output   


 


 4/28/22 4/28/22 4/29/22





 15:00 23:00 07:00


 


Intake Total 580 ml 920 ml 


 


Output Total 125 ml 215 ml 450 ml


 


Balance 455 ml 705 ml -450 ml











Justicifation of Admission Dx:


Justifications for Admission:


Justification of Admission Dx:  Yes


Stroke - Ischemic:  Stroke-Ischemic











ANGÉLICA MOYER MD           Apr 29, 2022 09:35

## 2022-04-30 VITALS — DIASTOLIC BLOOD PRESSURE: 87 MMHG | SYSTOLIC BLOOD PRESSURE: 165 MMHG

## 2022-04-30 VITALS — SYSTOLIC BLOOD PRESSURE: 158 MMHG | DIASTOLIC BLOOD PRESSURE: 72 MMHG

## 2022-04-30 VITALS — DIASTOLIC BLOOD PRESSURE: 68 MMHG | SYSTOLIC BLOOD PRESSURE: 143 MMHG

## 2022-04-30 VITALS — DIASTOLIC BLOOD PRESSURE: 78 MMHG | SYSTOLIC BLOOD PRESSURE: 140 MMHG

## 2022-04-30 VITALS — SYSTOLIC BLOOD PRESSURE: 124 MMHG | DIASTOLIC BLOOD PRESSURE: 62 MMHG

## 2022-04-30 LAB
ALBUMIN SERPL-MCNC: 2.4 G/DL (ref 3.4–5)
ALBUMIN/GLOB SERPL: 0.5 {RATIO} (ref 1–1.7)
ALP SERPL-CCNC: 154 U/L (ref 46–116)
ALT SERPL-CCNC: 57 U/L (ref 14–59)
ANION GAP SERPL CALC-SCNC: 10 MMOL/L (ref 6–14)
AST SERPL-CCNC: 22 U/L (ref 15–37)
BILIRUB SERPL-MCNC: 0.2 MG/DL (ref 0.2–1)
BUN SERPL-MCNC: 34 MG/DL (ref 7–20)
BUN/CREAT SERPL: 9 (ref 6–20)
CALCIUM SERPL-MCNC: 8.1 MG/DL (ref 8.5–10.1)
CHLORIDE SERPL-SCNC: 100 MMOL/L (ref 98–107)
CO2 SERPL-SCNC: 25 MMOL/L (ref 21–32)
CREAT SERPL-MCNC: 4 MG/DL (ref 0.6–1)
ERYTHROCYTE [DISTWIDTH] IN BLOOD BY AUTOMATED COUNT: 13.5 % (ref 11.5–14.5)
GFR SERPLBLD BASED ON 1.73 SQ M-ARVRAT: 12.3 ML/MIN
GLUCOSE SERPL-MCNC: 207 MG/DL (ref 70–99)
HCT VFR BLD CALC: 29.6 % (ref 36–47)
HGB BLD-MCNC: 9.8 G/DL (ref 12–15.5)
MCH RBC QN AUTO: 29 PG (ref 25–35)
MCHC RBC AUTO-ENTMCNC: 33 G/DL (ref 31–37)
MCV RBC AUTO: 86 FL (ref 79–100)
PLATELET # BLD AUTO: 317 X10^3/UL (ref 140–400)
POTASSIUM SERPL-SCNC: 4.7 MMOL/L (ref 3.5–5.1)
PROT SERPL-MCNC: 6.8 G/DL (ref 6.4–8.2)
RBC # BLD AUTO: 3.43 X10^6/UL (ref 3.5–5.4)
SODIUM SERPL-SCNC: 135 MMOL/L (ref 136–145)
WBC # BLD AUTO: 11.3 X10^3/UL (ref 4–11)

## 2022-04-30 PROCEDURE — 5A1D70Z PERFORMANCE OF URINARY FILTRATION, INTERMITTENT, LESS THAN 6 HOURS PER DAY: ICD-10-PCS

## 2022-04-30 RX ADMIN — INSULIN GLARGINE SCH UNIT: 100 INJECTION, SOLUTION SUBCUTANEOUS at 21:33

## 2022-04-30 RX ADMIN — ATORVASTATIN CALCIUM SCH MG: 40 TABLET, FILM COATED ORAL at 21:30

## 2022-04-30 RX ADMIN — INSULIN LISPRO SCH UNITS: 100 INJECTION, SOLUTION INTRAVENOUS; SUBCUTANEOUS at 17:24

## 2022-04-30 RX ADMIN — MONTELUKAST SODIUM SCH MG: 10 TABLET, FILM COATED ORAL at 14:11

## 2022-04-30 RX ADMIN — INSULIN LISPRO SCH UNITS: 100 INJECTION, SOLUTION INTRAVENOUS; SUBCUTANEOUS at 14:18

## 2022-04-30 RX ADMIN — INSULIN LISPRO SCH UNITS: 100 INJECTION, SOLUTION INTRAVENOUS; SUBCUTANEOUS at 08:02

## 2022-04-30 RX ADMIN — ASPIRIN SCH MG: 325 TABLET, DELAYED RELEASE ORAL at 14:11

## 2022-04-30 NOTE — PN
DATE: 04/29/2022

SUBJECTIVE:  The patient is resting, slightly propped up in her recliner, in no 

apparent distress.  She denied any chest pain or shortness of breath.  Did 

complain of vaginal bleeding.  She was on oral contraceptives pill to regulate 

her periods. We did not continue this medication as she was in bed.  She was on 

Lovenox and now heparin and I will discontinue that and start her on SCDs.  She 

is now on 90 mg Procardia and hopefully will taper and discontinue the 

nicardipine drip and the patient can be transferred to the floor to start the 

process of rehabilitation.



PHYSICAL EXAMINATION:

GENERAL:  When I saw her this morning, she looked well and was clearly in no 

apparent respiratory distress.  She is pale, not jaundiced, cyanosed, or 

thyromegaly.  No jugular venous distention.  ______ bilateral lower limb edema.

VITAL SIGNS:  Her heart rate was 101, blood pressure was 147/66, temperature was

98.4, respiratory rate was 16 and oxygen saturation was 98%.

HEAD, EYES, EARS, NOSE, AND THROAT:  Normocephalic, atraumatic.

NECK:  Supple.

HEART:  Showed normal first and second heart sounds.  No gallop, rub or murmur.

CHEST:  Clear to auscultation, no crepitation or rhonchi.

ABDOMEN:  Distended, soft, tender.

NEUROLOGIC:  She is grossly intact.



Her intake was 4225, output was 1565.



LABORATORY DATA:  Her lab work as of yesterday showed white cell count 12.8, 

hemoglobin 10, hematocrit 32, MCV 86 and platelet count 333,000.  Serum sodium 

136, potassium 4.5, chloride 102, bicarbonate 27, anion gap of 7, BUN 27, 

creatinine 3.6.  Estimated GFR was 14 mL per minute.  Her glucose 191, calcium 

was 7.8.  Total bilirubin and AST normal, ALT and alkaline phosphatase were 

normal.  Total protein 6.3, albumin was 2.1.



ASSESSMENT:

1.  Hypertensive urgency for which she is on Cardene drip; however, she is now 

on Procardia 90 mg and her blood pressure is about 140/90.  We will taper down 

and hopefully discontinue nicardipine drip.  We can increase Procardia up to 120

mg or we can add other medication; however, she was on losartan, spironolactone,

hydrochlorothiazide, which were all contraindicated in a patient with acute 

kidney injury.

2.  The patient did complain of tingling and numbness in her left side of the 

face and an MRI showed that she has a focal area of acute to subacute ischemic 

involving the left lateral liset extending to the left brachium pontis.  There is

also an associated cytotoxic edema without significant mass effect or 

hemorrhage.

3.  Acute on chronic kidney injury.  However, her creatinine went up yesterday 

to 4.2.  She was dialyzed and today, her creatinine is down to 3.64.  The 

patient is known to have hypertension that was poorly controlled.

4.  Type 2 diabetes mellitus.

5.  Bronchial asthma.

6.  Morbid obesity and questionable obstructive sleep apnea.

7.  Irregular menstrual periods, which she is on oral contraceptive pill; 

however, that was on hold given her immobility making her high risk for deep 

venous thrombosis.  I discontinued even her heparin as she now has vaginal 

bleeding and we will continue with SCDs.  We will continue to taper and 

discontinue nicardipine drip and hopefully start the process of physical and 

occupational therapy.







AMM/PRA/GUERA

DR: AMM/nts   DD: 04/29/2022 08:56

DT: 04/29/2022 09:48   TID: [id number]

## 2022-04-30 NOTE — PDOC
DATE OF SERVICE


DATE: 4/30/22 


TIME: 12:17





SUBJECTIVE


ROS


Denies SOB  ,  No N/V, C/O swelling in her arms





OBJECTIVE


Vital Signs





Vital Signs








  Date Time  Temp Pulse Resp B/P (MAP) Pulse Ox O2 Delivery O2 Flow Rate FiO2


 


4/30/22 08:00      Room Air  


 


4/30/22 07:00 98.1 106 20 124/62 (82) 90   





 98.1       








I & 0











Intake and Output 


 


 4/30/22





 07:00


 


Intake Total 1050 ml


 


Output Total 300 ml


 


Balance 750 ml


 


 


 


Intake Oral 300 ml


 


IV Total 750 ml


 


Output Urine Total 300 ml


 


# Bowel Movements 2











PHYSICAL EXAM


Physical Exam


GENERAL:  Morbidly Obese , Propped up in bed. NAD 


HEEN  Normocephalic, atraumatic.om mildly dry  


NECK:  Supple.


HEART:   normal first and second heart sounds.  No gallop, rub or murmur.


LUNGS :  Clear to auscultation, decreased at bases, non labored  


ABDOMEN:  Distended, soft, nontender.


NEUROLOGIC:  Grossly Normal, Moves all 4 extremities .  She did complain of 

numbness in her left side of the face without any obvious motor deficit.


PSYCH COOPERATIVE 


EXT No LE  edema  


 No Malhotra, No CVA or SP tenderness  


DERM No Rash





DIAGNOSIS/ASSESSMENT


Assessment & Plan





MICHELLE -atn   / Vomiting  /Poor po intake/ IV Contrast / Hx of NSAID use   . 

Baseline Cr normal in 2017 per [facility] records. No Interval labs available  .UA 

unremarkable, US Unremarkable, 


Started on dialysis on 4/28  ,2 nd treatment  4/30   Supportive care   , Monitor

for Renal recovery  .no indication for dialysis today   Re-evaluate in am . 

Strict I/O  





CKD - per Primary's note, No interval labs. Suspect CKD 2/2 DM and Uncontrolled 

HTN 





HTN Urgency   -   multiple antihypertensive medications at home . Renal Duplex 

Normal   . BP better 





Hypertensive encephalopathy, also has a small left lateral liset infarct 

extending to the left brachium pontis- NEUROLOGY FOLLOWING  . 





Type 2 Diabetes mellitus. she stopped Insulin on her own recently  





Bronchial asthma





COMMENT/RELEVANT DATA


Meds





Current Medications








 Medications


  (Trade)  Dose


 Ordered  Sig/Kavitha  Start Time


 Stop Time Status Last Admin


Dose Admin


 


 Acetaminophen


  (Tylenol)  650 mg  PRN Q6HRS  PRN  4/25/22 16:45


    4/28/22 09:09


650 MG


 


 Aspirin


  (Aspirin Rectal


 Supp)  300 mg  PRN DAILY  PRN  4/25/22 16:45


     





 


 Aspirin


  (Ecotrin)  325 mg  DAILYWBKFT  4/25/22 17:00


    4/29/22 09:33


325 MG


 


 Atorvastatin


 Calcium


  (Lipitor)  80 mg  QHS  4/25/22 21:00


    4/29/22 21:00


80 MG


 


 Enoxaparin Sodium


  (Lovenox 60mg


 Syringe)  60 mg  Q24H  4/26/22 17:00


 4/28/22 11:36 DC 4/26/22 17:41


60 MG


 


 Enoxaparin Sodium


  (Lovenox Per


 Pharmacy


 Prophylaxis


 Dosing)  1 each  PRN DAILY  PRN  4/26/22 16:00


 4/28/22 11:43 DC  





 


 Heparin Sodium


  (Porcine)


  (Heparin Sodium)  5,000 unit  Q8HRS  4/28/22 14:00


 4/29/22 08:49 DC 4/29/22 06:27


5,000 UNIT


 


 Info


  (PHARMACY


 MONITORING -- do


 not chart)  1 each  PRN DAILY  PRN  4/30/22 10:45


   UNV  





 


 Insulin Glargine


  (Lantus Syringe)  40 unit  1X  ONCE  4/24/22 23:00


 4/24/22 23:01 DC 4/24/22 22:21


40 UNIT


 


 Insulin Human


 Lispro


  (HumaLOG)  20 units  TIDWMEALS  4/26/22 08:45


    4/30/22 08:02


20 UNITS


 


 Lidocaine HCl


  (Buffered


 Lidocaine 1%)  6 ml  1X  ONCE  4/27/22 12:15


 4/27/22 12:16 DC  





 


 Losartan Potassium


  (Cozaar)  100 mg  DAILY  4/25/22 09:00


 4/26/22 10:31 DC 4/26/22 09:28


100 MG


 


 Montelukast Sodium


  (Singulair)  10 mg  DAILY  4/25/22 09:00


    4/28/22 15:49


10 MG


 


 Nicardipine HCl


 50 mg/Sodium


 Chloride  250 ml @ 


 25 mls/hr  CONT PRN  PRN  4/25/22 16:45


    4/29/22 20:30


37.5 MLS/HR


 


 Nifedipine


  (Procardia Xl)  120 mg  DAILY  4/29/22 09:15


    4/29/22 09:32


30 MG


 


 Ondansetron HCl


  (Zofran Odt)  4 mg  PRN Q6HRS  PRN  4/26/22 15:00


    4/28/22 09:09


4 MG


 


 Ondansetron HCl


  (Zofran)  4 mg  PRN Q4HRS  PRN  4/27/22 10:15


    4/28/22 23:28


4 MG


 


 Sodium Chloride  1,000 ml @ 


 400 mls/hr  Q2H30M PRN  4/30/22 10:45


 4/30/22 22:44   





 


 Sodium Chloride


  (Ayr Saline


 Nasal)  1 kelly  PRN DAILY  PRN  4/27/22 10:15


    4/28/22 09:05


1 KELLY


 


 Spironolactone


  (Aldactone)  50 mg  DAILY  4/25/22 09:00


 4/28/22 09:48 DC 4/28/22 09:03


50 MG


 


 Zolpidem Tartrate


  (Ambien)  5 mg  PRN QHS  PRN  4/24/22 21:45


    4/28/22 21:37


5 MG








Lab





Laboratory Tests








Test


 4/29/22


17:04 4/29/22


21:04 4/30/22


07:35 4/30/22


07:40


 


Glucose (Fingerstick)


 191 mg/dL


(70-99) 170 mg/dL


(70-99) 204 mg/dL


(70-99) 





 


White Blood Count


 


 


 


 11.3 x10^3/uL


(4.0-11.0)


 


Red Blood Count


 


 


 


 3.43 x10^6/uL


(3.50-5.40)


 


Hemoglobin


 


 


 


 9.8 g/dL


(12.0-15.5)


 


Hematocrit


 


 


 


 29.6 %


(36.0-47.0)


 


Mean Corpuscular Volume    86 fL () 


 


Mean Corpuscular Hemoglobin    29 pg (25-35) 


 


Mean Corpuscular Hemoglobin


Concent 


 


 


 33 g/dL


(31-37)


 


Red Cell Distribution Width


 


 


 


 13.5 %


(11.5-14.5)


 


Platelet Count


 


 


 


 317 x10^3/uL


(140-400)


 


Sodium Level


 


 


 


 135 mmol/L


(136-145)


 


Potassium Level


 


 


 


 4.7 mmol/L


(3.5-5.1)


 


Chloride Level


 


 


 


 100 mmol/L


()


 


Carbon Dioxide Level


 


 


 


 25 mmol/L


(21-32)


 


Anion Gap    10 (6-14) 


 


Blood Urea Nitrogen


 


 


 


 34 mg/dL


(7-20)


 


Creatinine


 


 


 


 4.0 mg/dL


(0.6-1.0)


 


Estimated GFR


(Cockcroft-Gault) 


 


 


 12.3 





 


BUN/Creatinine Ratio    9 (6-20) 


 


Glucose Level


 


 


 


 207 mg/dL


(70-99)


 


Calcium Level


 


 


 


 8.1 mg/dL


(8.5-10.1)


 


Total Bilirubin


 


 


 


 0.2 mg/dL


(0.2-1.0)


 


Aspartate Amino Transf


(AST/SGOT) 


 


 


 22 U/L (15-37) 





 


Alanine Aminotransferase


(ALT/SGPT) 


 


 


 57 U/L (14-59) 





 


Alkaline Phosphatase


 


 


 


 154 U/L


()


 


Total Protein


 


 


 


 6.8 g/dL


(6.4-8.2)


 


Albumin


 


 


 


 2.4 g/dL


(3.4-5.0)


 


Albumin/Globulin Ratio    0.5 (1.0-1.7) 








Results


All relevant outside records, renal labs, imaging studies, telemetry/EKG's were 

reviewed.





Justicifation of Admission Dx:


Justifications for Admission:


Justification of Admission Dx:  Yes


Stroke - Ischemic:  Stroke-Ischemic











RAMESH LITTLEJOHN MD                Apr 30, 2022 12:19

## 2022-05-01 VITALS — DIASTOLIC BLOOD PRESSURE: 68 MMHG | SYSTOLIC BLOOD PRESSURE: 117 MMHG

## 2022-05-01 VITALS — SYSTOLIC BLOOD PRESSURE: 166 MMHG | DIASTOLIC BLOOD PRESSURE: 72 MMHG

## 2022-05-01 VITALS — DIASTOLIC BLOOD PRESSURE: 83 MMHG | SYSTOLIC BLOOD PRESSURE: 142 MMHG

## 2022-05-01 VITALS — SYSTOLIC BLOOD PRESSURE: 168 MMHG | DIASTOLIC BLOOD PRESSURE: 75 MMHG

## 2022-05-01 VITALS — SYSTOLIC BLOOD PRESSURE: 141 MMHG | DIASTOLIC BLOOD PRESSURE: 90 MMHG

## 2022-05-01 VITALS — SYSTOLIC BLOOD PRESSURE: 141 MMHG | DIASTOLIC BLOOD PRESSURE: 83 MMHG

## 2022-05-01 RX ADMIN — INSULIN GLARGINE SCH UNIT: 100 INJECTION, SOLUTION SUBCUTANEOUS at 22:23

## 2022-05-01 RX ADMIN — INSULIN LISPRO SCH UNITS: 100 INJECTION, SOLUTION INTRAVENOUS; SUBCUTANEOUS at 18:06

## 2022-05-01 RX ADMIN — INSULIN LISPRO SCH UNITS: 100 INJECTION, SOLUTION INTRAVENOUS; SUBCUTANEOUS at 11:55

## 2022-05-01 RX ADMIN — ACETAMINOPHEN PRN MG: 325 TABLET, FILM COATED ORAL at 21:01

## 2022-05-01 RX ADMIN — ZOLPIDEM TARTRATE PRN MG: 5 TABLET ORAL at 21:00

## 2022-05-01 RX ADMIN — ASPIRIN SCH MG: 325 TABLET, DELAYED RELEASE ORAL at 08:28

## 2022-05-01 RX ADMIN — INSULIN LISPRO SCH UNITS: 100 INJECTION, SOLUTION INTRAVENOUS; SUBCUTANEOUS at 08:29

## 2022-05-01 RX ADMIN — ONDANSETRON PRN MG: 2 INJECTION INTRAMUSCULAR; INTRAVENOUS at 15:23

## 2022-05-01 RX ADMIN — ATORVASTATIN CALCIUM SCH MG: 40 TABLET, FILM COATED ORAL at 20:53

## 2022-05-01 RX ADMIN — MONTELUKAST SODIUM SCH MG: 10 TABLET, FILM COATED ORAL at 08:28

## 2022-05-01 NOTE — PN
DATE: 04/30/2022

SUBJECTIVE:  The patient is resting, slightly propped up in her recliner in no 

apparent distress.  She is sleepy, but arousable.  On questioning her, she 

stated that she feels that her legs are markedly swollen, but denied any other 

complaint.  She continues to be on a Cardene drip and she is scheduled for 

hemodialysis today.



PHYSICAL EXAMINATION:

GENERAL:  When I examined her, she looked well and was clearly in no apparent 

respiratory distress.  She was pale, not jaundiced, cyanosed, no 

lymphadenopathy, no thyromegaly, no jugular venous distention, but mild 

bilateral lower limb edema.

VITAL SIGNS:  Her heart rate was 106, blood pressure is 124/62, temperature was 

98.1, respiratory rate was 20 and oxygen saturation was 92% on room air.

HEAD, EYES, EARS, NOSE, AND THROAT:  Normocephalic, atraumatic.

NECK:  Supple.

HEART:  Normal first and second heart sounds.  No gallop, rub or murmur.

CHEST:  Clear to auscultation.  No crepitation or rhonchi.

ABDOMEN:  Markedly distended, soft, nontender.

NEUROLOGIC:  She is sleepy, but arousable.  All her cranial nerves intact.  She 

moves extremities without difficulty.  She has an indwelling Malhotra catheter.



Her intake 1500, output was 640.



LABORATORY DATA:  As of this morning, her white cell count was 11.3, hemoglobin 

10, hematocrit 30, MCV 86 and platelet count of 317,000.  Her serum sodium was 

135, potassium 4.7, chloride 100, bicarbonate 25, anion gap of 10, BUN 34, 

creatinine 4.  Estimated GFR was 12 mL per minute.  Her glucose was 207, calcium

was 8.1.  Total bilirubin, AST, ALT, alkaline phosphatase were normal.  _____, 

total protein 6.8, albumin was 2.4.



ASSESSMENT:

1.  Hypertensive encephalopathy, for which she continues to be on a Cardene 

drip.  She is also on Procardia 120 mg once a day.  Her blood pressure still is 

much better controlled; however, she has not received any of her oral 

antihypertensive medication as she is scheduled for hemodialysis this morning.

2.  The patient did complain of tingling and numbness in her left side of the 

face and an MRI showed that she has a focal area of acute to subacute ischemia 

involving the left lateral liset extending into the left brachium pontis.  There 

is also an associated cytotoxic edema without significant mass effect or 

hemorrhage.

3.  Acute-on-chronic kidney injury.  Her creatinine has continued to rise.  She 

is scheduled for hemodialysis today.

4.  Type 2 diabetes mellitus.

5.  Bronchial asthma.

6.  Morbid obesity and obstructive sleep apnea.

7.  Irregular menstrual periods, which she is on oral contraceptive pill that 

was put on hold given her immobility, making her high risk for deep vein 

thrombosis.



PLAN:  To discontinue her heparin.  She should be on SCDs for DVT prophylaxis.  

Meanwhile, continue with taper down and hopefully discontinue nicardipine.  To 

start hopefully the process of physical and occupational therapy.







AMM/[facility]NAE

DR: AMM/nts   DD: 04/30/2022 10:17

DT: 04/30/2022 20:32   TID: [id number]

## 2022-05-01 NOTE — PN
DATE: 05/01/2022

SUBJECTIVE:  The patient is resting, slightly propped up in a recliner, no 

apparent respiratory distress.  She is concerned that she continued to have her 

vaginal bleeding as she was on oral contraceptive that we held.  She was 

dialyzed yesterday and 2 liters of fluid were removed and her Cardene drip was 

discontinued and she is now on nifedipine.



PHYSICAL EXAMINATION:

GENERAL:  When I saw her today, she was pale, not jaundiced, cyanosed. No 

lymphadenopathy, no thyromegaly, no jugular venous distention.  No limb edema.

VITAL SIGNS:  Her heart rate was 99, blood pressure was 117/68, temperature was 

98.2, respiratory rate was 20 and her oxygen saturation was 95% on room air.

HEAD, EYES, EARS, NOSE, AND THROAT:  Normocephalic, atraumatic.

NECK:  Supple.

HEART:  Showed normal first and second heart sounds.  No gallop or murmur.

CHEST:  Clear to auscultation. No crepitation or rhonchi.

ABDOMEN:  Distended, soft, nontender.  The patient has an indwelling Malhotra 

catheter.

NEUROLOGIC:  She was grossly intact.



Her intake was 1050, output was 450.



LABORATORY DATA:  She has no lab work done today.  Her blood sugar seems to be 

reasonably controlled.



ASSESSMENT AND PLAN:

1.  Hypertensive encephalopathy, for which she was on Cardene drip that was 

discontinued.  She is now on Procardia 120 mg once a day.  Her blood pressure is

well controlled and we might have to cut back on Procardia.

2.  The patient did complain of tingling and numbness in her left side of the 

face and an MRI showed that she has a focal area of acute to subacute ischemia 

involving the left lateral liset extending into the left brachium pontis.  There 

is also an associated cytotoxic edema without significant mass effect or 

hemorrhage.

3.  Acute on chronic kidney injury.  Her creatinine has continued to rise.  She 

was dialyzed yesterday and 2 liters of fluid were ultrafiltrated.

4.  Type 2 diabetes mellitus, seems to be reasonably controlled.

5.  Bronchial asthma.

6.  Morbid obesity and obstructive sleep apnea.

7.  Irregular menstrual period, which she was on oral contraceptive pills that 

we held given her immobility and making her high risk for deep vein thrombosis. 

The patient is concerned that she is bleeding continuously and therefore, I will

restart her contraceptives available.  Meanwhile, we will discontinue her Malhotra 

catheter and start the process of physical and occupational therapy.







[facility]M/[facility] [place]

DR: AMM/nts   DD: 05/01/2022 09:56

DT: 05/01/2022 11:12   TID: [id number]

## 2022-05-02 VITALS — DIASTOLIC BLOOD PRESSURE: 89 MMHG | SYSTOLIC BLOOD PRESSURE: 188 MMHG

## 2022-05-02 VITALS — DIASTOLIC BLOOD PRESSURE: 80 MMHG | SYSTOLIC BLOOD PRESSURE: 191 MMHG

## 2022-05-02 VITALS — SYSTOLIC BLOOD PRESSURE: 199 MMHG | DIASTOLIC BLOOD PRESSURE: 89 MMHG

## 2022-05-02 VITALS — DIASTOLIC BLOOD PRESSURE: 83 MMHG | SYSTOLIC BLOOD PRESSURE: 171 MMHG

## 2022-05-02 VITALS — DIASTOLIC BLOOD PRESSURE: 80 MMHG | SYSTOLIC BLOOD PRESSURE: 166 MMHG

## 2022-05-02 VITALS — SYSTOLIC BLOOD PRESSURE: 177 MMHG | DIASTOLIC BLOOD PRESSURE: 81 MMHG

## 2022-05-02 LAB
ANION GAP SERPL CALC-SCNC: 10 MMOL/L (ref 6–14)
BUN SERPL-MCNC: 23 MG/DL (ref 7–20)
CALCIUM SERPL-MCNC: 8.8 MG/DL (ref 8.5–10.1)
CHLORIDE SERPL-SCNC: 102 MMOL/L (ref 98–107)
CO2 SERPL-SCNC: 26 MMOL/L (ref 21–32)
CREAT SERPL-MCNC: 2.5 MG/DL (ref 0.6–1)
GFR SERPLBLD BASED ON 1.73 SQ M-ARVRAT: 21.2 ML/MIN
GLUCOSE SERPL-MCNC: 118 MG/DL (ref 70–99)
POTASSIUM SERPL-SCNC: 4.4 MMOL/L (ref 3.5–5.1)
SODIUM SERPL-SCNC: 138 MMOL/L (ref 136–145)

## 2022-05-02 RX ADMIN — ONDANSETRON PRN MG: 4 TABLET, ORALLY DISINTEGRATING ORAL at 13:37

## 2022-05-02 RX ADMIN — ATORVASTATIN CALCIUM SCH MG: 40 TABLET, FILM COATED ORAL at 20:55

## 2022-05-02 RX ADMIN — INSULIN LISPRO SCH UNITS: 100 INJECTION, SOLUTION INTRAVENOUS; SUBCUTANEOUS at 08:57

## 2022-05-02 RX ADMIN — ASPIRIN SCH MG: 325 TABLET, DELAYED RELEASE ORAL at 08:43

## 2022-05-02 RX ADMIN — ZOLPIDEM TARTRATE PRN MG: 5 TABLET ORAL at 21:01

## 2022-05-02 RX ADMIN — MONTELUKAST SODIUM SCH MG: 10 TABLET, FILM COATED ORAL at 08:43

## 2022-05-02 RX ADMIN — INSULIN GLARGINE SCH UNIT: 100 INJECTION, SOLUTION SUBCUTANEOUS at 20:56

## 2022-05-02 RX ADMIN — INSULIN LISPRO SCH UNITS: 100 INJECTION, SOLUTION INTRAVENOUS; SUBCUTANEOUS at 12:20

## 2022-05-02 RX ADMIN — INSULIN LISPRO SCH UNITS: 100 INJECTION, SOLUTION INTRAVENOUS; SUBCUTANEOUS at 17:00

## 2022-05-02 NOTE — PN
DATE: 05/02/2022

SUBJECTIVE:  The patient is resting, propped up in her recliner, in no apparent 

distress.  She continued to have vaginal bleeding.  Unfortunately, the pharmacy 

does not carry any oral contraceptives pill and the patient was informed that 

she can get her medications we can prescribe it for her.



PHYSICAL EXAMINATION:

GENERAL:  When I examined her, she looked pale, not jaundiced or cyanosed. No 

lymphadenopathy, no thyromegaly. No jugular venous distention.  No limb edema.

VITAL SIGNS:  Her heart rate was 101, blood pressure was 188/89, temperature was

98, respiratory rate was 19, and oxygen saturation was 95%.

HEAD, EYES, EARS, NOSE, AND THROAT:  Normocephalic, atraumatic.

NECK:  Supple.

HEART:  Showed normal first and second heart sounds.  No gallop, rub or murmur.

CHEST:  Clear to auscultation. No crepitation or rhonchi.

ABDOMEN:  Distended, soft, nontender.

NEUROLOGIC:  She was grossly intact.



Her intake was 760, no output was recorded.



LABORATORY DATA:  Her lab work this morning is still pending at time of this 

dictation.



ASSESSMENT:

1.  Hypertensive encephalopathy, for which she was on Cardene drip that was 

discontinued.  She is now on Procardia 150 mg once a day.  Her blood pressure 

continued to be high.  I will add hydralazine.

2.  The patient did complain of tingling and numbness in her left side of the 

face and an MRI showed that she has a focal area of acute to subacute ischemia 

involving the left lateral liset extending into the left brachium pontis.  There 

is also an associated cytotoxic edema without significant mass effect or 

hemorrhage.

3.  Acute on chronic kidney injury.  Her creatinine has continued to rise.  She 

was dialyzed on Saturday and 2 liters of fluid were ultrafiltrated.

4.  Type 2 diabetes mellitus, which seems to be reasonably controlled.

5.  Bronchial asthma, well compensated.

6.  Markedly obesity and obstructive sleep apnea.

7.  Irregular menstrual periods, which she was on oral contraceptive pill that 

we held given her immobility and making her high risk for deep vein thrombosis.

8.  The patient is concerned that she is bleeding continuously.  I did speak 

with the Pharmacy and apparently the Pharmacy does not carry any contraceptive 

pills.  The patient was informed that she can bring her own and we will be 

prescribing for her.



PLAN:  Obviously to continue with physical therapy and occupational therapy.  

Continue to monitor blood sugar and adjust insulin as needed.  Continue with 

hemodialysis as per Nephrology team.  I will add hydralazine to achieve a better

control of her blood pressure.







AMM/[facility]

DR: AMM/nts   DD: 05/02/2022 10:18

DT: 05/02/2022 10:30   TID: [id number]

## 2022-05-02 NOTE — PDOC
DATE OF SERVICE


DATE: 5/2/22 


TIME: 09:15





SUBJECTIVE


ROS


Denies SOB  ,  No N/V,sleeping in the recliner, easily arousable





OBJECTIVE


Vital Signs





Vital Signs








  Date Time  Temp Pulse Resp B/P (MAP) Pulse Ox O2 Delivery O2 Flow Rate FiO2


 


5/2/22 08:43  97  177/81    


 


5/2/22 07:00 98.3  18  96 Room Air  





 98.3       








I & 0











Intake and Output 


 


 5/2/22





 07:00


 


Intake Total 220 ml


 


Output Total 1300 ml


 


Balance -1080 ml


 


 


 


Intake Oral 220 ml


 


Output Urine Total 1300 ml


 


# Voids 3











PHYSICAL EXAM


Physical Exam


GENERAL:  Morbidly Obese ,  


HEEN  Normocephalic, atraumatic 


NECK:  Supple.


HEART:   normal first and second heart sounds.  No gallop, rub or murmur.


LUNGS :  Clear to auscultation, decreased at bases, non labored  


ABDOMEN:  Distended, soft, nontender.


NEUROLOGIC:  Grossly Normal, Moves all 4 extremities .  She did complain of 

numbness in her left side of the face without any obvious motor deficit.


PSYCH COOPERATIVE 


EXT  LE  edema  + 


  Malhotra + , No CVA or SP tenderness  


DERM No Rash





DIAGNOSIS/ASSESSMENT


Assessment & Plan


MICHELLE -atn   / Vomiting  /Poor po intake/ IV Contrast / Hx of NSAID use   . 

Baseline Cr normal in 2017 per [facility] records. No Interval labs available  .UA 

unremarkable, US Unremarkable, 


Started on dialysis on 4/28  ,2 nd treatment  4/30   . UOP improved    . 

Supportive care   , Monitor for Renal recovery  .no indication for dialysis 

today based on clinical status, labs still pending    Re-evaluate  Strict I/O  





CKD - per Primary's note, No interval labs. Suspect CKD 2/2 DM and Uncontrolled 

HTN 





HTN Urgency   -   multiple antihypertensive medications at home . Renal Duplex 

Normal   . BP better 





Hypertensive encephalopathy, also has a small left lateral lsiet infarct 

extending to the left brachium pontis- NEUROLOGY FOLLOWING  . 





Type 2 Diabetes mellitus. she stopped Insulin on her own recently  





Bronchial asthma





COMMENT/RELEVANT DATA


Meds





Current Medications








 Medications


  (Trade)  Dose


 Ordered  Sig/Kavitha  Start Time


 Stop Time Status Last Admin


Dose Admin


 


 Acetaminophen


  (Tylenol)  650 mg  PRN Q6HRS  PRN  4/25/22 16:45


    5/1/22 21:01


650 MG


 


 Aspirin


  (Aspirin Rectal


 Supp)  300 mg  PRN DAILY  PRN  4/25/22 16:45


     





 


 Aspirin


  (Ecotrin)  325 mg  DAILYWBKFT  4/25/22 17:00


    5/2/22 08:43


325 MG


 


 Atorvastatin


 Calcium


  (Lipitor)  80 mg  QHS  4/25/22 21:00


    5/1/22 20:53


80 MG


 


 Enoxaparin Sodium


  (Lovenox 60mg


 Syringe)  60 mg  Q24H  4/26/22 17:00


 4/28/22 11:36 DC 4/26/22 17:41


60 MG


 


 Enoxaparin Sodium


  (Lovenox Per


 Pharmacy


 Prophylaxis


 Dosing)  1 each  PRN DAILY  PRN  4/26/22 16:00


 4/28/22 11:43 DC  





 


 Heparin Sodium


  (Porcine)


  (Heparin Sodium)  5,000 unit  Q8HRS  4/28/22 14:00


 4/29/22 08:49 DC 4/29/22 06:27


5,000 UNIT


 


 Info


  (PHARMACY


 MONITORING -- do


 not chart)  1 each  PRN DAILY  PRN  4/30/22 10:45


   UNV  





 


 Insulin Glargine


  (Lantus Syringe)  40 unit  1X  ONCE  4/24/22 23:00


 4/24/22 23:01 DC 4/24/22 22:21


40 UNIT


 


 Insulin Human


 Lispro


  (HumaLOG)  20 units  TIDWMEALS  4/26/22 08:45


    5/2/22 08:57


20 UNITS


 


 Lidocaine HCl


  (Buffered


 Lidocaine 1%)  6 ml  1X  ONCE  4/27/22 12:15


 4/27/22 12:16 DC  





 


 Losartan Potassium


  (Cozaar)  100 mg  DAILY  4/25/22 09:00


 4/26/22 10:31 DC 4/26/22 09:28


100 MG


 


 Montelukast Sodium


  (Singulair)  10 mg  DAILY  4/25/22 09:00


    5/2/22 08:43


10 MG


 


 Nicardipine HCl


 50 mg/Sodium


 Chloride  250 ml @ 


 25 mls/hr  CONT PRN  PRN  4/25/22 16:45


 4/30/22 14:30 DC 4/29/22 20:30


37.5 MLS/HR


 


 Nifedipine


  (Procardia Xl)  120 mg  DAILY  4/29/22 09:15


    5/2/22 08:43


120 MG


 


 Ondansetron HCl


  (Zofran Odt)  4 mg  PRN Q6HRS  PRN  4/26/22 15:00


    4/28/22 09:09


4 MG


 


 Ondansetron HCl


  (Zofran)  4 mg  PRN Q4HRS  PRN  4/27/22 10:15


    5/1/22 15:23


4 MG


 


 Sodium Chloride  1,000 ml @ 


 400 mls/hr  Q2H30M PRN  4/30/22 10:45


 4/30/22 22:44 DC  





 


 Sodium Chloride


  (Ayr Saline


 Nasal)  1 kelly  PRN DAILY  PRN  4/27/22 10:15


    4/28/22 09:05


1 KELLY


 


 Spironolactone


  (Aldactone)  50 mg  DAILY  4/25/22 09:00


 4/28/22 09:48 DC 4/28/22 09:03


50 MG


 


 Zolpidem Tartrate


  (Ambien)  5 mg  PRN QHS  PRN  4/24/22 21:45


    5/1/22 21:00


5 MG








Lab





Laboratory Tests








Test


 5/1/22


10:59 5/1/22


16:58 5/1/22


20:17 5/2/22


07:28


 


Glucose (Fingerstick)


 136 mg/dL


(70-99) 121 mg/dL


(70-99) 101 mg/dL


(70-99) 142 mg/dL


(70-99)








Results


All relevant outside records, renal labs, imaging studies, telemetry/EKG's were 

reviewed.





Justicifation of Admission Dx:


Justifications for Admission:


Justification of Admission Dx:  Yes


Stroke - Ischemic:  Stroke-Ischemic











RAMESH LITTLEJOHN MD                 May 2, 2022 09:16

## 2022-05-02 NOTE — NUR
SS following for discharge planning. SS reviewed pt chart and discussed with pt RN. Pt is 
from home and is currently on room air. PT/OT recommended acute rehabilitation. Nephrology 
monitoring for renal recovery. Per RN, labs and UOP showing improvement. SS met with pt in 
room and discussed discharge planning and acute rehabilitation. Pt undecided at this time 
and requested SS follow up with her tomorrow. SS will continue to follow for discharge 
planning.

## 2022-05-02 NOTE — PDOC
PROGRESS NOTES


Date of Service


DATE: 5/2/22 


TIME: 08:52


Assessment


Hypertensive encephalopathy, also has a small left lateral liset infarct 

extending to the left brachium pontis.  Has left facial numbness still, needs 

roller walker to ambulate


Acute-on-chronic kidney injury, had CT angiogram of chest at [facility].  

Starting dialysis


History of diabetes, hypertension, hyperlipidemia, noncompliant, asthma, sleep 

apnea


Echocardiogram noted, I do not think she needs a transesophageal echocardiogram,

the location of the stroke is unlikely cardiac-source


Plan


Aspirin


High-dose statin


Treatment of hypertension. Tightened parameters back to 150/90 starting 4/27


Rehabilitation modalities


Subjective


No new complaints, still feels weak and tired


Objective





Vital Signs








  Date Time  Temp Pulse Resp B/P (MAP) Pulse Ox O2 Delivery O2 Flow Rate FiO2


 


5/2/22 07:00 98.3 101 18 177/81 (113) 96 Room Air  





 98.3       














Intake and Output 


 


 5/2/22





 07:00


 


Intake Total 220 ml


 


Output Total 1300 ml


 


Balance -1080 ml


 


 


 


Intake Oral 220 ml


 


Output Urine Total 1300 ml


 


# Voids 3








PHYSICAL EXAM


Alert. Oriented to time, place and person.


PERRL.


EOMI.


CN: Slight left cheek sensory loss, slight left central facial weakness, 

otherwise no focal findings.


Muscle tone: normal.


Muscle strength: 5/5


DTR: 2+


Plantar reflex: Flexor


Gait: not examined in bed.


Sensory exam: no abnormal findings.


No cerebellar signs elicited.


Review of Relevant


I have reviewed the following items lana (where applicable) has been applied.


Labs





Laboratory Tests








Test


 4/30/22


14:02 4/30/22


16:49 4/30/22


21:13 5/1/22


07:22


 


Glucose (Fingerstick)


 120 mg/dL


(70-99) 140 mg/dL


(70-99) 127 mg/dL


(70-99) 144 mg/dL


(70-99)


 


Test


 5/1/22


10:59 5/1/22


16:58 5/1/22


20:17 5/2/22


07:28


 


Glucose (Fingerstick)


 136 mg/dL


(70-99) 121 mg/dL


(70-99) 101 mg/dL


(70-99) 142 mg/dL


(70-99)








Laboratory Tests








Test


 5/1/22


10:59 5/1/22


16:58 5/1/22


20:17 5/2/22


07:28


 


Glucose (Fingerstick)


 136 mg/dL


(70-99) 121 mg/dL


(70-99) 101 mg/dL


(70-99) 142 mg/dL


(70-99)








Medications





Current Medications


Nicardipine HCl 50 mg/Sodium Chloride 250 ml @  25 mls/hr CONT  PRN IV PER 

PROTOCOL Last administered on 4/25/22at 15:53;  Start 4/24/22 at 21:45;  Stop 

4/25/22 at 16:43;  Status DC


Nifedipine (Procardia Xl) 30 mg DAILY PO  Last administered on 4/28/22at 09:04; 

Start 4/25/22 at 09:00;  Stop 4/28/22 at 09:48;  Status DC


Zolpidem Tartrate (Ambien) 5 mg PRN QHS  PRN PO INSOMNIA Last administered on 

5/1/22at 21:00;  Start 4/24/22 at 21:45


Montelukast Sodium (Singulair) 10 mg DAILY PO  Last administered on 5/1/22at 

08:28;  Start 4/25/22 at 09:00


Insulin Human Lispro (HumaLOG) 15 units TIDWMEALS SQ  Last administered on 

4/25/22at 18:30;  Start 4/25/22 at 08:00;  Stop 4/26/22 at 08:47;  Status DC


Insulin Glargine (Lantus Syringe) 40 unit QHS SQ  Last administered on 5/1/22at 

22:23;  Start 4/25/22 at 21:00


Losartan Potassium (Cozaar) 100 mg DAILY PO  Last administered on 4/26/22at 

09:28;  Start 4/25/22 at 09:00;  Stop 4/26/22 at 10:31;  Status DC


Spironolactone (Aldactone) 50 mg DAILY PO  Last administered on 4/28/22at 09:03;

 Start 4/25/22 at 09:00;  Stop 4/28/22 at 09:48;  Status DC


Insulin Glargine (Lantus Syringe) 40 unit 1X  ONCE SQ  Last administered on 

4/24/22at 22:21;  Start 4/24/22 at 23:00;  Stop 4/24/22 at 23:01;  Status DC


Sodium Chloride 1,000 ml @  25 mls/hr Q24H IV  Last administered on 4/28/22at 

09:55;  Start 4/25/22 at 15:15;  Stop 4/30/22 at 14:30;  Status DC


Nicardipine HCl 50 mg/Sodium Chloride 250 ml @  25 mls/hr CONT PRN  PRN IV PER 

PROTOCOL Last administered on 4/29/22at 20:30;  Start 4/25/22 at 16:45;  Stop 

4/30/22 at 14:30;  Status DC


Atorvastatin Calcium (Lipitor) 80 mg QHS PO  Last administered on 5/1/22at 

20:53;  Start 4/25/22 at 21:00


Acetaminophen (Tylenol) 650 mg PRN Q6HRS  PRN PO MILD PAIN / TEMP > 100.3'F Last

administered on 5/1/22at 21:01;  Start 4/25/22 at 16:45


Aspirin (Ecotrin) 325 mg DAILYWBKFT PO  Last administered on 5/1/22at 08:28;  

Start 4/25/22 at 17:00


Aspirin (Aspirin Rectal Supp) 300 mg PRN DAILY  PRN IA IF UNABLE TO TAKE PO;  

Start 4/25/22 at 16:45


Insulin Human Lispro (HumaLOG) 20 units TIDWMEALS SQ  Last administered on 

5/1/22at 18:06;  Start 4/26/22 at 08:45


Ondansetron HCl (Zofran Odt) 4 mg PRN Q6HRS  PRN PO NAUSEA/VOMITING Last 

administered on 4/28/22at 09:09;  Start 4/26/22 at 15:00


Enoxaparin Sodium (Lovenox Per Pharmacy Prophylaxis Dosing) 1 each PRN DAILY  

PRN MC SEE COMMENTS;  Start 4/26/22 at 16:00;  Stop 4/28/22 at 11:43;  Status DC


Enoxaparin Sodium (Lovenox 60mg Syringe) 60 mg Q24H SQ  Last administered on 

4/26/22at 17:41;  Start 4/26/22 at 17:00;  Stop 4/28/22 at 11:36;  Status DC


Ondansetron HCl (Zofran) 4 mg PRN Q4HRS  PRN IVP NAUSEA/VOMITING Last 

administered on 5/1/22at 15:23;  Start 4/27/22 at 10:15


Sodium Chloride (Ayr Saline Nasal) 1 kelly PRN DAILY  PRN NS NASAL CONGESTION Last

administered on 4/28/22at 09:05;  Start 4/27/22 at 10:15


Lidocaine HCl (Buffered Lidocaine 1%) 3 ml STK-MED ONCE .ROUTE ;  Start 4/27/22 

at 11:30;  Stop 4/27/22 at 11:30;  Status DC


Lidocaine HCl (Buffered Lidocaine 1%) 6 ml 1X  ONCE INJ ;  Start 4/27/22 at 

12:15;  Stop 4/27/22 at 12:16;  Status DC


Sodium Chloride 1,000 ml @  1,000 mls/hr Q1H PRN IV hypotension;  Start 4/28/22 

at 08:30;  Stop 4/28/22 at 14:29;  Status DC


Sodium Chloride 1,000 ml @  400 mls/hr Q2H30M PRN IV PATENCY;  Start 4/28/22 at 

08:30;  Stop 4/28/22 at 20:29;  Status DC


Info (PHARMACY MONITORING -- do not chart) 1 each PRN DAILY  PRN MC SEE KIERA

TS;  Start 4/28/22 at 08:30


Info (PHARMACY MONITORING -- do not chart) 1 each PRN DAILY  PRN MC SEE 

COMMENTS;  Start 4/28/22 at 08:30;  Stop 4/28/22 at 08:27;  Status DC


Nifedipine (Procardia Xl) 60 mg DAILY PO  Last administered on 4/28/22at 15:53; 

Start 4/28/22 at 10:00;  Stop 4/29/22 at 07:15;  Status DC


Heparin Sodium (Porcine) (Heparin Sodium) 5,000 unit Q8HRS SQ  Last administered

on 4/29/22at 06:27;  Start 4/28/22 at 14:00;  Stop 4/29/22 at 08:49;  Status DC


Nifedipine (Procardia Xl) 90 mg DAILY PO  Last administered on 4/29/22at 08:26; 

Start 4/29/22 at 09:00;  Stop 4/29/22 at 09:12;  Status DC


Nifedipine (Procardia Xl) 120 mg DAILY PO  Last administered on 5/1/22at 08:27; 

Start 4/29/22 at 09:15


Sodium Chloride 1,000 ml @  1,000 mls/hr Q1H PRN IV hypotension;  Start 4/30/22 

at 10:45;  Stop 4/30/22 at 16:44;  Status DC


Sodium Chloride 1,000 ml @  400 mls/hr Q2H30M PRN IV PATENCY;  Start 4/30/22 at 

10:45;  Stop 4/30/22 at 22:44;  Status DC


Info (PHARMACY MONITORING -- do not chart) 1 each PRN DAILY  PRN MC SEE 

COMMENTS;  Start 4/30/22 at 10:45;  Status UNV





Active Scripts


Active


Levemir Flextouch (Insulin Detemir) 100 Unit/1 Ml Insuln.pen 40 Units SQ QHS 30 

Days


Novolog Flexpen (Insulin Aspart) 100 Unit/1 Ml Insuln.pen 15 Units SQ TIDAC 30 

Days


Reported


Tri-Sprintec (Norgestimate-Ethinyl Estradiol) 1 Each Tablet 1 Tab PO DAILY


Spironolactone 50 Mg Tablet 1 Tab PO DAILY


Montelukast Sodium Tablet  ** (Montelukast Sodium) 10 Mg Tablet 10 Mg PO DAILY


Losartan Potassium 100 Mg Tablet 100 Mg PO DAILY


Vitals/I & O





Vital Sign - Last 24 Hours








 5/1/22 5/1/22 5/1/22 5/1/22





 11:00 15:00 18:52 20:15


 


Temp 98.2 98.3 98.7 





 98.2 98.3 98.7 


 


Pulse 99 97 103 


 


Resp 20 20 18 


 


B/P (MAP) 142/83 (102) 141/90 (107) 168/75 (106) 


 


Pulse Ox 100 94 93 


 


O2 Delivery Room Air Room Air Room Air Room Air


 


    





    





 5/1/22 5/2/22 5/2/22 





 22:00 02:20 07:00 


 


Temp 98.3 98.1 98.3 





 98.3 98.1 98.3 


 


Pulse 100 102 101 


 


Resp 18 18 18 


 


B/P (MAP) 166/72 (103) 166/80 (108) 177/81 (113) 


 


Pulse Ox 93 96 96 


 


O2 Delivery Room Air Room Air Room Air 














Intake and Output   


 


 5/1/22 5/1/22 5/2/22





 15:00 23:00 07:00


 


Intake Total 120 ml  100 ml


 


Output Total 650 ml 650 ml 


 


Balance -530 ml -650 ml 100 ml











Justicifation of Admission Dx:


Justifications for Admission:


Justification of Admission Dx:  Yes


Stroke - Ischemic:  Stroke-Ischemic











ANGÉLICA MOYER MD            May 2, 2022 08:53

## 2022-05-03 VITALS — SYSTOLIC BLOOD PRESSURE: 170 MMHG | DIASTOLIC BLOOD PRESSURE: 80 MMHG

## 2022-05-03 VITALS — DIASTOLIC BLOOD PRESSURE: 93 MMHG | SYSTOLIC BLOOD PRESSURE: 193 MMHG

## 2022-05-03 VITALS — SYSTOLIC BLOOD PRESSURE: 197 MMHG | DIASTOLIC BLOOD PRESSURE: 82 MMHG

## 2022-05-03 VITALS — SYSTOLIC BLOOD PRESSURE: 205 MMHG | DIASTOLIC BLOOD PRESSURE: 91 MMHG

## 2022-05-03 VITALS — DIASTOLIC BLOOD PRESSURE: 94 MMHG | SYSTOLIC BLOOD PRESSURE: 203 MMHG

## 2022-05-03 VITALS — SYSTOLIC BLOOD PRESSURE: 193 MMHG | DIASTOLIC BLOOD PRESSURE: 84 MMHG

## 2022-05-03 LAB
ANION GAP SERPL CALC-SCNC: 9 MMOL/L (ref 6–14)
BUN SERPL-MCNC: 21 MG/DL (ref 7–20)
CALCIUM SERPL-MCNC: 8.9 MG/DL (ref 8.5–10.1)
CHLORIDE SERPL-SCNC: 103 MMOL/L (ref 98–107)
CO2 SERPL-SCNC: 25 MMOL/L (ref 21–32)
CREAT SERPL-MCNC: 2.3 MG/DL (ref 0.6–1)
GFR SERPLBLD BASED ON 1.73 SQ M-ARVRAT: 23.4 ML/MIN
GLUCOSE SERPL-MCNC: 85 MG/DL (ref 70–99)
POTASSIUM SERPL-SCNC: 4.7 MMOL/L (ref 3.5–5.1)
SODIUM SERPL-SCNC: 137 MMOL/L (ref 136–145)

## 2022-05-03 RX ADMIN — INSULIN GLARGINE SCH UNIT: 100 INJECTION, SOLUTION SUBCUTANEOUS at 21:48

## 2022-05-03 RX ADMIN — MONTELUKAST SODIUM SCH MG: 10 TABLET, FILM COATED ORAL at 07:57

## 2022-05-03 RX ADMIN — INSULIN LISPRO SCH UNITS: 100 INJECTION, SOLUTION INTRAVENOUS; SUBCUTANEOUS at 12:18

## 2022-05-03 RX ADMIN — ONDANSETRON PRN MG: 4 TABLET, ORALLY DISINTEGRATING ORAL at 20:52

## 2022-05-03 RX ADMIN — ATORVASTATIN CALCIUM SCH MG: 40 TABLET, FILM COATED ORAL at 21:41

## 2022-05-03 RX ADMIN — INSULIN LISPRO SCH UNITS: 100 INJECTION, SOLUTION INTRAVENOUS; SUBCUTANEOUS at 17:00

## 2022-05-03 RX ADMIN — ASPIRIN SCH MG: 325 TABLET, DELAYED RELEASE ORAL at 07:57

## 2022-05-03 RX ADMIN — ACETAMINOPHEN PRN MG: 325 TABLET, FILM COATED ORAL at 21:42

## 2022-05-03 RX ADMIN — INSULIN LISPRO SCH UNITS: 100 INJECTION, SOLUTION INTRAVENOUS; SUBCUTANEOUS at 08:36

## 2022-05-03 RX ADMIN — INSULIN LISPRO SCH UNITS: 100 INJECTION, SOLUTION INTRAVENOUS; SUBCUTANEOUS at 17:20

## 2022-05-03 NOTE — NUR
SS following up with discharge planning. SS reviewed pt chart and discussed with pt RN. Pt 
is currently on room air. PT/OT recommended acute rehabilitation. Nephrology monitoring for 
renal recovery. Pt declining acute rehabilitation at this time. SS will continue to follow 
for discharge planning.

## 2022-05-03 NOTE — PDOC
DATE OF SERVICE


DATE: 5/3/22 


TIME: 10:33





SUBJECTIVE


ROS


Denies SOB  ,  No N/V. States feeling tired





OBJECTIVE


Vital Signs





Vital Signs








  Date Time  Temp Pulse Resp B/P (MAP) Pulse Ox O2 Delivery O2 Flow Rate FiO2


 


5/3/22 10:16  95  190/82    


 


5/3/22 08:00      Room Air  


 


5/3/22 07:00 98.0  18  96   





 98.0       








I & 0











Intake and Output 


 


 5/3/22





 06:59


 


Intake Total 910 ml


 


Output Total 900 ml


 


Balance 10 ml


 


 


 


Intake Oral 910 ml


 


Output Urine Total 900 ml


 


# Voids 2











PHYSICAL EXAM


Physical Exam


GENERAL:  Morbidly Obese ,  


HEEN  Normocephalic, atraumatic 


NECK:  Supple.


HEART:   normal first and second heart sounds.  No gallop, rub or murmur.


LUNGS :  Clear to auscultation, decreased at bases, non labored  


ABDOMEN:  Distended, soft, nontender.


NEUROLOGIC:  Grossly Normal, Moves all 4 extremities .  She did complain of 

numbness in her left side of the face without any obvious motor deficit.


PSYCH COOPERATIVE 


EXT  LE  edema  + 


  Wick + , No CVA or SP tenderness  


DERM No Rash





DIAGNOSIS/ASSESSMENT


Assessment & Plan





MICHELLE -atn   / Vomiting  /Poor po intake/ IV Contrast / Hx of NSAID use   . 

Baseline Cr normal in 2017 per [facility] records. No Interval labs available  .UA 

unremarkable, US Unremarkable, 


Started on dialysis on 4/28  ,2 nd treatment  4/30   .   . Supportive care   , 

Monitor for Renal recovery,   Strict I/O  (Not sure why wick dced while still 

monitoring for renl recovery)  Cr trending down   .no indication for dialysis 

today    





CKD - per Primary's note, No interval labs. Suspect CKD 2/2 DM and Uncontrolled 

HTN 





HTN Urgency   -   multiple antihypertensive medications at home . Renal Duplex 

Normal   . BP better 





Hypertensive encephalopathy, also has a small left lateral liset infarct 

extending to the left brachium pontis- NEUROLOGY FOLLOWING  . 





Type 2 Diabetes mellitus. she stopped Insulin on her own recently  





Bronchial asthma





COMMENT/RELEVANT DATA


Meds





Current Medications








 Medications


  (Trade)  Dose


 Ordered  Sig/Kavitha  Start Time


 Stop Time Status Last Admin


Dose Admin


 


 Acetaminophen


  (Tylenol)  650 mg  PRN Q6HRS  PRN  4/25/22 16:45


    5/1/22 21:01


650 MG


 


 Aspirin


  (Aspirin Rectal


 Supp)  300 mg  PRN DAILY  PRN  4/25/22 16:45


     





 


 Aspirin


  (Ecotrin)  325 mg  DAILYWBKFT  4/25/22 17:00


    5/3/22 07:57


325 MG


 


 Atorvastatin


 Calcium


  (Lipitor)  80 mg  QHS  4/25/22 21:00


    5/2/22 20:55


80 MG


 


 Enoxaparin Sodium


  (Lovenox 60mg


 Syringe)  60 mg  Q24H  4/26/22 17:00


 4/28/22 11:36 DC 4/26/22 17:41


60 MG


 


 Enoxaparin Sodium


  (Lovenox Per


 Pharmacy


 Prophylaxis


 Dosing)  1 each  PRN DAILY  PRN  4/26/22 16:00


 4/28/22 11:43 DC  





 


 Heparin Sodium


  (Porcine)


  (Heparin Sodium)  5,000 unit  Q8HRS  4/28/22 14:00


 4/29/22 08:49 DC 4/29/22 06:27


5,000 UNIT


 


 Hydralazine HCl


  (Apresoline)  50 mg  TID  5/3/22 09:00


    5/3/22 10:16


50 MG


 


 Info


  (PHARMACY


 MONITORING -- do


 not chart)  1 each  PRN DAILY  PRN  4/30/22 10:45


   UNV  





 


 Insulin Glargine


  (Lantus Syringe)  40 unit  1X  ONCE  4/24/22 23:00


 4/24/22 23:01 DC 4/24/22 22:21


40 UNIT


 


 Insulin Human


 Lispro


  (HumaLOG)  20 units  TIDWMEALS  4/26/22 08:45


    5/3/22 08:36


20 UNITS


 


 Lidocaine HCl


  (Buffered


 Lidocaine 1%)  6 ml  1X  ONCE  4/27/22 12:15


 4/27/22 12:16 DC  





 


 Losartan Potassium


  (Cozaar)  100 mg  DAILY  4/25/22 09:00


 4/26/22 10:31 DC 4/26/22 09:28


100 MG


 


 Montelukast Sodium


  (Singulair)  10 mg  DAILY  4/25/22 09:00


    5/3/22 07:57


10 MG


 


 Nicardipine HCl


 50 mg/Sodium


 Chloride  250 ml @ 


 25 mls/hr  CONT PRN  PRN  4/25/22 16:45


 4/30/22 14:30 DC 4/29/22 20:30


37.5 MLS/HR


 


 Nifedipine


  (Procardia Xl)  120 mg  DAILY  4/29/22 09:15


    5/3/22 07:57


120 MG


 


 Ondansetron HCl


  (Zofran Odt)  4 mg  PRN Q6HRS  PRN  4/26/22 15:00


    5/2/22 13:37


4 MG


 


 Ondansetron HCl


  (Zofran)  4 mg  PRN Q4HRS  PRN  4/27/22 10:15


    5/1/22 15:23


4 MG


 


 Sodium Chloride  1,000 ml @ 


 400 mls/hr  Q2H30M PRN  4/30/22 10:45


 4/30/22 22:44 DC  





 


 Sodium Chloride


  (Ayr Saline


 Nasal)  1 kelly  PRN DAILY  PRN  4/27/22 10:15


    4/28/22 09:05


1 KELLY


 


 Spironolactone


  (Aldactone)  50 mg  DAILY  4/25/22 09:00


 4/28/22 09:48 DC 4/28/22 09:03


50 MG


 


 Zolpidem Tartrate


  (Ambien)  5 mg  PRN QHS  PRN  4/24/22 21:45


    5/2/22 21:01


5 MG








Lab





Laboratory Tests








Test


 5/2/22


11:36 5/2/22


12:35 5/2/22


16:40 5/2/22


20:52


 


Glucose (Fingerstick)


 157 mg/dL


(70-99) 


 89 mg/dL


(70-99) 119 mg/dL


(70-99)


 


Hemoglobin


 


 10.0 g/dL


(12.0-15.5) 


 





 


Sodium Level


 


 138 mmol/L


(136-145) 


 





 


Potassium Level


 


 4.4 mmol/L


(3.5-5.1) 


 





 


Chloride Level


 


 102 mmol/L


() 


 





 


Carbon Dioxide Level


 


 26 mmol/L


(21-32) 


 





 


Anion Gap  10 (6-14)   


 


Blood Urea Nitrogen


 


 23 mg/dL


(7-20) 


 





 


Creatinine


 


 2.5 mg/dL


(0.6-1.0) 


 





 


Estimated GFR


(Cockcroft-Gault) 


 21.2 


 


 





 


Glucose Level


 


 118 mg/dL


(70-99) 


 





 


Calcium Level


 


 8.8 mg/dL


(8.5-10.1) 


 





 


Test


 5/3/22


06:30 5/3/22


07:16 


 





 


Sodium Level


 137 mmol/L


(136-145) 


 


 





 


Potassium Level


 4.7 mmol/L


(3.5-5.1) 


 


 





 


Chloride Level


 103 mmol/L


() 


 


 





 


Carbon Dioxide Level


 25 mmol/L


(21-32) 


 


 





 


Anion Gap 9 (6-14)    


 


Blood Urea Nitrogen


 21 mg/dL


(7-20) 


 


 





 


Creatinine


 2.3 mg/dL


(0.6-1.0) 


 


 





 


Estimated GFR


(Cockcroft-Gault) 23.4 


 


 


 





 


Glucose Level


 85 mg/dL


(70-99) 


 


 





 


Calcium Level


 8.9 mg/dL


(8.5-10.1) 


 


 





 


Glucose (Fingerstick)


 


 88 mg/dL


(70-99) 


 











Results


All relevant outside records, renal labs, imaging studies, telemetry/EKG's were 

reviewed.





Justicifation of Admission Dx:


Justifications for Admission:


Justification of Admission Dx:  Yes


Stroke - Ischemic:  Stroke-Ischemic











RAMESH LITTLEJOHN MD                 May 3, 2022 10:34

## 2022-05-04 VITALS — DIASTOLIC BLOOD PRESSURE: 87 MMHG | SYSTOLIC BLOOD PRESSURE: 187 MMHG

## 2022-05-04 VITALS — DIASTOLIC BLOOD PRESSURE: 85 MMHG | SYSTOLIC BLOOD PRESSURE: 170 MMHG

## 2022-05-04 VITALS — SYSTOLIC BLOOD PRESSURE: 201 MMHG | DIASTOLIC BLOOD PRESSURE: 76 MMHG

## 2022-05-04 VITALS — DIASTOLIC BLOOD PRESSURE: 78 MMHG | SYSTOLIC BLOOD PRESSURE: 184 MMHG

## 2022-05-04 VITALS — SYSTOLIC BLOOD PRESSURE: 186 MMHG | DIASTOLIC BLOOD PRESSURE: 79 MMHG

## 2022-05-04 VITALS — DIASTOLIC BLOOD PRESSURE: 75 MMHG | SYSTOLIC BLOOD PRESSURE: 146 MMHG

## 2022-05-04 LAB
ANION GAP SERPL CALC-SCNC: 9 MMOL/L (ref 6–14)
BUN SERPL-MCNC: 23 MG/DL (ref 7–20)
CALCIUM SERPL-MCNC: 8.8 MG/DL (ref 8.5–10.1)
CHLORIDE SERPL-SCNC: 101 MMOL/L (ref 98–107)
CO2 SERPL-SCNC: 26 MMOL/L (ref 21–32)
CREAT SERPL-MCNC: 2.4 MG/DL (ref 0.6–1)
GFR SERPLBLD BASED ON 1.73 SQ M-ARVRAT: 22.2 ML/MIN
GLUCOSE SERPL-MCNC: 124 MG/DL (ref 70–99)
POTASSIUM SERPL-SCNC: 4.7 MMOL/L (ref 3.5–5.1)
SODIUM SERPL-SCNC: 136 MMOL/L (ref 136–145)

## 2022-05-04 RX ADMIN — INSULIN LISPRO SCH UNITS: 100 INJECTION, SOLUTION INTRAVENOUS; SUBCUTANEOUS at 18:47

## 2022-05-04 RX ADMIN — INSULIN GLARGINE SCH UNIT: 100 INJECTION, SOLUTION SUBCUTANEOUS at 20:51

## 2022-05-04 RX ADMIN — INSULIN LISPRO SCH UNITS: 100 INJECTION, SOLUTION INTRAVENOUS; SUBCUTANEOUS at 17:00

## 2022-05-04 RX ADMIN — INSULIN LISPRO SCH UNITS: 100 INJECTION, SOLUTION INTRAVENOUS; SUBCUTANEOUS at 08:36

## 2022-05-04 RX ADMIN — CLONIDINE SCH PATCH: 0.3 PATCH TRANSDERMAL at 15:47

## 2022-05-04 RX ADMIN — ASPIRIN SCH MG: 325 TABLET, DELAYED RELEASE ORAL at 08:34

## 2022-05-04 RX ADMIN — ATORVASTATIN CALCIUM SCH MG: 40 TABLET, FILM COATED ORAL at 20:46

## 2022-05-04 RX ADMIN — INSULIN LISPRO SCH UNITS: 100 INJECTION, SOLUTION INTRAVENOUS; SUBCUTANEOUS at 18:33

## 2022-05-04 RX ADMIN — INSULIN LISPRO SCH UNITS: 100 INJECTION, SOLUTION INTRAVENOUS; SUBCUTANEOUS at 12:57

## 2022-05-04 RX ADMIN — ZOLPIDEM TARTRATE PRN MG: 5 TABLET ORAL at 20:47

## 2022-05-04 RX ADMIN — MONTELUKAST SODIUM SCH MG: 10 TABLET, FILM COATED ORAL at 08:34

## 2022-05-04 RX ADMIN — ACETAMINOPHEN PRN MG: 325 TABLET, FILM COATED ORAL at 20:46

## 2022-05-04 NOTE — PDOC
PROGRESS NOTES


Date of Service


DATE: 5/4/22 


TIME: 08:34


Assessment


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  Has left

facial numbness still, needs roller walker to ambulate


Hypertensive encephalopathy, blood pressure still running very high, could not 

participate in physical therapy yesterday


Acute-on-chronic kidney injury, had CT angiogram of chest at [facility].  

Started dialysis


History of diabetes, hypertension, hyperlipidemia, noncompliant, asthma, sleep 

apnea


Echocardiogram noted, I do not think she needs a transesophageal echocardiogram,

the location of the stroke is unlikely cardiac-source


Plan


Aspirin


High-dose statin


Treatment of hypertension. Tightened parameters back to 150/90 starting 4/27


Rehabilitation modalities


Subjective


Denies headache


Objective





Vital Signs








  Date Time  Temp Pulse Resp B/P (MAP) Pulse Ox O2 Delivery O2 Flow Rate FiO2


 


5/4/22 07:00 97.8 100 16 187/87 (120) 99 Room Air  





 97.8       














Intake and Output 


 


 5/4/22





 07:00


 


Intake Total 1470 ml


 


Output Total 1650 ml


 


Balance -180 ml


 


 


 


Intake Oral 1470 ml


 


Output Urine Total 1650 ml


 


# Voids 4








PHYSICAL EXAM


Alert. Oriented to time, place and person.


PERRL.


EOMI.


CN: Slight left cheek sensory loss, slight left central facial weakness, 

otherwise no focal findings.


Muscle tone: normal.


Muscle strength: 4/5


DTR: 2+


Plantar reflex: Flexor


Gait: not examined in bed.


Sensory exam: no abnormal findings.


No cerebellar signs elicited.


Review of Relevant


I have reviewed the following items lana (where applicable) has been applied.


Labs





Laboratory Tests








Test


 5/2/22


11:36 5/2/22


12:35 5/2/22


16:40 5/2/22


20:52


 


Glucose (Fingerstick)


 157 mg/dL


(70-99) 


 89 mg/dL


(70-99) 119 mg/dL


(70-99)


 


Hemoglobin


 


 10.0 g/dL


(12.0-15.5) 


 





 


Sodium Level


 


 138 mmol/L


(136-145) 


 





 


Potassium Level


 


 4.4 mmol/L


(3.5-5.1) 


 





 


Chloride Level


 


 102 mmol/L


() 


 





 


Carbon Dioxide Level


 


 26 mmol/L


(21-32) 


 





 


Anion Gap  10 (6-14)   


 


Blood Urea Nitrogen


 


 23 mg/dL


(7-20) 


 





 


Creatinine


 


 2.5 mg/dL


(0.6-1.0) 


 





 


Estimated GFR


(Cockcroft-Gault) 


 21.2 


 


 





 


Glucose Level


 


 118 mg/dL


(70-99) 


 





 


Calcium Level


 


 8.8 mg/dL


(8.5-10.1) 


 





 


Test


 5/3/22


06:30 5/3/22


07:16 5/3/22


11:37 5/3/22


16:43


 


Sodium Level


 137 mmol/L


(136-145) 


 


 





 


Potassium Level


 4.7 mmol/L


(3.5-5.1) 


 


 





 


Chloride Level


 103 mmol/L


() 


 


 





 


Carbon Dioxide Level


 25 mmol/L


(21-32) 


 


 





 


Anion Gap 9 (6-14)    


 


Blood Urea Nitrogen


 21 mg/dL


(7-20) 


 


 





 


Creatinine


 2.3 mg/dL


(0.6-1.0) 


 


 





 


Estimated GFR


(Cockcroft-Gault) 23.4 


 


 


 





 


Glucose Level


 85 mg/dL


(70-99) 


 


 





 


Calcium Level


 8.9 mg/dL


(8.5-10.1) 


 


 





 


Glucose (Fingerstick)


 


 88 mg/dL


(70-99) 79 mg/dL


(70-99) 81 mg/dL


(70-99)


 


Test


 5/3/22


20:47 5/4/22


06:00 5/4/22


07:15 





 


Glucose (Fingerstick)


 118 mg/dL


(70-99) 


 121 mg/dL


(70-99) 





 


Sodium Level


 


 136 mmol/L


(136-145) 


 





 


Potassium Level


 


 4.7 mmol/L


(3.5-5.1) 


 





 


Chloride Level


 


 101 mmol/L


() 


 





 


Carbon Dioxide Level


 


 26 mmol/L


(21-32) 


 





 


Anion Gap  9 (6-14)   


 


Blood Urea Nitrogen


 


 23 mg/dL


(7-20) 


 





 


Creatinine


 


 2.4 mg/dL


(0.6-1.0) 


 





 


Estimated GFR


(Cockcroft-Gault) 


 22.2 


 


 





 


Glucose Level


 


 124 mg/dL


(70-99) 


 





 


Calcium Level


 


 8.8 mg/dL


(8.5-10.1) 


 











Laboratory Tests








Test


 5/3/22


11:37 5/3/22


16:43 5/3/22


20:47 5/4/22


06:00


 


Glucose (Fingerstick)


 79 mg/dL


(70-99) 81 mg/dL


(70-99) 118 mg/dL


(70-99) 





 


Sodium Level


 


 


 


 136 mmol/L


(136-145)


 


Potassium Level


 


 


 


 4.7 mmol/L


(3.5-5.1)


 


Chloride Level


 


 


 


 101 mmol/L


()


 


Carbon Dioxide Level


 


 


 


 26 mmol/L


(21-32)


 


Anion Gap    9 (6-14) 


 


Blood Urea Nitrogen


 


 


 


 23 mg/dL


(7-20)


 


Creatinine


 


 


 


 2.4 mg/dL


(0.6-1.0)


 


Estimated GFR


(Cockcroft-Gault) 


 


 


 22.2 





 


Glucose Level


 


 


 


 124 mg/dL


(70-99)


 


Calcium Level


 


 


 


 8.8 mg/dL


(8.5-10.1)


 


Test


 5/4/22


07:15 


 


 





 


Glucose (Fingerstick)


 121 mg/dL


(70-99) 


 


 











Medications





Current Medications


Nicardipine HCl 50 mg/Sodium Chloride 250 ml @  25 mls/hr CONT  PRN IV PER 

PROTOCOL Last administered on 4/25/22at 15:53;  Start 4/24/22 at 21:45;  Stop 

4/25/22 at 16:43;  Status DC


Nifedipine (Procardia Xl) 30 mg DAILY PO  Last administered on 4/28/22at 09:04; 

Start 4/25/22 at 09:00;  Stop 4/28/22 at 09:48;  Status DC


Zolpidem Tartrate (Ambien) 5 mg PRN QHS  PRN PO INSOMNIA Last administered on 

5/2/22at 21:01;  Start 4/24/22 at 21:45


Montelukast Sodium (Singulair) 10 mg DAILY PO  Last administered on 5/3/22at 

07:57;  Start 4/25/22 at 09:00


Insulin Human Lispro (HumaLOG) 15 units TIDWMEALS SQ  Last administered on 

4/25/22at 18:30;  Start 4/25/22 at 08:00;  Stop 4/26/22 at 08:47;  Status DC


Insulin Glargine (Lantus Syringe) 40 unit QHS SQ  Last administered on 5/3/22at 

21:48;  Start 4/25/22 at 21:00


Losartan Potassium (Cozaar) 100 mg DAILY PO  Last administered on 4/26/22at 

09:28;  Start 4/25/22 at 09:00;  Stop 4/26/22 at 10:31;  Status DC


Spironolactone (Aldactone) 50 mg DAILY PO  Last administered on 4/28/22at 09:03;

 Start 4/25/22 at 09:00;  Stop 4/28/22 at 09:48;  Status DC


Insulin Glargine (Lantus Syringe) 40 unit 1X  ONCE SQ  Last administered on 

4/24/22at 22:21;  Start 4/24/22 at 23:00;  Stop 4/24/22 at 23:01;  Status DC


Sodium Chloride 1,000 ml @  25 mls/hr Q24H IV  Last administered on 4/28/22at 

09:55;  Start 4/25/22 at 15:15;  Stop 4/30/22 at 14:30;  Status DC


Nicardipine HCl 50 mg/Sodium Chloride 250 ml @  25 mls/hr CONT PRN  PRN IV PER 

PROTOCOL Last administered on 4/29/22at 20:30;  Start 4/25/22 at 16:45;  Stop 

4/30/22 at 14:30;  Status DC


Atorvastatin Calcium (Lipitor) 80 mg QHS PO  Last administered on 5/3/22at 

21:41;  Start 4/25/22 at 21:00


Acetaminophen (Tylenol) 650 mg PRN Q6HRS  PRN PO MILD PAIN / TEMP > 100.3'F Last

administered on 5/3/22at 21:42;  Start 4/25/22 at 16:45


Aspirin (Ecotrin) 325 mg DAILYWBKFT PO  Last administered on 5/3/22at 07:57;  

Start 4/25/22 at 17:00


Aspirin (Aspirin Rectal Supp) 300 mg PRN DAILY  PRN [place] IF UNABLE TO TAKE PO;  

Start 4/25/22 at 16:45


Insulin Human Lispro (HumaLOG) 20 units TIDWMEALS SQ  Last administered on 

5/3/22at 12:18;  Start 4/26/22 at 08:45


Ondansetron HCl (Zofran Odt) 4 mg PRN Q6HRS  PRN PO NAUSEA/VOMITING Last 

administered on 5/3/22at 20:52;  Start 4/26/22 at 15:00


Enoxaparin Sodium (Lovenox Per Pharmacy Prophylaxis Dosing) 1 each PRN DAILY  

PRN MC SEE COMMENTS;  Start 4/26/22 at 16:00;  Stop 4/28/22 at 11:43;  Status DC


Enoxaparin Sodium (Lovenox 60mg Syringe) 60 mg Q24H SQ  Last administered on 

4/26/22at 17:41;  Start 4/26/22 at 17:00;  Stop 4/28/22 at 11:36;  Status DC


Ondansetron HCl (Zofran) 4 mg PRN Q4HRS  PRN IVP NAUSEA/VOMITING Last 

administered on 5/1/22at 15:23;  Start 4/27/22 at 10:15


Sodium Chloride (Ayr Saline Nasal) 1 kelly PRN DAILY  PRN NS NASAL CONGESTION Last

administered on 4/28/22at 09:05;  Start 4/27/22 at 10:15


Lidocaine HCl (Buffered Lidocaine 1%) 3 ml STK-MED ONCE .ROUTE ;  Start 4/27/22 

at 11:30;  Stop 4/27/22 at 11:30;  Status DC


Lidocaine HCl (Buffered Lidocaine 1%) 6 ml 1X  ONCE INJ ;  Start 4/27/22 at 

12:15;  Stop 4/27/22 at 12:16;  Status DC


Sodium Chloride 1,000 ml @  1,000 mls/hr Q1H PRN IV hypotension;  Start 4/28/22 

at 08:30;  Stop 4/28/22 at 14:29;  Status DC


Sodium Chloride 1,000 ml @  400 mls/hr Q2H30M PRN IV PATENCY;  Start 4/28/22 at 

08:30;  Stop 4/28/22 at 20:29;  Status DC


Info (PHARMACY MONITORING -- do not chart) 1 each PRN DAILY  PRN MC SEE 

COMMENTS;  Start 4/28/22 at 08:30


Info (PHARMACY MONITORING -- do not chart) 1 each PRN DAILY  PRN MC SEE 

COMMENTS;  Start 4/28/22 at 08:30;  Stop 4/28/22 at 08:27;  Status DC


Nifedipine (Procardia Xl) 60 mg DAILY PO  Last administered on 4/28/22at 15:53; 

Start 4/28/22 at 10:00;  Stop 4/29/22 at 07:15;  Status DC


Heparin Sodium (Porcine) (Heparin Sodium) 5,000 unit Q8HRS SQ  Last administered

on 4/29/22at 06:27;  Start 4/28/22 at 14:00;  Stop 4/29/22 at 08:49;  Status DC


Nifedipine (Procardia Xl) 90 mg DAILY PO  Last administered on 4/29/22at 08:26; 

Start 4/29/22 at 09:00;  Stop 4/29/22 at 09:12;  Status DC


Nifedipine (Procardia Xl) 120 mg DAILY PO  Last administered on 5/3/22at 07:57; 

Start 4/29/22 at 09:15


Sodium Chloride 1,000 ml @  1,000 mls/hr Q1H PRN IV hypotension;  Start 4/30/22 

at 10:45;  Stop 4/30/22 at 16:44;  Status DC


Sodium Chloride 1,000 ml @  400 mls/hr Q2H30M PRN IV PATENCY;  Start 4/30/22 at 

10:45;  Stop 4/30/22 at 22:44;  Status DC


Info (PHARMACY MONITORING -- do not chart) 1 each PRN DAILY  PRN MC SEE 

COMMENTS;  Start 4/30/22 at 10:45;  Status UNV


Hydralazine HCl (Apresoline) 25 mg TID PO  Last administered on 5/3/22at 07:57; 

Start 5/2/22 at 10:30;  Stop 5/3/22 at 08:51;  Status DC


Hydralazine HCl (Apresoline) 50 mg TID PO  Last administered on 5/3/22at 21:41; 

Start 5/3/22 at 09:00


Furosemide (Lasix) 40 mg 1X  ONCE IVP  Last administered on 5/3/22at 14:42;  

Start 5/3/22 at 14:45;  Stop 5/3/22 at 14:46;  Status DC





Active Scripts


Active


Levemir Flextouch (Insulin Detemir) 100 Unit/1 Ml Insuln.pen 40 Units SQ QHS 30 

Days


Novolog Flexpen (Insulin Aspart) 100 Unit/1 Ml Insuln.pen 15 Units SQ TIDAC 30 

Days


Reported


Tri-Sprintec (Norgestimate-Ethinyl Estradiol) 1 Each Tablet 1 Tab PO DAILY


Spironolactone 50 Mg Tablet 1 Tab PO DAILY


Montelukast Sodium Tablet  ** (Montelukast Sodium) 10 Mg Tablet 10 Mg PO DAILY


Losartan Potassium 100 Mg Tablet 100 Mg PO DAILY


Vitals/I & O





Vital Sign - Last 24 Hours








 5/3/22 5/3/22 5/3/22 5/3/22





 10:16 10:51 14:42 14:58


 


Temp  98.1  98.2





  98.1  98.2


 


Pulse 95 100 100 100


 


Resp  16  16


 


B/P (MAP) 190/82 197/82 (120) 188/82 205/91 (129)


 


Pulse Ox  96  95


 


O2 Delivery  Room Air  Room Air


 


    





    





 5/3/22 5/3/22 5/3/22 5/3/22





 19:55 21:40 21:41 23:35


 


Temp 97.7   97.8





 97.7   97.8


 


Pulse 106  106 103


 


Resp 20   20


 


B/P (MAP) 193/93 (126)  193/93 203/94 (130)


 


Pulse Ox 97   97


 


O2 Delivery Room Air Room Air  Room Air


 


    





    





 5/4/22 5/4/22  





 03:11 07:00  


 


Temp 97.9 97.8  





 97.9 97.8  


 


Pulse 99 100  


 


Resp 20 16  


 


B/P (MAP) 201/76 (117) 187/87 (120)  


 


Pulse Ox 96 99  


 


O2 Delivery Room Air Room Air  














Intake and Output   


 


 5/3/22 5/3/22 5/4/22





 15:00 23:00 07:00


 


Intake Total 560 ml 310 ml 600 ml


 


Output Total 600 ml 500 ml 550 ml


 


Balance -40 ml -190 ml 50 ml











Justicifation of Admission Dx:


Justifications for Admission:


Justification of Admission Dx:  Yes


Stroke - Ischemic:  Stroke-Ischemic











ANGÉLICA MOYER MD            May 4, 2022 08:36

## 2022-05-04 NOTE — PDOC
DATE OF SERVICE


DATE: 5/4/22 


TIME: 09:12





SUBJECTIVE


ROS


Denies SOB  ,  No N/V. States feeling tired





OBJECTIVE


Vital Signs





Vital Signs








  Date Time  Temp Pulse Resp B/P (MAP) Pulse Ox O2 Delivery O2 Flow Rate FiO2


 


5/4/22 08:35  100  187/87    


 


5/4/22 07:00 97.8  16  99 Room Air  





 97.8       








I & 0











Intake and Output 


 


 5/4/22





 07:00


 


Intake Total 1470 ml


 


Output Total 1650 ml


 


Balance -180 ml


 


 


 


Intake Oral 1470 ml


 


Output Urine Total 1650 ml


 


# Voids 4











PHYSICAL EXAM


Physical Exam


GENERAL:  Morbidly Obese ,  


HEEN  Normocephalic, atraumatic 


NECK:  Supple.


HEART:   normal first and second heart sounds.  No gallop, rub or murmur.


LUNGS :  Clear to auscultation, decreased at bases, non labored  


ABDOMEN:  Distended, soft, nontender.


NEUROLOGIC:  Grossly Normal, Moves all 4 extremities .  She did complain of 

numbness in her left side of the face without any obvious motor deficit.


PSYCH COOPERATIVE 


EXT  LE  edema  + 


  Malhotra + , No CVA or SP tenderness  


DERM No Rash








DIAGNOSIS/ASSESSMENT


Assessment & Plan





MICHELLE -atn   / Vomiting  /Poor po intake/ IV Contrast / Hx of NSAID use   . 

Baseline Cr normal in 2017 per [facility] records. No Interval labs available  .UA 

unremarkable, US Unremarkable, 


Started on dialysis on 4/28  ,2 nd treatment  4/30   .   . Supportive care   ,  

Monitor for Renal recovery,  Reports Increased UOP since I gave her Lasix x 1  

on 5/3.   Strict I/O  , Daily standing weight  Cr trended down, stable    .no 

indication for dialysis today    





CKD - per Primary's note, No interval labs. Suspect CKD 2/2 DM and Uncontrolled 

HTN 





HTN Urgency   -   multiple antihypertensive medications at home . Renal Duplex 

Normal   . BP better 





Hypertensive encephalopathy, also has a small left lateral liset infarct 

extending to the left brachium pontis- NEUROLOGY FOLLOWING  . 





Type 2 Diabetes mellitus. she stopped Insulin on her own recently  





Bronchial asthma





COMMENT/RELEVANT DATA


Meds





Current Medications








 Medications


  (Trade)  Dose


 Ordered  Sig/Kavitha  Start Time


 Stop Time Status Last Admin


Dose Admin


 


 Acetaminophen


  (Tylenol)  650 mg  PRN Q6HRS  PRN  4/25/22 16:45


    5/3/22 21:42


650 MG


 


 Aspirin


  (Aspirin Rectal


 Supp)  300 mg  PRN DAILY  PRN  4/25/22 16:45


     





 


 Aspirin


  (Ecotrin)  325 mg  DAILYWBKFT  4/25/22 17:00


    5/4/22 08:34


325 MG


 


 Atorvastatin


 Calcium


  (Lipitor)  80 mg  QHS  4/25/22 21:00


    5/3/22 21:41


80 MG


 


 Enoxaparin Sodium


  (Lovenox 60mg


 Syringe)  60 mg  Q24H  4/26/22 17:00


 4/28/22 11:36 DC 4/26/22 17:41


60 MG


 


 Enoxaparin Sodium


  (Lovenox Per


 Pharmacy


 Prophylaxis


 Dosing)  1 each  PRN DAILY  PRN  4/26/22 16:00


 4/28/22 11:43 DC  





 


 Furosemide


  (Lasix)  40 mg  1X  ONCE  5/3/22 14:45


 5/3/22 14:46 DC 5/3/22 14:42


40 MG


 


 Heparin Sodium


  (Porcine)


  (Heparin Sodium)  5,000 unit  Q8HRS  4/28/22 14:00


 4/29/22 08:49 DC 4/29/22 06:27


5,000 UNIT


 


 Hydralazine HCl


  (Apresoline)  50 mg  TID  5/3/22 09:00


    5/4/22 08:34


50 MG


 


 Info


  (PHARMACY


 MONITORING -- do


 not chart)  1 each  PRN DAILY  PRN  4/30/22 10:45


   UNV  





 


 Insulin Glargine


  (Lantus Syringe)  40 unit  1X  ONCE  4/24/22 23:00


 4/24/22 23:01 DC 4/24/22 22:21


40 UNIT


 


 Insulin Human


 Lispro


  (HumaLOG)  20 units  TIDWMEALS  4/26/22 08:45


    5/4/22 08:36


20 UNITS


 


 Lidocaine HCl


  (Buffered


 Lidocaine 1%)  6 ml  1X  ONCE  4/27/22 12:15


 4/27/22 12:16 DC  





 


 Losartan Potassium


  (Cozaar)  100 mg  DAILY  4/25/22 09:00


 4/26/22 10:31 DC 4/26/22 09:28


100 MG


 


 Montelukast Sodium


  (Singulair)  10 mg  DAILY  4/25/22 09:00


    5/4/22 08:34


10 MG


 


 Nicardipine HCl


 50 mg/Sodium


 Chloride  250 ml @ 


 25 mls/hr  CONT PRN  PRN  4/25/22 16:45


 4/30/22 14:30 DC 4/29/22 20:30


37.5 MLS/HR


 


 Nifedipine


  (Procardia Xl)  120 mg  DAILY  4/29/22 09:15


    5/4/22 08:35


120 MG


 


 Ondansetron HCl


  (Zofran Odt)  4 mg  PRN Q6HRS  PRN  4/26/22 15:00


    5/3/22 20:52


4 MG


 


 Ondansetron HCl


  (Zofran)  4 mg  PRN Q4HRS  PRN  4/27/22 10:15


    5/1/22 15:23


4 MG


 


 Sodium Chloride  1,000 ml @ 


 400 mls/hr  Q2H30M PRN  4/30/22 10:45


 4/30/22 22:44 DC  





 


 Sodium Chloride


  (Ayr Saline


 Nasal)  1 kelly  PRN DAILY  PRN  4/27/22 10:15


    4/28/22 09:05


1 KELLY


 


 Spironolactone


  (Aldactone)  50 mg  DAILY  4/25/22 09:00


 4/28/22 09:48 DC 4/28/22 09:03


50 MG


 


 Zolpidem Tartrate


  (Ambien)  5 mg  PRN QHS  PRN  4/24/22 21:45


    5/2/22 21:01


5 MG








Lab





Laboratory Tests








Test


 5/3/22


11:37 5/3/22


16:43 5/3/22


20:47 5/4/22


06:00


 


Glucose (Fingerstick)


 79 mg/dL


(70-99) 81 mg/dL


(70-99) 118 mg/dL


(70-99) 





 


Sodium Level


 


 


 


 136 mmol/L


(136-145)


 


Potassium Level


 


 


 


 4.7 mmol/L


(3.5-5.1)


 


Chloride Level


 


 


 


 101 mmol/L


()


 


Carbon Dioxide Level


 


 


 


 26 mmol/L


(21-32)


 


Anion Gap    9 (6-14) 


 


Blood Urea Nitrogen


 


 


 


 23 mg/dL


(7-20)


 


Creatinine


 


 


 


 2.4 mg/dL


(0.6-1.0)


 


Estimated GFR


(Cockcroft-Gault) 


 


 


 22.2 





 


Glucose Level


 


 


 


 124 mg/dL


(70-99)


 


Calcium Level


 


 


 


 8.8 mg/dL


(8.5-10.1)


 


Test


 5/4/22


07:15 


 


 





 


Glucose (Fingerstick)


 121 mg/dL


(70-99) 


 


 











Results


All relevant outside records, renal labs, imaging studies, telemetry/EKG's were 

reviewed.





Soterotion of Admission Dx:


Justifications for Admission:


Justification of Admission Dx:  Yes


Stroke - Ischemic:  Stroke-Ischemic











RAMESH LITTLEJOHN MD                 May 4, 2022 09:12

## 2022-05-04 NOTE — PN
DATE: 05/03/2022

SUBJECTIVE:  The patient is sitting comfortably in her recliner, in no apparent 

respiratory distress.  On questioning her, she stated that she continued to feel

unsteady and cannot stand for a long time.  She continued to need to walk with a

walker.  She stated that her urine output has increased and has been in and out 

of the bathroom multiple times.  Her creatinine is steadily and she was not 

dialyzed yesterday and in fact, her creatinine is improving, it is down to 2.3, 

indicating that her kidney function is improving.



PHYSICAL EXAMINATION:

GENERAL:  When I examined her, she looked well and was clearly in no apparent 

respiratory distress, pale.  No jaundice, cyanosis or thyromegaly.  No jugular 

venous distention.  Mild bilateral lower limb edema.

VITAL SIGNS:  Her heart rate was 104, blood pressure was 170/80, temperature was

98, respiratory rate was 18 and oxygen saturation was 96% on room air.

HEAD, EYES, EARS, NOSE, AND THROAT:  Showed normocephalic, atraumatic.

NECK:  Supple.

HEART:  Normal first and second heart sounds.  No gallop or murmur.

CHEST:  Clear to auscultation.  No crepitation or rhonchi.

ABDOMEN:  Distended, soft, nontender.

NEUROLOGIC:  She is grossly intact.



Her intake was 220, output was 1500.



LABORATORY WORK:  This morning showed that her serum sodium was 137, potassium 

4.7, chloride 103, bicarbonate 25, anion gap of 9, BUN 21, creatinine 2.3.  

Estimated GFR was 23 mL per minute.  Her glucose was 185, calcium was 8.9.  Her 

hemoglobin is 10.



ASSESSMENT:

1.  Hypertensive encephalopathy, for which she was on a Cardene drip that was 

discontinued.  She is now on Procardia 120 mg once a day.  Her blood pressure 

continued to be high and I have added hydralazine.

2.  The patient did complain of tingling and numbness in her left side of the 

face and an MRI showed that she has focal area of acute to subacute ischemia 

involving the left lateral liset extending into the left brachium pontis.  There 

is also an associated cytotoxic edema without significant mass effect or 

hemorrhage.

3.  Acute-on-chronic kidney injury.  Her creatinine is steadily improving.  In 

fact, today her creatinine is down to 2.3.  Her urine output also is increasing 

and she most likely would not need dialysis anymore.

4.  Type 2 diabetes mellitus, seems to be reasonably controlled.

5.  Bronchial asthma, well compensated.

6.  Morbid obesity and obstructive sleep apnea.

7.  Irregular menstrual period, for which she was on oral contraceptive pill 

that we held.

8.  Unsteadiness and debility, for which she will continue with physical 

therapy.



PLAN:  My plan is to continue with PT, OT.  Continue to monitor blood sugar and 

adjust insulin as needed.  I will increase her hydralazine to 50 mg 3 times a 

day to hopefully achieve a better control.  The patient will probably need to be

in a skilled nursing facility to continue the process of rehabilitation.







AMM/SUM/RENAE

DR: AMM/nts   DD: 05/03/2022 08:49

DT: 05/03/2022 23:22   TID: [id number]

## 2022-05-04 NOTE — NUR
SS following up with discharge planning. SS reviewed pt chart and discussed with pt RN. Pt 
is currently on room air. PT/OT recommended acute rehabilitation. Nephrology monitoring for 
renal recovery. Pt considering acute rehabilitation at this time. Referral was sent to [facility], [phone]; fax [phone], and pt accepted pending 
insurance approval. SS will continue to follow for discharge planning.

## 2022-05-04 NOTE — PN
DATE: 05/04/2022

SUBJECTIVE:  The patient is resting, slightly propped up in bed, in no apparent 

respiratory distress.  She is sleepy, but arousable.  On questioning her, denied

any complaint.  Her kidney function remained stable and did not require any 

hemodialysis; however, her blood pressure continued to be suboptimally 

controlled.



PHYSICAL EXAMINATION:

GENERAL:  When I examined her, she looked pale, but not jaundiced or cyanosed, 

no lymphadenopathy, no thyromegaly, no jugular venous distention.  No limb 

edema.

VITAL SIGNS:  Her heart rate was 100, blood pressure was 187/87, temperature was

97.8, respiratory rate was 18 and oxygen saturation was 99% on room air.

HEAD, EYES, EARS, NOSE, AND THROAT:  Normocephalic, atraumatic.

NECK:  Supple.

HEART:  Normal first and second heart sounds.  No gallop or murmur.

CHEST:  Clear to auscultation, no crepitation or rhonchi.

ABDOMEN:  Distended, soft, nontender.

NEUROLOGIC:  She was grossly intact.



Her intake was 900, output was 900.



LABORATORY DATA:  As of this morning showed a serum sodium 136, potassium 4.7, 

chloride 101, bicarbonate 26, anion gap of 9, BUN 23, creatinine 2.4.  Estimated

GFR was 23 mL per minute.  Her glucose 124 and calcium was 8.8.  Her blood sugar

is well controlled.



ASSESSMENT:

1.  Hypertensive encephalopathy, which she was on Cardene drip that was 

discontinued.  She is now on Procardia 120 mg once a day.  Her blood pressure 

continued to be suboptimally controlled and I increased hydralazine to 100 mg 3 

times a day.

2.  The patient did complain of tingling and numbness of her left side of the 

face and an MRI showed that she has focal area of acute or subacute ischemia 

involving the left lateral liset extending into the left brachium pontis.  There 

is also an associated cytotoxic edema without significant mass effect or 

hemorrhage.

3.  Acute on chronic kidney injury.  Her creatinine has plateaued.  In fact, 

today her serum creatinine is 2.4.  She does not require dialysis yesterday and 

she would not to require dialysis today.

4.  Type 2 diabetes mellitus, seems to be reasonably controlled.

5.  Bronchial asthma, clinically quiescent.

6.  Morbid obesity and obstructive sleep apnea.

7.  Her regular menstrual periods, which she was on oral contraceptive pill that

was held.

8.  Unsteadiness and debility, for which she will continue with physical and 

occupational therapy.



PLAN:  To continue with PT, OT.  Continue to monitor blood sugar and adjust 

insulin as needed.  I did increase hydralazine to 100 mg 3 times a day.  We will

continue to monitor her kidney function and urine output, although she seemed to

have kidney function seems has plateaued at her baseline.







AMM/OLIVIA

DR: AMM/nts   DD: 05/04/2022 10:24

DT: 05/04/2022 23:14   TID: [id number]

## 2022-05-05 VITALS — SYSTOLIC BLOOD PRESSURE: 143 MMHG | DIASTOLIC BLOOD PRESSURE: 67 MMHG

## 2022-05-05 VITALS — SYSTOLIC BLOOD PRESSURE: 139 MMHG | DIASTOLIC BLOOD PRESSURE: 66 MMHG

## 2022-05-05 VITALS — DIASTOLIC BLOOD PRESSURE: 74 MMHG | SYSTOLIC BLOOD PRESSURE: 170 MMHG

## 2022-05-05 VITALS — DIASTOLIC BLOOD PRESSURE: 58 MMHG | SYSTOLIC BLOOD PRESSURE: 128 MMHG

## 2022-05-05 VITALS — DIASTOLIC BLOOD PRESSURE: 73 MMHG | SYSTOLIC BLOOD PRESSURE: 162 MMHG

## 2022-05-05 VITALS — SYSTOLIC BLOOD PRESSURE: 133 MMHG | DIASTOLIC BLOOD PRESSURE: 59 MMHG

## 2022-05-05 LAB
ALBUMIN SERPL-MCNC: 2.5 G/DL (ref 3.4–5)
ALBUMIN/GLOB SERPL: 0.6 {RATIO} (ref 1–1.7)
ALP SERPL-CCNC: 141 U/L (ref 46–116)
ALT SERPL-CCNC: 33 U/L (ref 14–59)
ANION GAP SERPL CALC-SCNC: 7 MMOL/L (ref 6–14)
AST SERPL-CCNC: 18 U/L (ref 15–37)
BILIRUB SERPL-MCNC: 0.2 MG/DL (ref 0.2–1)
BUN SERPL-MCNC: 19 MG/DL (ref 7–20)
BUN/CREAT SERPL: 10 (ref 6–20)
CALCIUM SERPL-MCNC: 8.7 MG/DL (ref 8.5–10.1)
CHLORIDE SERPL-SCNC: 102 MMOL/L (ref 98–107)
CO2 SERPL-SCNC: 25 MMOL/L (ref 21–32)
CREAT SERPL-MCNC: 2 MG/DL (ref 0.6–1)
ERYTHROCYTE [DISTWIDTH] IN BLOOD BY AUTOMATED COUNT: 13.5 % (ref 11.5–14.5)
GFR SERPLBLD BASED ON 1.73 SQ M-ARVRAT: 27.5 ML/MIN
GLUCOSE SERPL-MCNC: 170 MG/DL (ref 70–99)
HCT VFR BLD CALC: 31.3 % (ref 36–47)
HGB BLD-MCNC: 10.3 G/DL (ref 12–15.5)
MCH RBC QN AUTO: 29 PG (ref 25–35)
MCHC RBC AUTO-ENTMCNC: 33 G/DL (ref 31–37)
MCV RBC AUTO: 87 FL (ref 79–100)
PLATELET # BLD AUTO: 366 X10^3/UL (ref 140–400)
POTASSIUM SERPL-SCNC: 4.9 MMOL/L (ref 3.5–5.1)
PROT SERPL-MCNC: 7 G/DL (ref 6.4–8.2)
RBC # BLD AUTO: 3.61 X10^6/UL (ref 3.5–5.4)
SODIUM SERPL-SCNC: 134 MMOL/L (ref 136–145)
WBC # BLD AUTO: 10.8 X10^3/UL (ref 4–11)

## 2022-05-05 RX ADMIN — ONDANSETRON PRN MG: 4 TABLET, ORALLY DISINTEGRATING ORAL at 03:41

## 2022-05-05 RX ADMIN — INSULIN GLARGINE SCH UNIT: 100 INJECTION, SOLUTION SUBCUTANEOUS at 20:38

## 2022-05-05 RX ADMIN — ONDANSETRON PRN MG: 4 TABLET, ORALLY DISINTEGRATING ORAL at 19:22

## 2022-05-05 RX ADMIN — INSULIN LISPRO SCH UNITS: 100 INJECTION, SOLUTION INTRAVENOUS; SUBCUTANEOUS at 17:00

## 2022-05-05 RX ADMIN — INSULIN LISPRO SCH UNITS: 100 INJECTION, SOLUTION INTRAVENOUS; SUBCUTANEOUS at 12:09

## 2022-05-05 RX ADMIN — MONTELUKAST SODIUM SCH MG: 10 TABLET, FILM COATED ORAL at 09:05

## 2022-05-05 RX ADMIN — ASPIRIN SCH MG: 325 TABLET, DELAYED RELEASE ORAL at 09:05

## 2022-05-05 RX ADMIN — INSULIN LISPRO SCH UNITS: 100 INJECTION, SOLUTION INTRAVENOUS; SUBCUTANEOUS at 09:11

## 2022-05-05 RX ADMIN — ONDANSETRON PRN MG: 4 TABLET, ORALLY DISINTEGRATING ORAL at 12:43

## 2022-05-05 RX ADMIN — ATORVASTATIN CALCIUM SCH MG: 40 TABLET, FILM COATED ORAL at 20:32

## 2022-05-05 NOTE — PDOC
DATE OF SERVICE


DATE: 5/5/22 


TIME: 09:32





SUBJECTIVE


ROS


Denies SOB  ,  No N/V. States feeling tired





OBJECTIVE


Vital Signs





Vital Signs








  Date Time  Temp Pulse Resp B/P (MAP) Pulse Ox O2 Delivery O2 Flow Rate FiO2


 


5/5/22 09:05  99  139/66    


 


5/5/22 07:00 98.6  16  96 Room Air  





 98.6       








I & 0











Intake and Output 


 


 5/5/22





 07:00


 


Intake Total 860 ml


 


Output Total 1100 ml


 


Balance -240 ml


 


 


 


Intake Oral 860 ml


 


Output Urine Total 1100 ml


 


# Voids 1











PHYSICAL EXAM


Physical Exam


GENERAL:  Morbidly Obese ,  


HEEN  Normocephalic, atraumatic 


NECK:  Supple.


HEART:   normal first and second heart sounds.  No gallop, rub or murmur.


LUNGS :  Clear to auscultation, decreased at bases, non labored  


ABDOMEN:  Distended, soft, nontender.


NEUROLOGIC:  Grossly Normal, Moves all 4 extremities .  She did complain of 

numbness in her left side of the face without any obvious motor deficit.


PSYCH COOPERATIVE 


EXT  LE  edema  + 


  Malhotra + , No CVA or SP tenderness  


DERM No Rash








DIAGNOSIS/ASSESSMENT


Assessment & Plan





MICHELLE -atn   / Vomiting  /Poor po intake/ IV Contrast / Hx of NSAID use   . 

Baseline Cr normal in 2017 per [facility] records. No Interval labs available  .UA 

unremarkable, US Unremarkable, 


Started on dialysis on 4/28  ,2 nd treatment  4/30   .   . Supportive care   

Reports Increased UOP since I gave her Lasix x 1  on 5/3. Will give another dose

today. Creatinine trending down    Strict I/O  , Daily standing weight    .no 

indication for dialysis today    





CKD - per Primary's note, No interval labs. Suspect CKD 2/2 DM and Uncontrolled 

HTN 





HTN Urgency   -   multiple antihypertensive medications at home . Renal Duplex 

Normal   . Can add Labetalol . Dw Dr Chao  .





Hypertensive encephalopathy, also has a small left lateral liset infarct 

extending to the left brachium pontis- NEUROLOGY FOLLOWING  . 





Type 2 Diabetes mellitus. she stopped Insulin on her own recently  





Bronchial asthma





COMMENT/RELEVANT DATA


Meds





Current Medications








 Medications


  (Trade)  Dose


 Ordered  Sig/Kavitha  Start Time


 Stop Time Status Last Admin


Dose Admin


 


 Acetaminophen


  (Tylenol)  650 mg  PRN Q6HRS  PRN  4/25/22 16:45


    5/4/22 20:46


650 MG


 


 Aspirin


  (Aspirin Rectal


 Supp)  300 mg  PRN DAILY  PRN  4/25/22 16:45


     





 


 Aspirin


  (Ecotrin)  325 mg  DAILYWBKFT  4/25/22 17:00


    5/5/22 09:05


325 MG


 


 Atorvastatin


 Calcium


  (Lipitor)  80 mg  QHS  4/25/22 21:00


    5/4/22 20:46


80 MG


 


 Clonidine HCl


  (Catapres Tts-3)  1 patch  WEEKLY  5/4/22 15:30


    5/4/22 15:47


1 PATCH


 


 Enoxaparin Sodium


  (Lovenox 60mg


 Syringe)  60 mg  Q24H  4/26/22 17:00


 4/28/22 11:36 DC 4/26/22 17:41


60 MG


 


 Enoxaparin Sodium


  (Lovenox Per


 Pharmacy


 Prophylaxis


 Dosing)  1 each  PRN DAILY  PRN  4/26/22 16:00


 4/28/22 11:43 DC  





 


 Furosemide


  (Lasix)  40 mg  1X  ONCE  5/5/22 09:45


 5/5/22 09:46   





 


 Heparin Sodium


  (Porcine)


  (Heparin Sodium)  5,000 unit  Q8HRS  4/28/22 14:00


 4/29/22 08:49 DC 4/29/22 06:27


5,000 UNIT


 


 Hydralazine HCl


  (Apresoline)  100 mg  TID  5/4/22 10:30


    5/5/22 09:05


100 MG


 


 Info


  (PHARMACY


 MONITORING -- do


 not chart)  1 each  PRN DAILY  PRN  4/30/22 10:45


   UNV  





 


 Insulin Glargine


  (Lantus Syringe)  40 unit  1X  ONCE  4/24/22 23:00


 4/24/22 23:01 DC 4/24/22 22:21


40 UNIT


 


 Insulin Human


 Lispro


  (HumaLOG)  20 units  TIDWMEALS  4/26/22 08:45


    5/5/22 09:11


20 UNITS


 


 Lidocaine HCl


  (Buffered


 Lidocaine 1%)  6 ml  1X  ONCE  4/27/22 12:15


 4/27/22 12:16 DC  





 


 Losartan Potassium


  (Cozaar)  100 mg  DAILY  4/25/22 09:00


 4/26/22 10:31 DC 4/26/22 09:28


100 MG


 


 Montelukast Sodium


  (Singulair)  10 mg  DAILY  4/25/22 09:00


    5/5/22 09:05


10 MG


 


 Nicardipine HCl


 50 mg/Sodium


 Chloride  250 ml @ 


 25 mls/hr  CONT PRN  PRN  4/25/22 16:45


 4/30/22 14:30 DC 4/29/22 20:30


37.5 MLS/HR


 


 Nifedipine


  (Procardia Xl)  120 mg  DAILY  4/29/22 09:15


    5/5/22 09:04


120 MG


 


 Ondansetron HCl


  (Zofran Odt)  4 mg  PRN Q6HRS  PRN  4/26/22 15:00


    5/5/22 03:41


4 MG


 


 Ondansetron HCl


  (Zofran)  4 mg  PRN Q4HRS  PRN  4/27/22 10:15


    5/1/22 15:23


4 MG


 


 Sodium Chloride  1,000 ml @ 


 400 mls/hr  Q2H30M PRN  4/30/22 10:45


 4/30/22 22:44 DC  





 


 Sodium Chloride


  (Ayr Saline


 Nasal)  1 kelly  PRN DAILY  PRN  4/27/22 10:15


    4/28/22 09:05


1 KELLY


 


 Spironolactone


  (Aldactone)  50 mg  DAILY  4/25/22 09:00


 4/28/22 09:48 DC 4/28/22 09:03


50 MG


 


 Zolpidem Tartrate


  (Ambien)  5 mg  PRN QHS  PRN  4/24/22 21:45


    5/4/22 20:47


5 MG








Lab





Laboratory Tests








Test


 5/4/22


11:15 5/4/22


16:30 5/4/22


20:04 5/5/22


06:30


 


Glucose (Fingerstick)


 119 mg/dL


(70-99) 150 mg/dL


(70-99) 125 mg/dL


(70-99) 





 


White Blood Count


 


 


 


 10.8 x10^3/uL


(4.0-11.0)


 


Red Blood Count


 


 


 


 3.61 x10^6/uL


(3.50-5.40)


 


Hemoglobin


 


 


 


 10.3 g/dL


(12.0-15.5)


 


Hematocrit


 


 


 


 31.3 %


(36.0-47.0)


 


Mean Corpuscular Volume    87 fL () 


 


Mean Corpuscular Hemoglobin    29 pg (25-35) 


 


Mean Corpuscular Hemoglobin


Concent 


 


 


 33 g/dL


(31-37)


 


Red Cell Distribution Width


 


 


 


 13.5 %


(11.5-14.5)


 


Platelet Count


 


 


 


 366 x10^3/uL


(140-400)


 


Sodium Level


 


 


 


 134 mmol/L


(136-145)


 


Potassium Level


 


 


 


 4.9 mmol/L


(3.5-5.1)


 


Chloride Level


 


 


 


 102 mmol/L


()


 


Carbon Dioxide Level


 


 


 


 25 mmol/L


(21-32)


 


Anion Gap    7 (6-14) 


 


Blood Urea Nitrogen


 


 


 


 19 mg/dL


(7-20)


 


Creatinine


 


 


 


 2.0 mg/dL


(0.6-1.0)


 


Estimated GFR


(Cockcroft-Gault) 


 


 


 27.5 





 


BUN/Creatinine Ratio    10 (6-20) 


 


Glucose Level


 


 


 


 170 mg/dL


(70-99)


 


Calcium Level


 


 


 


 8.7 mg/dL


(8.5-10.1)


 


Total Bilirubin


 


 


 


 0.2 mg/dL


(0.2-1.0)


 


Aspartate Amino Transf


(AST/SGOT) 


 


 


 18 U/L (15-37) 





 


Alanine Aminotransferase


(ALT/SGPT) 


 


 


 33 U/L (14-59) 





 


Alkaline Phosphatase


 


 


 


 141 U/L


()


 


Total Protein


 


 


 


 7.0 g/dL


(6.4-8.2)


 


Albumin


 


 


 


 2.5 g/dL


(3.4-5.0)


 


Albumin/Globulin Ratio    0.6 (1.0-1.7) 


 


Test


 5/5/22


07:09 


 


 





 


Glucose (Fingerstick)


 162 mg/dL


(70-99) 


 


 











Results


All relevant outside records, renal labs, imaging studies, telemetry/EKG's were 

reviewed.





Justicifation of Admission Dx:


Justifications for Admission:


Justification of Admission Dx:  Yes


Stroke - Ischemic:  Stroke-Ischemic











RAMESH LITTLEJOHN MD                 May 5, 2022 09:34

## 2022-05-05 NOTE — PN
DATE: 05/05/2022

SUBJECTIVE:  The patient is resting, slightly propped up in bed, in her recliner

in no apparent distress.  On questioning her, denied any complaint.  Nursing 

staff did not voice any concern.  Her blood pressure is much better controlled 

now and her creatinine is actually trending down.  Her serum creatinine this 

morning was down to 2 mg/dL.



PHYSICAL EXAMINATION:

GENERAL:  When I examined her this morning, she looked well and was clearly in 

no apparent respiratory distress.  She is pale, but not jaundiced or cyanosed. 

No thyromegaly.  No jugular venous distention.  Mild bilateral lower limb edema.

VITAL SIGNS:  Her heart rate was 99, blood pressure was 139/66, temperature was 

98.6, respiratory rate was 16 and oxygen saturation was 96% on room air.

HEAD, EYES, EARS, NOSE AND THROAT:  Normocephalic, atraumatic.

NECK:  Supple.

HEART:  Normal first and second heart sounds.  No gallop or murmur.

CHEST:  Clear to auscultation, no crepitation or rhonchi.

ABDOMEN:  Distended, soft, nontender.

NEUROLOGIC:  She is grossly intact.



Her intake was 1470, output was 1650.



LABORATORY DATA:  As of this morning, her white cell count was 10.8, hemoglobin 

10, hematocrit 31, MCV 87 and platelet count 366,000.  Her chemistry showed a 

serum sodium 134, potassium 4.9, chloride 102, bicarbonate 25, anion gap of 7, 

BUN 19, creatinine 2, estimated GFR was 27 mL per minute.  Her glucose 170, 

calcium was 8.7.  Total bilirubin, AST, ALT were normal.  Alkaline phosphatase 

slightly elevated.  Total protein 7, albumin 2.5.



ASSESSMENT:

1.  Hypertensive encephalopathy, for which she was started on Cardene drip that 

was discontinued.  She is now on Procardia 120 mg once a day, hydralazine 100 mg

3 times a day and clonidine patch TTS 0.3 mg topically once a week.  Her blood 

pressure is much better controlled.

2.  The patient did complain of tingling and numbness of her left side of the 

face and an MRI showed that she has a focal area of acute to subacute ischemia 

involving the left lateral liset extending into the left brachium pontis.  There 

is also an associated cytotoxic edema without significant mass effect or 

hemorrhage.

3.  Acute on chronic kidney injury, improving.  Her creatinine is down from 4.2,

down to 2 today.

4.  Type 2 diabetes mellitus, seems to be reasonably controlled.

5.  Bronchial asthma, clinically quiescent.

6.  Morbid obesity and possible obstructive sleep apnea.

7.  She has irregular menstrual periods, for which she was on contraceptive 

pills that was held.

8. Unsteadiness and debility, for which she will continue with physical and 

occupational therapy now that her blood pressure is much better controlled.

9.  Once her transfer authorized to [facility], she probably can 

be discharged to continue the process of rehabilitation there.







AMM/BRENDAN

DR: AMM/nts   DD: 05/05/2022 09:47

DT: 05/05/2022 11:58   TID: [id number]

## 2022-05-05 NOTE — NUR
SS following up with discharge planning. SS reviewed pt chart and discussed with pt RN. Pt 
is currently on room air. PT/OT recommended acute rehabilitation. Pt accepted at [facility], [phone]; fax [phone], pending insurance approval. 
Insurance still pending at this time. SS will continue to follow for discharge planning.

## 2022-05-06 VITALS — SYSTOLIC BLOOD PRESSURE: 142 MMHG | DIASTOLIC BLOOD PRESSURE: 67 MMHG

## 2022-05-06 VITALS — SYSTOLIC BLOOD PRESSURE: 136 MMHG | DIASTOLIC BLOOD PRESSURE: 67 MMHG

## 2022-05-06 VITALS — SYSTOLIC BLOOD PRESSURE: 132 MMHG | DIASTOLIC BLOOD PRESSURE: 96 MMHG

## 2022-05-06 VITALS — DIASTOLIC BLOOD PRESSURE: 47 MMHG | SYSTOLIC BLOOD PRESSURE: 119 MMHG

## 2022-05-06 VITALS — DIASTOLIC BLOOD PRESSURE: 83 MMHG | SYSTOLIC BLOOD PRESSURE: 184 MMHG

## 2022-05-06 VITALS — DIASTOLIC BLOOD PRESSURE: 74 MMHG | SYSTOLIC BLOOD PRESSURE: 151 MMHG

## 2022-05-06 LAB
ANION GAP SERPL CALC-SCNC: 9 MMOL/L (ref 6–14)
BUN SERPL-MCNC: 21 MG/DL (ref 7–20)
CALCIUM SERPL-MCNC: 9 MG/DL (ref 8.5–10.1)
CHLORIDE SERPL-SCNC: 104 MMOL/L (ref 98–107)
CO2 SERPL-SCNC: 27 MMOL/L (ref 21–32)
CREAT SERPL-MCNC: 2.2 MG/DL (ref 0.6–1)
GFR SERPLBLD BASED ON 1.73 SQ M-ARVRAT: 24.6 ML/MIN
GLUCOSE SERPL-MCNC: 197 MG/DL (ref 70–99)
POTASSIUM SERPL-SCNC: 5 MMOL/L (ref 3.5–5.1)
SODIUM SERPL-SCNC: 140 MMOL/L (ref 136–145)

## 2022-05-06 RX ADMIN — INSULIN LISPRO SCH UNITS: 100 INJECTION, SOLUTION INTRAVENOUS; SUBCUTANEOUS at 12:00

## 2022-05-06 RX ADMIN — INSULIN LISPRO SCH UNITS: 100 INJECTION, SOLUTION INTRAVENOUS; SUBCUTANEOUS at 17:00

## 2022-05-06 RX ADMIN — ASPIRIN SCH MG: 325 TABLET, DELAYED RELEASE ORAL at 08:41

## 2022-05-06 RX ADMIN — LABETALOL HCL SCH MG: 100 TABLET, FILM COATED ORAL at 21:02

## 2022-05-06 RX ADMIN — INSULIN LISPRO SCH UNITS: 100 INJECTION, SOLUTION INTRAVENOUS; SUBCUTANEOUS at 07:32

## 2022-05-06 RX ADMIN — MONTELUKAST SODIUM SCH MG: 10 TABLET, FILM COATED ORAL at 08:42

## 2022-05-06 RX ADMIN — LABETALOL HCL SCH MG: 100 TABLET, FILM COATED ORAL at 13:59

## 2022-05-06 RX ADMIN — ONDANSETRON PRN MG: 4 TABLET, ORALLY DISINTEGRATING ORAL at 23:00

## 2022-05-06 RX ADMIN — INSULIN GLARGINE SCH UNIT: 100 INJECTION, SOLUTION SUBCUTANEOUS at 21:07

## 2022-05-06 RX ADMIN — ATORVASTATIN CALCIUM SCH MG: 40 TABLET, FILM COATED ORAL at 21:00

## 2022-05-06 NOTE — PDOC
DATE OF SERVICE


DATE: 5/6/22 


TIME: 09:11





SUBJECTIVE


ROS


Denies SOB  ,  No N/V. States feeling tired





OBJECTIVE


Vital Signs





Vital Signs








  Date Time  Temp Pulse Resp B/P (MAP) Pulse Ox O2 Delivery O2 Flow Rate FiO2


 


5/6/22 08:42  99  142/67    


 


5/6/22 07:00 98.1  18  91 Room Air  





 98.1       








I & 0











Intake and Output 


 


 5/6/22





 07:00


 


Intake Total 200 ml


 


Output Total 2100 ml


 


Balance -1900 ml


 


 


 


Intake Oral 200 ml


 


Output Urine Total 2100 ml


 


# Voids 1











PHYSICAL EXAM


Physical Exam


GENERAL:  Morbidly Obese ,  


HEEN  Normocephalic, atraumatic 


NECK:  Supple.


HEART:   normal first and second heart sounds.  No gallop, rub or murmur.


LUNGS :  Clear to auscultation, decreased at bases, non labored  


ABDOMEN:  Distended, soft, nontender.


NEUROLOGIC:  Grossly Normal, Moves all 4 extremities .  She did complain of 

numbness in her left side of the face without any obvious motor deficit.


PSYCH COOPERATIVE 


EXT  LE  edema  + 


  Malhotra + , No CVA or SP tenderness  


DERM No Rash








DIAGNOSIS/ASSESSMENT


Assessment & Plan





MICHELLE -atn   / Vomiting  /Poor po intake/ IV Contrast / Hx of NSAID use   . 

Baseline Cr normal in 2017 per [facility] records. No Interval labs available  .UA 

unremarkable, US Unremarkable, 


Started on dialysis on 4/28  ,2 nd treatment  4/30   .   . Supportive care    

Creatinine  stable     Strict I/O  , Daily standing weight    .no indication for

dialysis today    . DC Temp HDc prior to dc if stable renal function 





CKD - per Primary's note, No interval labs, dont know her baseline  . Suspect 

CKD 2/2 DM and Uncontrolled HTN 





HTN Urgency   -   multiple antihypertensive medications at home . Renal Duplex 

Normal   . Can add Labetalol . Dw Dr Chao  .





Hypertensive encephalopathy, also has a small left lateral liset infarct 

extending to the left brachium pontis- NEUROLOGY FOLLOWING  . 





Type 2 Diabetes mellitus. she stopped Insulin on her own recently  





Bronchial asthma





COMMENT/RELEVANT DATA


Meds





Current Medications








 Medications


  (Trade)  Dose


 Ordered  Sig/Kavitha  Start Time


 Stop Time Status Last Admin


Dose Admin


 


 Acetaminophen


  (Tylenol)  650 mg  PRN Q6HRS  PRN  4/25/22 16:45


    5/4/22 20:46


650 MG


 


 Aspirin


  (Aspirin Rectal


 Supp)  300 mg  PRN DAILY  PRN  4/25/22 16:45


     





 


 Aspirin


  (Ecotrin)  325 mg  DAILYWBKFT  4/25/22 17:00


    5/6/22 08:41


325 MG


 


 Atorvastatin


 Calcium


  (Lipitor)  80 mg  QHS  4/25/22 21:00


    5/5/22 20:32


80 MG


 


 Clonidine HCl


  (Catapres Tts-3)  1 patch  WEEKLY  5/4/22 15:30


    5/4/22 15:47


1 PATCH


 


 Enoxaparin Sodium


  (Lovenox 60mg


 Syringe)  60 mg  Q24H  4/26/22 17:00


 4/28/22 11:36 DC 4/26/22 17:41


60 MG


 


 Enoxaparin Sodium


  (Lovenox Per


 Pharmacy


 Prophylaxis


 Dosing)  1 each  PRN DAILY  PRN  4/26/22 16:00


 4/28/22 11:43 DC  





 


 Furosemide


  (Lasix)  40 mg  1X  ONCE  5/5/22 09:45


 5/5/22 09:46 DC 5/5/22 11:04


40 MG


 


 Heparin Sodium


  (Porcine)


  (Heparin Sodium)  5,000 unit  Q8HRS  4/28/22 14:00


 4/29/22 08:49 DC 4/29/22 06:27


5,000 UNIT


 


 Hydralazine HCl


  (Apresoline)  100 mg  TID  5/4/22 10:30


    5/6/22 08:42


100 MG


 


 Info


  (PHARMACY


 MONITORING -- do


 not chart)  1 each  PRN DAILY  PRN  4/30/22 10:45


   UNV  





 


 Insulin Glargine


  (Lantus Syringe)  40 unit  1X  ONCE  4/24/22 23:00


 4/24/22 23:01 DC 4/24/22 22:21


40 UNIT


 


 Insulin Human


 Lispro


  (HumaLOG)  20 units  TIDWMEALS  4/26/22 08:45


    5/5/22 12:09


20 UNITS


 


 Lidocaine HCl


  (Buffered


 Lidocaine 1%)  6 ml  1X  ONCE  4/27/22 12:15


 4/27/22 12:16 DC  





 


 Losartan Potassium


  (Cozaar)  100 mg  DAILY  4/25/22 09:00


 4/26/22 10:31 DC 4/26/22 09:28


100 MG


 


 Montelukast Sodium


  (Singulair)  10 mg  DAILY  4/25/22 09:00


    5/6/22 08:42


10 MG


 


 Nicardipine HCl


 50 mg/Sodium


 Chloride  250 ml @ 


 25 mls/hr  CONT PRN  PRN  4/25/22 16:45


 4/30/22 14:30 DC 4/29/22 20:30


37.5 MLS/HR


 


 Nifedipine


  (Procardia Xl)  120 mg  DAILY  4/29/22 09:15


    5/6/22 08:41


120 MG


 


 Ondansetron HCl


  (Zofran Odt)  4 mg  PRN Q6HRS  PRN  4/26/22 15:00


    5/5/22 19:22


4 MG


 


 Ondansetron HCl


  (Zofran)  4 mg  PRN Q4HRS  PRN  4/27/22 10:15


    5/1/22 15:23


4 MG


 


 Sodium Chloride  1,000 ml @ 


 400 mls/hr  Q2H30M PRN  4/30/22 10:45


 4/30/22 22:44 DC  





 


 Sodium Chloride


  (Ayr Saline


 Nasal)  1 eklly  PRN DAILY  PRN  4/27/22 10:15


    4/28/22 09:05


1 KELLY


 


 Spironolactone


  (Aldactone)  50 mg  DAILY  4/25/22 09:00


 4/28/22 09:48 DC 4/28/22 09:03


50 MG


 


 Zolpidem Tartrate


  (Ambien)  5 mg  PRN QHS  PRN  4/24/22 21:45


    5/4/22 20:47


5 MG








Lab





Laboratory Tests








Test


 5/5/22


11:23 5/5/22


16:50 5/5/22


20:40 5/6/22


07:12


 


Glucose (Fingerstick)


 147 mg/dL


(70-99) 106 mg/dL


(70-99) 134 mg/dL


(70-99) 91 mg/dL


(70-99)








Results


All relevant outside records, renal labs, imaging studies, telemetry/EKG's were 

reviewed.





Justicifation of Admission Dx:


Justifications for Admission:


Justification of Admission Dx:  Yes


Stroke - Ischemic:  Stroke-Ischemic











RAMESH LITTLEJOHN MD                 May 6, 2022 09:12

## 2022-05-06 NOTE — NUR
SS following up with discharge planning. SS reviewed pt chart and discussed with pt RN. Pt 
is currently on room air. PT/OT recommended acute rehabilitation on 5/2/2022 but were unable 
to follow up due to increased blood pressure issues. Pt accepted at [facility], [phone]; fax [phone]. Insurance requesting peer to peer 
at phone number [phone]. Dr. Chao notified. Dr. Chao discussed case with PT and 
requested pt be treated today. PT assessed pt today. Dr. Chao completing peer to peer today. 
Pt requesting referrals to either [facility], [phone]; fax [phone], or 
[facility], [phone]; fax [phone], if insurance will not 
approve [facility]. Referrals sent as requested. COVID19 PCR test requested. SS will 
continue to follow for discharge planning.

## 2022-05-06 NOTE — PDOC
PROGRESS NOTES


Date of Service


DATE: 5/6/22 


TIME: 09:45


Assessment


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  Has left

facial numbness still, needs roller walker to ambulate


Hypertensive encephalopathy, blood pressure still running very high, could not 

participate in physical therapy yesterday


Acute-on-chronic kidney injury, had CT angiogram of chest at [facility].  

Started dialysis, she has not needed it in the last 2 days


History of diabetes, hypertension, hyperlipidemia, noncompliant, asthma, sleep 

apnea


Echocardiogram noted, I do not think she needs a transesophageal echocardiogram,

the location of the stroke is unlikely cardiac-source


Plan


Aspirin


High-dose statin


Treatment of hypertension. Tightened parameters back to 150/90 starting 4/27


Rehabilitation modalities


Agree with transfer to [facility] rehab


Subjective


No new complaints


Objective





Vital Signs








  Date Time  Temp Pulse Resp B/P (MAP) Pulse Ox O2 Delivery O2 Flow Rate FiO2


 


5/6/22 08:42  99  142/67    


 


5/6/22 07:00 98.1  18  91 Room Air  





 98.1       














Intake and Output 


 


 5/6/22





 07:00


 


Intake Total 200 ml


 


Output Total 2100 ml


 


Balance -1900 ml


 


 


 


Intake Oral 200 ml


 


Output Urine Total 2100 ml


 


# Voids 1








PHYSICAL EXAM


Alert. Oriented to time, place and person.


PERRL.


EOMI.


CN: Slight left cheek sensory loss, slight left central facial weakness, 

otherwise no focal findings.


Muscle tone: normal.


Muscle strength: 4/5


DTR: 2+


Plantar reflex: Flexor


Gait: not examined in bed.


Sensory exam: no abnormal findings.


No cerebellar signs elicited.


Review of Relevant


I have reviewed the following items lana (where applicable) has been applied.


Labs





Laboratory Tests








Test


 5/4/22


11:15 5/4/22


16:30 5/4/22


20:04 5/5/22


06:30


 


Glucose (Fingerstick)


 119 mg/dL


(70-99) 150 mg/dL


(70-99) 125 mg/dL


(70-99) 





 


White Blood Count


 


 


 


 10.8 x10^3/uL


(4.0-11.0)


 


Red Blood Count


 


 


 


 3.61 x10^6/uL


(3.50-5.40)


 


Hemoglobin


 


 


 


 10.3 g/dL


(12.0-15.5)


 


Hematocrit


 


 


 


 31.3 %


(36.0-47.0)


 


Mean Corpuscular Volume    87 fL () 


 


Mean Corpuscular Hemoglobin    29 pg (25-35) 


 


Mean Corpuscular Hemoglobin


Concent 


 


 


 33 g/dL


(31-37)


 


Red Cell Distribution Width


 


 


 


 13.5 %


(11.5-14.5)


 


Platelet Count


 


 


 


 366 x10^3/uL


(140-400)


 


Sodium Level


 


 


 


 134 mmol/L


(136-145)


 


Potassium Level


 


 


 


 4.9 mmol/L


(3.5-5.1)


 


Chloride Level


 


 


 


 102 mmol/L


()


 


Carbon Dioxide Level


 


 


 


 25 mmol/L


(21-32)


 


Anion Gap    7 (6-14) 


 


Blood Urea Nitrogen


 


 


 


 19 mg/dL


(7-20)


 


Creatinine


 


 


 


 2.0 mg/dL


(0.6-1.0)


 


Estimated GFR


(Cockcroft-Gault) 


 


 


 27.5 





 


BUN/Creatinine Ratio    10 (6-20) 


 


Glucose Level


 


 


 


 170 mg/dL


(70-99)


 


Calcium Level


 


 


 


 8.7 mg/dL


(8.5-10.1)


 


Total Bilirubin


 


 


 


 0.2 mg/dL


(0.2-1.0)


 


Aspartate Amino Transf


(AST/SGOT) 


 


 


 18 U/L (15-37) 





 


Alanine Aminotransferase


(ALT/SGPT) 


 


 


 33 U/L (14-59) 





 


Alkaline Phosphatase


 


 


 


 141 U/L


()


 


Total Protein


 


 


 


 7.0 g/dL


(6.4-8.2)


 


Albumin


 


 


 


 2.5 g/dL


(3.4-5.0)


 


Albumin/Globulin Ratio    0.6 (1.0-1.7) 


 


Test


 5/5/22


07:09 5/5/22


11:23 5/5/22


16:50 5/5/22


20:40


 


Glucose (Fingerstick)


 162 mg/dL


(70-99) 147 mg/dL


(70-99) 106 mg/dL


(70-99) 134 mg/dL


(70-99)


 


Test


 5/6/22


07:12 


 


 





 


Glucose (Fingerstick)


 91 mg/dL


(70-99) 


 


 











Laboratory Tests








Test


 5/5/22


11:23 5/5/22


16:50 5/5/22


20:40 5/6/22


07:12


 


Glucose (Fingerstick)


 147 mg/dL


(70-99) 106 mg/dL


(70-99) 134 mg/dL


(70-99) 91 mg/dL


(70-99)








Medications





Current Medications


Nicardipine HCl 50 mg/Sodium Chloride 250 ml @  25 mls/hr CONT  PRN IV PER 

PROTOCOL Last administered on 4/25/22at 15:53;  Start 4/24/22 at 21:45;  Stop 

4/25/22 at 16:43;  Status DC


Nifedipine (Procardia Xl) 30 mg DAILY PO  Last administered on 4/28/22at 09:04; 

Start 4/25/22 at 09:00;  Stop 4/28/22 at 09:48;  Status DC


Zolpidem Tartrate (Ambien) 5 mg PRN QHS  PRN PO INSOMNIA Last administered on 

5/4/22at 20:47;  Start 4/24/22 at 21:45


Montelukast Sodium (Singulair) 10 mg DAILY PO  Last administered on 5/6/22at 

08:42;  Start 4/25/22 at 09:00


Insulin Human Lispro (HumaLOG) 15 units TIDWMEALS SQ  Last administered on 

4/25/22at 18:30;  Start 4/25/22 at 08:00;  Stop 4/26/22 at 08:47;  Status DC


Insulin Glargine (Lantus Syringe) 40 unit QHS SQ  Last administered on 5/5/22at 

20:38;  Start 4/25/22 at 21:00


Losartan Potassium (Cozaar) 100 mg DAILY PO  Last administered on 4/26/22at 

09:28;  Start 4/25/22 at 09:00;  Stop 4/26/22 at 10:31;  Status DC


Spironolactone (Aldactone) 50 mg DAILY PO  Last administered on 4/28/22at 09:03;

 Start 4/25/22 at 09:00;  Stop 4/28/22 at 09:48;  Status DC


Insulin Glargine (Lantus Syringe) 40 unit 1X  ONCE SQ  Last administered on 

4/24/22at 22:21;  Start 4/24/22 at 23:00;  Stop 4/24/22 at 23:01;  Status DC


Sodium Chloride 1,000 ml @  25 mls/hr Q24H IV  Last administered on 4/28/22at 

09:55;  Start 4/25/22 at 15:15;  Stop 4/30/22 at 14:30;  Status DC


Nicardipine HCl 50 mg/Sodium Chloride 250 ml @  25 mls/hr CONT PRN  PRN IV PER 

PROTOCOL Last administered on 4/29/22at 20:30;  Start 4/25/22 at 16:45;  Stop 

4/30/22 at 14:30;  Status DC


Atorvastatin Calcium (Lipitor) 80 mg QHS PO  Last administered on 5/5/22at 

20:32;  Start 4/25/22 at 21:00


Acetaminophen (Tylenol) 650 mg PRN Q6HRS  PRN PO MILD PAIN / TEMP > 100.3'F Last

administered on 5/4/22at 20:46;  Start 4/25/22 at 16:45


Aspirin (Ecotrin) 325 mg DAILYWBKFT PO  Last administered on 5/6/22at 08:41;  

Start 4/25/22 at 17:00


Aspirin (Aspirin Rectal Supp) 300 mg PRN DAILY  PRN NC IF UNABLE TO TAKE PO;  

Start 4/25/22 at 16:45


Insulin Human Lispro (HumaLOG) 20 units TIDWMEALS SQ  Last administered on 5/5/ 22at 12:09;  Start 4/26/22 at 08:45


Ondansetron HCl (Zofran Odt) 4 mg PRN Q6HRS  PRN PO NAUSEA/VOMITING Last 

administered on 5/5/22at 19:22;  Start 4/26/22 at 15:00


Enoxaparin Sodium (Lovenox Per Pharmacy Prophylaxis Dosing) 1 each PRN DAILY  

PRN MC SEE COMMENTS;  Start 4/26/22 at 16:00;  Stop 4/28/22 at 11:43;  Status DC


Enoxaparin Sodium (Lovenox 60mg Syringe) 60 mg Q24H SQ  Last administered on 

4/26/22at 17:41;  Start 4/26/22 at 17:00;  Stop 4/28/22 at 11:36;  Status DC


Ondansetron HCl (Zofran) 4 mg PRN Q4HRS  PRN IVP NAUSEA/VOMITING Last 

administered on 5/1/22at 15:23;  Start 4/27/22 at 10:15


Sodium Chloride (Ayr Saline Nasal) 1 kelly PRN DAILY  PRN NS NASAL CONGESTION Last

administered on 4/28/22at 09:05;  Start 4/27/22 at 10:15


Lidocaine HCl (Buffered Lidocaine 1%) 3 ml STK-MED ONCE .ROUTE ;  Start 4/27/22 

at 11:30;  Stop 4/27/22 at 11:30;  Status DC


Lidocaine HCl (Buffered Lidocaine 1%) 6 ml 1X  ONCE INJ ;  Start 4/27/22 at 

12:15;  Stop 4/27/22 at 12:16;  Status DC


Sodium Chloride 1,000 ml @  1,000 mls/hr Q1H PRN IV hypotension;  Start 4/28/22 

at 08:30;  Stop 4/28/22 at 14:29;  Status DC


Sodium Chloride 1,000 ml @  400 mls/hr Q2H30M PRN IV PATENCY;  Start 4/28/22 at 

08:30;  Stop 4/28/22 at 20:29;  Status DC


Info (PHARMACY MONITORING -- do not chart) 1 each PRN DAILY  PRN MC SEE 

COMMENTS;  Start 4/28/22 at 08:30


Info (PHARMACY MONITORING -- do not chart) 1 each PRN DAILY  PRN MC SEE 

COMMENTS;  Start 4/28/22 at 08:30;  Stop 4/28/22 at 08:27;  Status DC


Nifedipine (Procardia Xl) 60 mg DAILY PO  Last administered on 4/28/22at 15:53; 

Start 4/28/22 at 10:00;  Stop 4/29/22 at 07:15;  Status DC


Heparin Sodium (Porcine) (Heparin Sodium) 5,000 unit Q8HRS SQ  Last administered

on 4/29/22at 06:27;  Start 4/28/22 at 14:00;  Stop 4/29/22 at 08:49;  Status DC


Nifedipine (Procardia Xl) 90 mg DAILY PO  Last administered on 4/29/22at 08:26; 

Start 4/29/22 at 09:00;  Stop 4/29/22 at 09:12;  Status DC


Nifedipine (Procardia Xl) 120 mg DAILY PO  Last administered on 5/6/22at 08:41; 

Start 4/29/22 at 09:15


Sodium Chloride 1,000 ml @  1,000 mls/hr Q1H PRN IV hypotension;  Start 4/30/22 

at 10:45;  Stop 4/30/22 at 16:44;  Status DC


Sodium Chloride 1,000 ml @  400 mls/hr Q2H30M PRN IV PATENCY;  Start 4/30/22 at 

10:45;  Stop 4/30/22 at 22:44;  Status DC


Info (PHARMACY MONITORING -- do not chart) 1 each PRN DAILY  PRN MC SEE 

COMMENTS;  Start 4/30/22 at 10:45;  Status UNV


Hydralazine HCl (Apresoline) 25 mg TID PO  Last administered on 5/3/22at 07:57; 

Start 5/2/22 at 10:30;  Stop 5/3/22 at 08:51;  Status DC


Hydralazine HCl (Apresoline) 50 mg TID PO  Last administered on 5/4/22at 08:34; 

Start 5/3/22 at 09:00;  Stop 5/4/22 at 10:20;  Status DC


Furosemide (Lasix) 40 mg 1X  ONCE IVP  Last administered on 5/3/22at 14:42;  

Start 5/3/22 at 14:45;  Stop 5/3/22 at 14:46;  Status DC


Hydralazine HCl (Apresoline) 100 mg TID PO  Last administered on 5/6/22at 08:42;

 Start 5/4/22 at 10:30


Clonidine HCl (Catapres Tts-3) 1 patch WEEKLY TD  Last administered on 5/4/22at 

15:47;  Start 5/4/22 at 15:30


Furosemide (Lasix) 40 mg 1X  ONCE IVP  Last administered on 5/5/22at 11:04;  

Start 5/5/22 at 09:45;  Stop 5/5/22 at 09:46;  Status DC





Active Scripts


Active


Levemir Flextouch (Insulin Detemir) 100 Unit/1 Ml Insuln.pen 40 Units SQ QHS 30 

Days


Novolog Flexpen (Insulin Aspart) 100 Unit/1 Ml Insuln.pen 15 Units SQ TIDAC 30 

Days


Reported


Tri-Sprintec (Norgestimate-Ethinyl Estradiol) 1 Each Tablet 1 Tab PO DAILY


Spironolactone 50 Mg Tablet 1 Tab PO DAILY


Montelukast Sodium Tablet  ** (Montelukast Sodium) 10 Mg Tablet 10 Mg PO DAILY


Losartan Potassium 100 Mg Tablet 100 Mg PO DAILY


Vitals/I & O





Vital Sign - Last 24 Hours








 5/5/22 5/5/22 5/5/22 5/5/22





 11:00 14:18 15:00 19:37


 


Temp 98.6  97.2 97.9





 98.6  97.2 97.9


 


Pulse 103 105 97 111


 


Resp 20  18 18


 


B/P (MAP) 162/73 (102) 170/74 170/74 (106) 143/67 (92)


 


Pulse Ox 98  99 97


 


O2 Delivery Room Air  Room Air Room Air


 


    





    





 5/5/22 5/5/22 5/5/22 5/6/22





 20:00 20:33 23:07 03:50


 


Temp   98.1 98.7





   98.1 98.7


 


Pulse  111 109 103


 


Resp   16 16


 


B/P (MAP)  143/67 133/59 (83) 119/47 (71)


 


Pulse Ox   94 94


 


O2 Delivery Room Air  Room Air Room Air


 


    





    





 5/6/22 5/6/22 5/6/22 





 07:00 08:41 08:42 


 


Temp 98.1   





 98.1   


 


Pulse 102 99 99 


 


Resp 18   


 


B/P (MAP) 142/67 (92) 142/67 142/67 


 


Pulse Ox 91   


 


O2 Delivery Room Air   














Intake and Output   


 


 5/5/22 5/5/22 5/6/22





 15:00 23:00 07:00


 


Intake Total  100 ml 100 ml


 


Output Total 1100 ml  1000 ml


 


Balance -1100 ml 100 ml -900 ml











Justicifation of Admission Dx:


Justifications for Admission:


Justification of Admission Dx:  Yes


Stroke - Ischemic:  Stroke-Ischemic











ANGÉLICA MOYER MD            May 6, 2022 09:47

## 2022-05-07 VITALS — SYSTOLIC BLOOD PRESSURE: 118 MMHG | DIASTOLIC BLOOD PRESSURE: 60 MMHG

## 2022-05-07 VITALS — SYSTOLIC BLOOD PRESSURE: 131 MMHG | DIASTOLIC BLOOD PRESSURE: 68 MMHG

## 2022-05-07 VITALS — SYSTOLIC BLOOD PRESSURE: 118 MMHG | DIASTOLIC BLOOD PRESSURE: 66 MMHG

## 2022-05-07 VITALS — DIASTOLIC BLOOD PRESSURE: 62 MMHG | SYSTOLIC BLOOD PRESSURE: 113 MMHG

## 2022-05-07 VITALS — SYSTOLIC BLOOD PRESSURE: 138 MMHG | DIASTOLIC BLOOD PRESSURE: 64 MMHG

## 2022-05-07 VITALS — SYSTOLIC BLOOD PRESSURE: 153 MMHG | DIASTOLIC BLOOD PRESSURE: 79 MMHG

## 2022-05-07 LAB
ANION GAP SERPL CALC-SCNC: 7 MMOL/L (ref 6–14)
BACTERIA #/AREA URNS HPF: (no result) /HPF
BUN SERPL-MCNC: 23 MG/DL (ref 7–20)
CALCIUM SERPL-MCNC: 8.6 MG/DL (ref 8.5–10.1)
CHLORIDE SERPL-SCNC: 104 MMOL/L (ref 98–107)
CO2 SERPL-SCNC: 28 MMOL/L (ref 21–32)
CREAT SERPL-MCNC: 2.3 MG/DL (ref 0.6–1)
CREATININE,RANDOM URINE: 163.8 MG/DL
GFR SERPLBLD BASED ON 1.73 SQ M-ARVRAT: 23.4 ML/MIN
GLUCOSE SERPL-MCNC: 253 MG/DL (ref 70–99)
POTASSIUM SERPL-SCNC: 5 MMOL/L (ref 3.5–5.1)
RBC #/AREA URNS HPF: (no result) /HPF (ref 0–2)
SODIUM SERPL-SCNC: 139 MMOL/L (ref 136–145)
WBC #/AREA URNS HPF: (no result) /HPF (ref 0–4)

## 2022-05-07 RX ADMIN — MONTELUKAST SODIUM SCH MG: 10 TABLET, FILM COATED ORAL at 07:55

## 2022-05-07 RX ADMIN — LABETALOL HCL SCH MG: 100 TABLET, FILM COATED ORAL at 20:42

## 2022-05-07 RX ADMIN — ASPIRIN SCH MG: 325 TABLET, DELAYED RELEASE ORAL at 07:58

## 2022-05-07 RX ADMIN — ONDANSETRON PRN MG: 4 TABLET, ORALLY DISINTEGRATING ORAL at 15:17

## 2022-05-07 RX ADMIN — ACETAMINOPHEN PRN MG: 325 TABLET, FILM COATED ORAL at 19:20

## 2022-05-07 RX ADMIN — LABETALOL HCL SCH MG: 100 TABLET, FILM COATED ORAL at 07:56

## 2022-05-07 RX ADMIN — INSULIN LISPRO SCH UNITS: 100 INJECTION, SOLUTION INTRAVENOUS; SUBCUTANEOUS at 11:45

## 2022-05-07 RX ADMIN — INSULIN LISPRO SCH UNITS: 100 INJECTION, SOLUTION INTRAVENOUS; SUBCUTANEOUS at 17:08

## 2022-05-07 RX ADMIN — INSULIN LISPRO SCH UNITS: 100 INJECTION, SOLUTION INTRAVENOUS; SUBCUTANEOUS at 08:10

## 2022-05-07 RX ADMIN — INSULIN GLARGINE SCH UNIT: 100 INJECTION, SOLUTION SUBCUTANEOUS at 20:47

## 2022-05-07 RX ADMIN — ATORVASTATIN CALCIUM SCH MG: 40 TABLET, FILM COATED ORAL at 20:42

## 2022-05-07 RX ADMIN — ONDANSETRON PRN MG: 4 TABLET, ORALLY DISINTEGRATING ORAL at 23:05

## 2022-05-07 NOTE — PDOC
DATE OF SERVICE:


DOS:


DATE: 5/7/22 


TIME: 12:41





SUBJECTIVE


ROS


Follow-up for MICHELLE +/- ? CKD





Bisi denies any new complaints today.  She does not appear to have eaten much 

of her lunch tray other than her sandwich.





CVS:   no Orthopnea, no CP


RESP:   no SOB, no PARIS


GI:   no Nausea, no Vomiting


:   no Dysuria, no Urgency





OBJECTIVE


Vital Signs





Vital Signs








  Date Time  Temp Pulse Resp B/P (MAP) Pulse Ox O2 Delivery O2 Flow Rate FiO2


 


5/7/22 10:30 99.0 99 18 153/79 (103) 94 Room Air  





 99.0       








I & 0











Intake and Output 


 


 5/7/22





 07:00


 


Intake Total 820 ml


 


Output Total 2000 ml


 


Balance -1180 ml


 


 


 


Intake Oral 820 ml


 


Output Urine Total 2000 ml











PHYSICAL EXAM


Physical Exam


GEN:    Awake, Oriented x 2-3, In no distress morbidly obese


EYES:  Vision Unchanged, Conjunctiva Normal


EN:      No EN Drainage, Mucous Membranes moist


NECK: No visible JVD, or JVP, Supple, no Thyromegaly


CVS:    S1S2, no audible murmur, No Gallop, No Rub,? Edema versus fatty lower 

extremities


RESP: No audible Rales, or rhonchi given obesity, no Acc. Muscle Use


GI:        BS + ve, NO Bruit, Non Tender, Non Distended, morbidly obese abdomen


:      No CVA tenderness, no suprapubic Tenderness





DIAGNOSIS/ASSESSMENT


Assessment & Plan


ARF: Possible ATN.  Differentials as previously discussed. Current fluid and E-

lyte status does not necessitate emergent need for dialysis.  Will re-evaluate 

for dialysis in the am.  Adequate urine output at this time.  UA and sed rate 

will be ordered.  ATN due to correction of persistent malignant hypertension and

resultant alteration of intrarenal hemodynamics cannot be ruled out





CKD - per Primary's note, No interval labs, dont know her baseline  . Suspect 

CKD 2/2 DM and Uncontrolled HTN and obesity cannot be ruled out





HTN Urgency   -   multiple antihypertensive medications at home .  Blood 

pressures adequately controlled currently on present regimen.





Hypertensive encephalopathy, also has a small left lateral liset infarct 

extending to the left brachium pontis- NEUROLOGY FOLLOWING  . 





Type 2 Diabetes mellitus.  Defer to primary team.  Underlying diabetic 

nephropathy cannot be ruled out





Bronchial asthma





COMMENT/RELEVANT DATA


Meds





Current Medications








 Medications


  (Trade)  Dose


 Ordered  Sig/Kavitha  Start Time


 Stop Time Status Last Admin


Dose Admin


 


 Acetaminophen


  (Tylenol)  650 mg  PRN Q6HRS  PRN  4/25/22 16:45


    5/4/22 20:46


650 MG


 


 Aspirin


  (Aspirin Rectal


 Supp)  300 mg  PRN DAILY  PRN  4/25/22 16:45


     





 


 Aspirin


  (Ecotrin)  325 mg  DAILYWBKFT  4/25/22 17:00


    5/7/22 07:58


325 MG


 


 Atorvastatin


 Calcium


  (Lipitor)  80 mg  QHS  4/25/22 21:00


    5/6/22 21:00


80 MG


 


 Clonidine HCl


  (Catapres Tts-3)  1 patch  WEEKLY  5/4/22 15:30


    5/4/22 15:47


1 PATCH


 


 Enoxaparin Sodium


  (Lovenox 60mg


 Syringe)  60 mg  Q24H  4/26/22 17:00


 4/28/22 11:36 DC 4/26/22 17:41


60 MG


 


 Enoxaparin Sodium


  (Lovenox Per


 Pharmacy


 Prophylaxis


 Dosing)  1 each  PRN DAILY  PRN  4/26/22 16:00


 4/28/22 11:43 DC  





 


 Furosemide


  (Lasix)  40 mg  1X  ONCE  5/5/22 09:45


 5/5/22 09:46 DC 5/5/22 11:04


40 MG


 


 Heparin Sodium


  (Porcine)


  (Heparin Sodium)  5,000 unit  Q8HRS  4/28/22 14:00


 4/29/22 08:49 DC 4/29/22 06:27


5,000 UNIT


 


 Hydralazine HCl


  (Apresoline)  100 mg  TID  5/4/22 10:30


    5/7/22 07:58


100 MG


 


 Info


  (PHARMACY


 MONITORING -- do


 not chart)  1 each  PRN DAILY  PRN  4/30/22 10:45


   UNV  





 


 Insulin Glargine


  (Lantus Syringe)  40 unit  1X  ONCE  4/24/22 23:00


 4/24/22 23:01 DC 4/24/22 22:21


40 UNIT


 


 Insulin Human


 Lispro


  (HumaLOG)  20 units  TIDWMEALS  4/26/22 08:45


    5/7/22 11:45


20 UNITS


 


 Labetalol HCl


  (Trandate)  100 mg  BID  5/6/22 13:00


    5/7/22 07:56


100 MG


 


 Lidocaine HCl


  (Buffered


 Lidocaine 1%)  6 ml  1X  ONCE  4/27/22 12:15


 4/27/22 12:16 DC  





 


 Losartan Potassium


  (Cozaar)  100 mg  DAILY  4/25/22 09:00


 4/26/22 10:31 DC 4/26/22 09:28


100 MG


 


 Montelukast Sodium


  (Singulair)  10 mg  DAILY  4/25/22 09:00


    5/7/22 07:55


10 MG


 


 Nicardipine HCl


 50 mg/Sodium


 Chloride  250 ml @ 


 25 mls/hr  CONT PRN  PRN  4/25/22 16:45


 4/30/22 14:30 DC 4/29/22 20:30


37.5 MLS/HR


 


 Nifedipine


  (Procardia Xl)  120 mg  DAILY  4/29/22 09:15


    5/7/22 07:57


120 MG


 


 Ondansetron HCl


  (Zofran Odt)  4 mg  PRN Q6HRS  PRN  4/26/22 15:00


    5/6/22 23:00


4 MG


 


 Ondansetron HCl


  (Zofran)  4 mg  PRN Q4HRS  PRN  4/27/22 10:15


    5/1/22 15:23


4 MG


 


 Sodium Chloride  1,000 ml @ 


 400 mls/hr  Q2H30M PRN  4/30/22 10:45


 4/30/22 22:44 DC  





 


 Sodium Chloride


  (Ayr Saline


 Nasal)  1 kelly  PRN DAILY  PRN  4/27/22 10:15


    4/28/22 09:05


1 KELLY


 


 Spironolactone


  (Aldactone)  50 mg  DAILY  4/25/22 09:00


 4/28/22 09:48 DC 4/28/22 09:03


50 MG


 


 Zolpidem Tartrate


  (Ambien)  5 mg  PRN QHS  PRN  4/24/22 21:45


    5/4/22 20:47


5 MG








Lab





Laboratory Tests








Test


 5/6/22


16:33 5/6/22


20:36 5/7/22


05:45 5/7/22


07:06


 


Glucose (Fingerstick)


 225 mg/dL


(70-99) 228 mg/dL


(70-99) 


 222 mg/dL


(70-99)


 


Sodium Level


 


 


 139 mmol/L


(136-145) 





 


Potassium Level


 


 


 5.0 mmol/L


(3.5-5.1) 





 


Chloride Level


 


 


 104 mmol/L


() 





 


Carbon Dioxide Level


 


 


 28 mmol/L


(21-32) 





 


Anion Gap   7 (6-14)  


 


Blood Urea Nitrogen


 


 


 23 mg/dL


(7-20) 





 


Creatinine


 


 


 2.3 mg/dL


(0.6-1.0) 





 


Estimated GFR


(Cockcroft-Gault) 


 


 23.4 


 





 


Glucose Level


 


 


 253 mg/dL


(70-99) 





 


Calcium Level


 


 


 8.6 mg/dL


(8.5-10.1) 





 


Test


 5/7/22


11:08 


 


 





 


Glucose (Fingerstick)


 180 mg/dL


(70-99) 


 


 











Results


All relevant outside records, renal labs, imaging studies, telemetry/EKG's were 

reviewed.





Justicifation of Admission Dx:


Justifications for Admission:


Justification of Admission Dx:  Yes


Stroke - Ischemic:  Stroke-Ischemic











JIGAR CISSE MD                May 7, 2022 12:44

## 2022-05-07 NOTE — PN
DATE: 05/06/2022

SUBJECTIVE:  The patient is resting, slightly propped up in bed, in no apparent 

respiratory distress.  On questioning her, she denied any complaint.



PHYSICAL EXAMINATION:

GENERAL:  When I examined her, she looked pale, but not jaundiced, cyanosed. No 

thyromegaly.  No jugular venous distention.  No limb edema.

VITAL SIGNS:  Her heart rate was 99, blood pressure is 142/67, temperature was 

98.1, respiratory rate was 18 and oxygen saturation was 91% on room air.

HEAD, EYES, EARS, NOSE, AND THROAT:  Normocephalic, atraumatic.

NECK:  Supple.

HEART:  Showed normal first and second heart sounds.  No gallop or murmur.

CHEST:  Clear to auscultation, no crepitation or rhonchi.

ABDOMEN:  Distended, soft, nontender.

NEUROLOGIC:  She was awake, alert, responding appropriately.  She moves 

extremities without difficulty, although she is mostly wheelchair bound.  She is

able to ambulate with a walker with standby assist.



Her intake was 860, output was 1100. Her chemistry is still pending at the time 

of this dictation.



LABORATORY DATA:  As of yesterday, her white cell count was 10.8, hemoglobin 10,

hematocrit 31, MCV 87 and platelet count 366,000.  Her chemistry as of yesterday

showed that her BUN was 19, creatinine 2.



ASSESSMENT AND PLAN:

1.  Hypertensive encephalopathy, for which she was started on Cardene drip that 

was discontinued.  She is now on Procardia 120 mg once a day, hydralazine 100 mg

3 times a day and clonidine TTS 0.3 patch topically once a week.  Her blood 

pressure is much better controlled with an average blood pressure 140/90.

2.  The patient did complain of tingling and numbness in her left side of the 

face and an MRI showed that she has a focal area of acute to subacute ischemia 

involving the left lateral liset and extending into the left brachium pontis.  

There is also an associated cytotoxic edema without significant mass effect or 

hemorrhage.

3.  Acute on chronic kidney injury, improving.  Her most recent creatinine was 

from 4.2 down to 2 mg.  Today's labs still pending at time of this dictation.

4.  Type 2 diabetes mellitus, seems to be reasonably controlled.

5.  Bronchial asthma, clinically quiescent.

6.  Morbid obesity and possible obstructive sleep apnea.

7.  She has regular menstrual period for which she was on contraceptive pill 

that was held.

8.  Unsteadiness and debility for which continue with physical and occupational 

therapy now that her blood pressure was much better controlled.







AURA/ZEESHAN/NICK

DR: AMM/nts   DD: 05/06/2022 10:00

DT: 05/06/2022 10:31   TID: [id number]

## 2022-05-08 VITALS — SYSTOLIC BLOOD PRESSURE: 143 MMHG | DIASTOLIC BLOOD PRESSURE: 76 MMHG

## 2022-05-08 VITALS — DIASTOLIC BLOOD PRESSURE: 78 MMHG | SYSTOLIC BLOOD PRESSURE: 168 MMHG

## 2022-05-08 VITALS — DIASTOLIC BLOOD PRESSURE: 67 MMHG | SYSTOLIC BLOOD PRESSURE: 113 MMHG

## 2022-05-08 VITALS — DIASTOLIC BLOOD PRESSURE: 72 MMHG | SYSTOLIC BLOOD PRESSURE: 131 MMHG

## 2022-05-08 VITALS — DIASTOLIC BLOOD PRESSURE: 70 MMHG | SYSTOLIC BLOOD PRESSURE: 146 MMHG

## 2022-05-08 VITALS — SYSTOLIC BLOOD PRESSURE: 129 MMHG | DIASTOLIC BLOOD PRESSURE: 68 MMHG

## 2022-05-08 LAB
ANION GAP SERPL CALC-SCNC: 6 MMOL/L (ref 6–14)
BUN SERPL-MCNC: 23 MG/DL (ref 7–20)
CALCIUM SERPL-MCNC: 8.4 MG/DL (ref 8.5–10.1)
CHLORIDE SERPL-SCNC: 103 MMOL/L (ref 98–107)
CO2 SERPL-SCNC: 27 MMOL/L (ref 21–32)
CREAT SERPL-MCNC: 2.3 MG/DL (ref 0.6–1)
GFR SERPLBLD BASED ON 1.73 SQ M-ARVRAT: 23.4 ML/MIN
GLUCOSE SERPL-MCNC: 196 MG/DL (ref 70–99)
POTASSIUM SERPL-SCNC: 5 MMOL/L (ref 3.5–5.1)
SODIUM SERPL-SCNC: 136 MMOL/L (ref 136–145)

## 2022-05-08 RX ADMIN — LABETALOL HCL SCH MG: 100 TABLET, FILM COATED ORAL at 21:28

## 2022-05-08 RX ADMIN — LABETALOL HCL SCH MG: 100 TABLET, FILM COATED ORAL at 08:24

## 2022-05-08 RX ADMIN — INSULIN LISPRO SCH UNITS: 100 INJECTION, SOLUTION INTRAVENOUS; SUBCUTANEOUS at 17:03

## 2022-05-08 RX ADMIN — MONTELUKAST SODIUM SCH MG: 10 TABLET, FILM COATED ORAL at 08:24

## 2022-05-08 RX ADMIN — INSULIN LISPRO SCH UNITS: 100 INJECTION, SOLUTION INTRAVENOUS; SUBCUTANEOUS at 08:26

## 2022-05-08 RX ADMIN — INSULIN GLARGINE SCH UNIT: 100 INJECTION, SOLUTION SUBCUTANEOUS at 21:33

## 2022-05-08 RX ADMIN — INSULIN LISPRO SCH UNITS: 100 INJECTION, SOLUTION INTRAVENOUS; SUBCUTANEOUS at 12:01

## 2022-05-08 RX ADMIN — ASPIRIN SCH MG: 325 TABLET, DELAYED RELEASE ORAL at 08:25

## 2022-05-08 RX ADMIN — ATORVASTATIN CALCIUM SCH MG: 40 TABLET, FILM COATED ORAL at 21:28

## 2022-05-08 RX ADMIN — ONDANSETRON PRN MG: 4 TABLET, ORALLY DISINTEGRATING ORAL at 14:32

## 2022-05-08 NOTE — PN
DATE: 05/08/2022

SUBJECTIVE:  The patient is sitting comfortably in her chair, eating her 

breakfast, in no apparent distress.  On questioning her, she stated that she 

does not feel well, but did actually work with physical therapy yesterday.  Her 

blood pressure is now well controlled.  Her serum creatinine apparently has 

plateaued around 2.3 without any further improvement.  Her blood sugar seems to 

be reasonably controlled. She apparently has worked with physical therapy 

yesterday and physical therapist recommended acute rehab facility.  She 

continued to require a rolling walker for ambulation and safety to complete her 

ADLs.



PHYSICAL EXAMINATION:

GENERAL:  When I saw her this morning, she looked well, pale, but not jaundiced,

cyanosed or thyromegaly.  No jugular venous distention.  No lower limb edema.

VITAL SIGNS:  Her heart rate was 93, blood pressure was 113/67, temperature was 

98, respiratory rate was 20 and oxygen saturation was 95% on room air.

HEAD, EYES, EARS, NOSE, AND THROAT:  Normocephalic, atraumatic.

NECK:  Supple.

HEART:  Showed normal first and second heart sounds.  No gallop, rub or murmur.

CHEST:  Clear to auscultation, no crepitation or rhonchi.

ABDOMEN:  Distended, soft, nontender.

NEUROLOGIC:  She is awake, alert, oriented to time, place and person.  She has 

mild left-sided weakness, but otherwise all her cranial nerves are intact.  She 

moves extremities without difficulty.



Her intake was 820, output was 2000.



LABORATORY DATA:  As of this morning, her serum sodium was 136, potassium 5, 

chloride 103, bicarbonate 27, anion gap of 6, BUN 23, creatinine 2.3.  Estimated

GFR was 23 mL per minute.  Her glucose was 96, calcium was 8.4.  Her most recent

white cell count was 11,000, hemoglobin 10, hematocrit 30, MCV 87 and platelet 

count 366,000.



ASSESSMENT:

1.  Hypertensive encephalopathy, for which she was started on Cardene drip that 

was discontinued.  She is now on Procardia 120 mg once a day, hydralazine 100 mg

3 times a day, clonidine TTS patch topically once a week as well as labetalol 

100 mg twice a day and her blood pressure this morning was 131/70.

2.  The patient did complain of tingling and numbness of her left side of the 

face and an MRI showed that she has a focal area of acute to subacute ischemia 

involving the left lateral liset and extending to the left brachium pontis.  She 

also has an associated cytotoxic edema without significant mass effect or 

hemorrhage.  She does have mild weakness of her and sensory deficit, but no 

other focal deficit.

3.  Acute on chronic kidney injury, that did not require dialysis for the last 

5-6 days.  Her serum creatinine has plateaued around 2.3.

4.  Type 2 diabetes mellitus, seems to be reasonably controlled.

5.  Bronchial asthma, clinically quiescent.

6.  Morbid obesity and possible obstructive sleep apnea.

7.  The patient has irregular menstrual cycles for which she was on oral 

contraceptives that was put on hold.

8.  Debility and weakness for which she continued to require physical and 

occupational therapy.



PLAN:

1.  To continue to monitor her kidney function.  She is unlikely to require any 

hemodialysis.

2.  Continue with antihypertensive medication.

3.  Continue to monitor her blood sugar and adjust insulin as needed.

4.  Continue with physical and occupational therapy and hopefully discharge her 

to [facility]







AMM/PRA

DR: AMM/nts   DD: 05/08/2022 08:21

DT: 05/08/2022 09:57   TID: [id number]

## 2022-05-08 NOTE — PDOC
DATE OF SERVICE:


DOS:


DATE: 5/8/22 


TIME: 12:08





SUBJECTIVE


ROS


Follow-up for MICHELLE +/- CKD





Patient denies any new complaints, has been feeling well overall





CVS:   no Orthopnea, no CP


RESP:   no SOB, no PARIS


GI:   no Nausea, no Vomiting


:   no Dysuria, no Urgency





OBJECTIVE


Vital Signs





Vital Signs








  Date Time  Temp Pulse Resp B/P (MAP) Pulse Ox O2 Delivery O2 Flow Rate FiO2


 


5/8/22 10:47 98.1 90 18 129/68 (88) 95 Room Air  





 98.1       








I & 0











Intake and Output 


 


 5/8/22





 07:00


 


Intake Total 1150 ml


 


Output Total 800 ml


 


Balance 350 ml


 


 


 


Intake Oral 1150 ml


 


Output Urine Total 800 ml











PHYSICAL EXAM


Physical Exam


GEN:    Awake, Oriented x 2-3, In no distress morbidly obese


EYES:  Vision Unchanged, Conjunctiva Normal


EN:      No EN Drainage, Mucous Membranes moist


NECK: No visible JVD, or JVP, Supple, no Thyromegaly


CVS:    S1S2, no audible murmur, No Gallop, No Rub,? Edema versus fatty lower 

extremities


RESP: No audible Rales, or rhonchi given obesity, no Acc. Muscle Use


GI:        BS + ve, NO Bruit, Non Tender, Non Distended, morbidly obese abdomen


:      No CVA tenderness, no suprapubic Tenderness





DIAGNOSIS/ASSESSMENT


Assessment & Plan








ARF: Possible ATN.  Differentials as previously discussed. Current fluid and E-

lyte status does not necessitate emergent need for dialysis.  Will re-evaluate 

for dialysis in the am.  Adequate urine output at this time.  UA and sed rate 

will be ordered.  ATN due to correction of persistent malignant hypertension and

resultant alteration of intrarenal hemodynamics cannot be ruled out





1 g proteinuria: Anticipate she will need an ARB as an outpatient to minimize 

this if noted to be persistent.  Etiologies of the same remain as per CKD 

diagnosis





CKD - per Primary's note, No interval labs, dont know her baseline  . Suspect 

CKD 2/2 DM and Uncontrolled HTN and obesity cannot be ruled out





HTN Urgency   -   multiple antihypertensive medications at home .  Blood 

pressures adequately controlled currently on present regimen.





Hypertensive encephalopathy: Appears to have resolved to the greater extent.  

Also has a small left lateral liset infarct extending to the left brachium 

pontis- NEUROLOGY FOLLOWING  . 





Type 2 Diabetes mellitus.  Defer to primary team.  Underlying diabetic 

nephropathy cannot be ruled out





Bronchial asthma





COMMENT/RELEVANT DATA


Meds





Current Medications








 Medications


  (Trade)  Dose


 Ordered  Sig/Kavitha  Start Time


 Stop Time Status Last Admin


Dose Admin


 


 Acetaminophen


  (Tylenol)  650 mg  PRN Q6HRS  PRN  4/25/22 16:45


    5/7/22 19:20


650 MG


 


 Aspirin


  (Aspirin Rectal


 Supp)  300 mg  PRN DAILY  PRN  4/25/22 16:45


     





 


 Aspirin


  (Ecotrin)  325 mg  DAILYWBKFT  4/25/22 17:00


    5/8/22 08:25


325 MG


 


 Atorvastatin


 Calcium


  (Lipitor)  80 mg  QHS  4/25/22 21:00


    5/7/22 20:42


80 MG


 


 Clonidine HCl


  (Catapres Tts-3)  1 patch  WEEKLY  5/4/22 15:30


    5/4/22 15:47


1 PATCH


 


 Enoxaparin Sodium


  (Lovenox 60mg


 Syringe)  60 mg  Q24H  4/26/22 17:00


 4/28/22 11:36 DC 4/26/22 17:41


60 MG


 


 Enoxaparin Sodium


  (Lovenox Per


 Pharmacy


 Prophylaxis


 Dosing)  1 each  PRN DAILY  PRN  4/26/22 16:00


 4/28/22 11:43 DC  





 


 Furosemide


  (Lasix)  40 mg  1X  ONCE  5/5/22 09:45


 5/5/22 09:46 DC 5/5/22 11:04


40 MG


 


 Heparin Sodium


  (Porcine)


  (Heparin Sodium)  5,000 unit  Q8HRS  4/28/22 14:00


 4/29/22 08:49 DC 4/29/22 06:27


5,000 UNIT


 


 Hydralazine HCl


  (Apresoline)  100 mg  TID  5/4/22 10:30


    5/8/22 09:03


100 MG


 


 Info


  (PHARMACY


 MONITORING -- do


 not chart)  1 each  PRN DAILY  PRN  4/30/22 10:45


   UNV  





 


 Insulin Glargine


  (Lantus Syringe)  40 unit  1X  ONCE  4/24/22 23:00


 4/24/22 23:01 DC 4/24/22 22:21


40 UNIT


 


 Insulin Human


 Lispro


  (HumaLOG)  20 units  TIDWMEALS  4/26/22 08:45


    5/8/22 12:01


20 UNITS


 


 Labetalol HCl


  (Trandate)  100 mg  BID  5/6/22 13:00


    5/8/22 08:24


100 MG


 


 Lidocaine HCl


  (Buffered


 Lidocaine 1%)  6 ml  1X  ONCE  4/27/22 12:15


 4/27/22 12:16 DC  





 


 Losartan Potassium


  (Cozaar)  100 mg  DAILY  4/25/22 09:00


 4/26/22 10:31 DC 4/26/22 09:28


100 MG


 


 Montelukast Sodium


  (Singulair)  10 mg  DAILY  4/25/22 09:00


    5/8/22 08:24


10 MG


 


 Nicardipine HCl


 50 mg/Sodium


 Chloride  250 ml @ 


 25 mls/hr  CONT PRN  PRN  4/25/22 16:45


 4/30/22 14:30 DC 4/29/22 20:30


37.5 MLS/HR


 


 Nifedipine


  (Procardia Xl)  120 mg  DAILY  4/29/22 09:15


    5/8/22 08:25


120 MG


 


 Ondansetron HCl


  (Zofran Odt)  4 mg  PRN Q6HRS  PRN  4/26/22 15:00


    5/7/22 23:05


4 MG


 


 Ondansetron HCl


  (Zofran)  4 mg  PRN Q4HRS  PRN  4/27/22 10:15


    5/1/22 15:23


4 MG


 


 Sodium Chloride  1,000 ml @ 


 400 mls/hr  Q2H30M PRN  4/30/22 10:45


 4/30/22 22:44 DC  





 


 Sodium Chloride


  (Ayr Saline


 Nasal)  1 kelly  PRN DAILY  PRN  4/27/22 10:15


    4/28/22 09:05


1 KELLY


 


 Spironolactone


  (Aldactone)  50 mg  DAILY  4/25/22 09:00


 4/28/22 09:48 DC 4/28/22 09:03


50 MG


 


 Zolpidem Tartrate


  (Ambien)  5 mg  PRN QHS  PRN  4/24/22 21:45


    5/4/22 20:47


5 MG








Lab





Laboratory Tests








Test


 5/7/22


14:10 5/7/22


16:12 5/7/22


20:29 5/7/22


22:45


 


Erythrocyte Sedimentation Rate 57 (0-25)    


 


Glucose (Fingerstick)


 


 152 mg/dL


(70-99) 168 mg/dL


(70-99) 





 


Urine Collection Type    Unknown 


 


Urine Color (Auto)    Light yellow 


 


Urine Turbidity    Clear 


 


Urine pH (Auto)    6.0 (<5.0-8.0) 


 


Urine Specific Gravity


 


 


 


 1.014


(1.000-1.030)


 


Urine Protein (Auto)


 


 


 


 200 mg/dL


(Negative)


 


Urine Glucose (Auto)(UA)


 


 


 


 Negative mg/dL


(Negative)


 


Urine Ketones (Auto)


 


 


 


 Negative mg/dL


(Negative)


 


Urine Blood (Auto)


 


 


 


 Negative


(Negative)


 


Urine Nitrite (Auto)


 


 


 


 Negative


(Negative)


 


Urine Bilirubin (Auto)


 


 


 


 Negative


(Negative)


 


Urine Urobilinogen (Auto)


 


 


 


 Normal mg/dL


(Normal)


 


Urine Leukocyte Esterase


(Auto) 


 


 


 Negative


(Negative)


 


Urine RBC    1-2 /HPF (0-2) 


 


Urine WBC    1-4 /HPF (0-4) 


 


Urine Squamous Epithelial


Cells 


 


 


 Mod /LPF 





 


Urine Bacteria


 


 


 


 Few /HPF


(0-FEW)


 


Urine Random Creatinine


 


 


 


 163.8 mg/dL


(Not Establ.)


 


Urine Random Total Protein


 


 


 


 190.7 mg/dL


(Not Establ.)


 


Urine Protein/Creatinine Ratio


 


 


 


 1164 mg/g


(0-200)


 


Test


 5/8/22


05:30 5/8/22


07:41 5/8/22


11:21 





 


Sodium Level


 136 mmol/L


(136-145) 


 


 





 


Potassium Level


 5.0 mmol/L


(3.5-5.1) 


 


 





 


Chloride Level


 103 mmol/L


() 


 


 





 


Carbon Dioxide Level


 27 mmol/L


(21-32) 


 


 





 


Anion Gap 6 (6-14)    


 


Blood Urea Nitrogen


 23 mg/dL


(7-20) 


 


 





 


Creatinine


 2.3 mg/dL


(0.6-1.0) 


 


 





 


Estimated GFR


(Cockcroft-Gault) 23.4 


 


 


 





 


Glucose Level


 196 mg/dL


(70-99) 


 


 





 


Calcium Level


 8.4 mg/dL


(8.5-10.1) 


 


 





 


Glucose (Fingerstick)


 


 200 mg/dL


(70-99) 204 mg/dL


(70-99) 











Results


All relevant outside records, renal labs, imaging studies, telemetry/EKG's were 

reviewed.





Justicifation of Admission Dx:


Justifications for Admission:


Justification of Admission Dx:  Yes


Stroke - Ischemic:  Stroke-Ischemic











JIGAR CISSE MD                May 8, 2022 12:10

## 2022-05-08 NOTE — PN
DATE: 05/07/2022

SUBJECTIVE:  The patient is sitting in her recliner in no apparent respiratory 

distress.  On questioning her, she denied any complaint.  Nursing staff stated 

that she is mostly bedbound and chair bound.  She does use a walker to the 

bathroom and her friend actually cleans her and assisted her in having a bath.  

She has not really been evaluated thoroughly by the physical therapist yet to 

see what she can and she cannot do for me to be able to talk to the insurance 

company.



PHYSICAL EXAMINATION:

GENERAL:  When I examined her this morning, she looked well and was clearly in 

no apparent respiratory distress, pale, but not jaundiced, cyanosed, no 

lymphadenopathy, no thyromegaly, no jugular venous distention.  No lower limb 

edema.

VITAL SIGNS:  Her heart rate was 99, blood pressure was 118/60, temperature 98, 

respiratory rate was 17 and oxygen saturation was 97%.

HEAD, EYES, EARS, NOSE, AND THROAT:  Normocephalic, atraumatic.

NECK:  Supple.

HEART:  Normal first and second heart sounds.  No gallop, rub or murmur.

CHEST:  Clear to auscultation, no crepitation or rhonchi.

ABDOMEN:  Distended, soft, nontender.

NEUROLOGIC:  She has slight left cheek sensory loss and slight left central 

facial weakness, otherwise no focal findings.  Her muscle strength and deep 

tendon reflexes are normal and plantar reflexes are flexors.



Her intake was 200, output was 2100.



LABORATORY DATA:  Her lab work this morning showed a serum sodium 139, potassium

5, chloride 104, bicarbonate 28, anion gap of 7, BUN 23, creatinine 2.3.  

Estimated GFR was 23 mL per minute.  Her glucose was 153 and calcium was 8.6.



ASSESSMENT AND PLAN:

1.  Hypertensive encephalopathy, for which she was started on Cardene drip that 

was discontinued.  She is now on Procardia 120 mg once a day, hydralazine 100 mg

3 times a day, clonidine TTS patch topically once a week as well as labetalol 

100 mg twice a day and her blood pressure this morning was 118/70.

2.  The patient did complain of tingling and numbness of her left side of the 

face and an MRI showed that she has a focal area of acute to subacute ischemia 

involving the left lateral liset and extending into the left brachium pontis.  

She has also an associated cytotoxic edema without significant mass effect or 

hemorrhage.  The patient has mild facial weakness and sensory deficit, but no 

other focal deficits.

3.  Acute on chronic kidney injury, improving.  In fact, her serum creatinine 

has plateaued around 2.

4.  Type 2 diabetes mellitus that seems to be reasonably controlled.

5.  Bronchial asthma, clinically quiescent.

6.  Morbid obesity and possible obstructive sleep apnea.

7.  The patient has irregular menstrual cycles for which she was on oral 

contraceptive pills that was held.

8. Debility and weakness for which she continued to require physical and 

occupational therapy and hopefully now that her blood pressure is much better 

controlled that she can be evaluated thoroughly to see whether she needs a rehab

hospital and/or a skilled nursing facility.







AMM/KRI/MONI

DR: AMM/nts   DD: 05/07/2022 09:06

DT: 05/07/2022 16:52   TID: [id number]

## 2022-05-09 VITALS — SYSTOLIC BLOOD PRESSURE: 133 MMHG | DIASTOLIC BLOOD PRESSURE: 64 MMHG

## 2022-05-09 VITALS — DIASTOLIC BLOOD PRESSURE: 66 MMHG | SYSTOLIC BLOOD PRESSURE: 159 MMHG

## 2022-05-09 VITALS — DIASTOLIC BLOOD PRESSURE: 77 MMHG | SYSTOLIC BLOOD PRESSURE: 163 MMHG

## 2022-05-09 VITALS — DIASTOLIC BLOOD PRESSURE: 76 MMHG | SYSTOLIC BLOOD PRESSURE: 151 MMHG

## 2022-05-09 VITALS — SYSTOLIC BLOOD PRESSURE: 196 MMHG | DIASTOLIC BLOOD PRESSURE: 77 MMHG

## 2022-05-09 VITALS — DIASTOLIC BLOOD PRESSURE: 79 MMHG | SYSTOLIC BLOOD PRESSURE: 158 MMHG

## 2022-05-09 LAB
ANION GAP SERPL CALC-SCNC: 9 MMOL/L (ref 6–14)
BUN SERPL-MCNC: 28 MG/DL (ref 7–20)
CALCIUM SERPL-MCNC: 8.7 MG/DL (ref 8.5–10.1)
CHLORIDE SERPL-SCNC: 104 MMOL/L (ref 98–107)
CO2 SERPL-SCNC: 25 MMOL/L (ref 21–32)
CREAT SERPL-MCNC: 2.6 MG/DL (ref 0.6–1)
GFR SERPLBLD BASED ON 1.73 SQ M-ARVRAT: 20.3 ML/MIN
GLUCOSE SERPL-MCNC: 154 MG/DL (ref 70–99)
POTASSIUM SERPL-SCNC: 5 MMOL/L (ref 3.5–5.1)
SODIUM SERPL-SCNC: 138 MMOL/L (ref 136–145)

## 2022-05-09 RX ADMIN — ACETAMINOPHEN PRN MG: 325 TABLET, FILM COATED ORAL at 13:48

## 2022-05-09 RX ADMIN — ASPIRIN SCH MG: 325 TABLET, DELAYED RELEASE ORAL at 09:21

## 2022-05-09 RX ADMIN — ONDANSETRON PRN MG: 4 TABLET, ORALLY DISINTEGRATING ORAL at 20:36

## 2022-05-09 RX ADMIN — INSULIN LISPRO SCH UNITS: 100 INJECTION, SOLUTION INTRAVENOUS; SUBCUTANEOUS at 11:39

## 2022-05-09 RX ADMIN — INSULIN GLARGINE SCH UNIT: 100 INJECTION, SOLUTION SUBCUTANEOUS at 20:40

## 2022-05-09 RX ADMIN — ATORVASTATIN CALCIUM SCH MG: 40 TABLET, FILM COATED ORAL at 20:36

## 2022-05-09 RX ADMIN — INSULIN LISPRO SCH UNITS: 100 INJECTION, SOLUTION INTRAVENOUS; SUBCUTANEOUS at 17:46

## 2022-05-09 RX ADMIN — INSULIN LISPRO SCH UNITS: 100 INJECTION, SOLUTION INTRAVENOUS; SUBCUTANEOUS at 10:05

## 2022-05-09 RX ADMIN — LABETALOL HCL SCH MG: 100 TABLET, FILM COATED ORAL at 20:36

## 2022-05-09 RX ADMIN — MONTELUKAST SODIUM SCH MG: 10 TABLET, FILM COATED ORAL at 09:21

## 2022-05-09 RX ADMIN — LABETALOL HCL SCH MG: 100 TABLET, FILM COATED ORAL at 09:21

## 2022-05-09 NOTE — NUR
SS following up with discharge planning. SS reviewed pt chart and discussed with pt RN. Pt 
is currently on room air. PT/OT recommended inpatient rehabilitation. Pt was accepted at [facility], [phone]; fax [phone]. Insurance requesting peer 
to peer at phone number [phone]. Dr. Chao notified was notified on 5/6/2022 and called 
insurance and left a voicemail. Dr. Chao awaiting return call from insurance at this time. 
[facility] reported that Dr. Chao will get a return call today for peer to peer. Pt also accepted 
at [facility], [phone]; fax [phone], pending insurance. Clinical updates 
sent to [facility] and [facility]. SS will continue to follow for discharge planning.

## 2022-05-09 NOTE — PDOC
DATE OF SERVICE


DATE: 5/9/22 


TIME: 09:19





SUBJECTIVE


ROS


Denies SOB  ,  No N/V. States feeling tired and weak "Im not ready to be dced , 

I cant even stand up"





OBJECTIVE


Vital Signs





Vital Signs








  Date Time  Temp Pulse Resp B/P (MAP) Pulse Ox O2 Delivery O2 Flow Rate FiO2


 


5/9/22 07:00 98.4 91 19 151/76 (101) 93 Room Air  





 98.4       








I & 0











Intake and Output 


 


 5/9/22





 07:00


 


Intake Total 960 ml


 


Output Total 1250 ml


 


Balance -290 ml


 


 


 


Intake Oral 960 ml


 


Output Urine Total 1250 ml











PHYSICAL EXAM


Physical Exam


GENERAL:  Morbidly Obese ,  


HEEN  Normocephalic, atraumatic 


NECK:  Supple.


HEART:   normal first and second heart sounds.  No gallop, rub or murmur.


LUNGS :  Clear to auscultation, decreased at bases, non labored  


ABDOMEN:  Distended, soft, nontender.


NEUROLOGIC:  Grossly Normal, Moves all 4 extremities .  She did complain of 

numbness in her left side of the face without any obvious motor deficit.


PSYCH COOPERATIVE 


EXT  LE  edema  + 


  Malhotra + , No CVA or SP tenderness  


DERM No Rash








DIAGNOSIS/ASSESSMENT


Assessment & Plan


MICHELLE -atn   / Vomiting  /Poor po intake/ IV Contrast / Hx of NSAID use   . 

Baseline Cr normal in 2017 per [facility] records. No Interval labs available  .UA 

unremarkable, US Unremarkable, 


Dialysis on 4/28  ,2 nd treatment  4/30   .  Creatinine trended up, asymptomatic

at rest  , No emergent indication for dialysis    . Supportive care    Strict 

I/O  , Daily standing weight     . DC Temp HDc prior to dc and follow up with 

PCP closely and with Nephrology  as OP  


If not dced today, Re-evaluate Renal function tomorrow  





CKD - per Primary's note, No interval labs, dont know her baseline  . Suspect 

CKD 2/2 DM and Uncontrolled HTN 





HTN Urgency   -   multiple antihypertensive medications at home . Renal Duplex 

Normal   . Can add Labetalol . Dw Dr Chao  .





Hypertensive encephalopathy, also has a small left lateral liset infarct 

extending to the left brachium pontis- NEUROLOGY FOLLOWING  . 





Type 2 Diabetes mellitus. she stopped Insulin on her own recently  





Bronchial asthma





COMMENT/RELEVANT DATA


Meds





Current Medications








 Medications


  (Trade)  Dose


 Ordered  Sig/Kavitha  Start Time


 Stop Time Status Last Admin


Dose Admin


 


 Acetaminophen


  (Tylenol)  650 mg  PRN Q6HRS  PRN  4/25/22 16:45


    5/7/22 19:20


650 MG


 


 Aspirin


  (Aspirin Rectal


 Supp)  300 mg  PRN DAILY  PRN  4/25/22 16:45


     





 


 Aspirin


  (Ecotrin)  325 mg  DAILYWBKFT  4/25/22 17:00


    5/8/22 08:25


325 MG


 


 Atorvastatin


 Calcium


  (Lipitor)  80 mg  QHS  4/25/22 21:00


    5/8/22 21:28


80 MG


 


 Clonidine HCl


  (Catapres Tts-3)  1 patch  WEEKLY  5/4/22 15:30


    5/4/22 15:47


1 PATCH


 


 Enoxaparin Sodium


  (Lovenox 60mg


 Syringe)  60 mg  Q24H  4/26/22 17:00


 4/28/22 11:36 DC 4/26/22 17:41


60 MG


 


 Enoxaparin Sodium


  (Lovenox Per


 Pharmacy


 Prophylaxis


 Dosing)  1 each  PRN DAILY  PRN  4/26/22 16:00


 4/28/22 11:43 DC  





 


 Furosemide


  (Lasix)  40 mg  1X  ONCE  5/5/22 09:45


 5/5/22 09:46 DC 5/5/22 11:04


40 MG


 


 Heparin Sodium


  (Porcine)


  (Heparin Sodium)  5,000 unit  Q8HRS  4/28/22 14:00


 4/29/22 08:49 DC 4/29/22 06:27


5,000 UNIT


 


 Hydralazine HCl


  (Apresoline)  100 mg  TID  5/4/22 10:30


    5/8/22 21:28


100 MG


 


 Info


  (PHARMACY


 MONITORING -- do


 not chart)  1 each  PRN DAILY  PRN  4/30/22 10:45


   UNV  





 


 Insulin Glargine


  (Lantus Syringe)  40 unit  1X  ONCE  4/24/22 23:00


 4/24/22 23:01 DC 4/24/22 22:21


40 UNIT


 


 Insulin Human


 Lispro


  (HumaLOG)  20 units  TIDWMEALS  4/26/22 08:45


    5/8/22 17:03


20 UNITS


 


 Labetalol HCl


  (Trandate)  100 mg  BID  5/6/22 13:00


    5/8/22 21:28


100 MG


 


 Lidocaine HCl


  (Buffered


 Lidocaine 1%)  6 ml  1X  ONCE  4/27/22 12:15


 4/27/22 12:16 DC  





 


 Losartan Potassium


  (Cozaar)  100 mg  DAILY  4/25/22 09:00


 4/26/22 10:31 DC 4/26/22 09:28


100 MG


 


 Montelukast Sodium


  (Singulair)  10 mg  DAILY  4/25/22 09:00


    5/8/22 08:24


10 MG


 


 Nicardipine HCl


 50 mg/Sodium


 Chloride  250 ml @ 


 25 mls/hr  CONT PRN  PRN  4/25/22 16:45


 4/30/22 14:30 DC 4/29/22 20:30


37.5 MLS/HR


 


 Nifedipine


  (Procardia Xl)  120 mg  DAILY  4/29/22 09:15


    5/8/22 08:25


120 MG


 


 Ondansetron HCl


  (Zofran Odt)  4 mg  PRN Q6HRS  PRN  4/26/22 15:00


    5/8/22 14:32


4 MG


 


 Ondansetron HCl


  (Zofran)  4 mg  PRN Q4HRS  PRN  4/27/22 10:15


    5/1/22 15:23


4 MG


 


 Sodium Chloride  1,000 ml @ 


 400 mls/hr  Q2H30M PRN  4/30/22 10:45


 4/30/22 22:44 DC  





 


 Sodium Chloride


  (Ayr Saline


 Nasal)  1 kelly  PRN DAILY  PRN  4/27/22 10:15


    4/28/22 09:05


1 KELLY


 


 Spironolactone


  (Aldactone)  50 mg  DAILY  4/25/22 09:00


 4/28/22 09:48 DC 4/28/22 09:03


50 MG


 


 Zolpidem Tartrate


  (Ambien)  5 mg  PRN QHS  PRN  4/24/22 21:45


    5/4/22 20:47


5 MG








Lab





Laboratory Tests








Test


 5/8/22


11:21 5/8/22


16:37 5/8/22


20:42 5/9/22


04:55


 


Glucose (Fingerstick)


 204 mg/dL


(70-99) 162 mg/dL


(70-99) 155 mg/dL


(70-99) 





 


Sodium Level


 


 


 


 138 mmol/L


(136-145)


 


Potassium Level


 


 


 


 5.0 mmol/L


(3.5-5.1)


 


Chloride Level


 


 


 


 104 mmol/L


()


 


Carbon Dioxide Level


 


 


 


 25 mmol/L


(21-32)


 


Anion Gap    9 (6-14) 


 


Blood Urea Nitrogen


 


 


 


 28 mg/dL


(7-20)


 


Creatinine


 


 


 


 2.6 mg/dL


(0.6-1.0)


 


Estimated GFR


(Cockcroft-Gault) 


 


 


 20.3 





 


Glucose Level


 


 


 


 154 mg/dL


(70-99)


 


Calcium Level


 


 


 


 8.7 mg/dL


(8.5-10.1)


 


Test


 5/9/22


07:09 


 


 





 


Glucose (Fingerstick)


 167 mg/dL


(70-99) 


 


 











Results


All relevant outside records, renal labs, imaging studies, telemetry/EKG's were 

reviewed.





Justicifation of Admission Dx:


Justifications for Admission:


Justification of Admission Dx:  Yes


Stroke - Ischemic:  Stroke-Ischemic











RAMESH LITTLEJOHN MD                 May 9, 2022 09:19

## 2022-05-09 NOTE — PDOC
PROGRESS NOTES


Date of Service


DATE: 5/9/22 


TIME: 08:42


Assessment


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  Has left

facial numbness still, needs roller walker to ambulate


Hypertensive encephalopathy, blood pressure still running very high, could not 

participate in physical therapy yesterday


Acute-on-chronic kidney injury, had CT angiogram of chest at [facility].  

Started dialysis, now stopped


History of diabetes, hypertension, hyperlipidemia, noncompliant, asthma, sleep 

apnea


Echocardiogram noted, I do not think she needs a transesophageal echocardiogram,

the location of the stroke is unlikely cardiac-source


Plan


Aspirin


High-dose statin


Treatment of hypertension. Tightened parameters back to 150/90 starting 4/27


Rehabilitation modalities


Agree with transfer to [facility] rehab


Subjective


No complaints


Objective





Vital Signs








  Date Time  Temp Pulse Resp B/P (MAP) Pulse Ox O2 Delivery O2 Flow Rate FiO2


 


5/9/22 07:00 98.4 91 19 151/76 (101) 93 Room Air  





 98.4       














Intake and Output 


 


 5/9/22





 06:59


 


Intake Total 960 ml


 


Output Total 1250 ml


 


Balance -290 ml


 


 


 


Intake Oral 960 ml


 


Output Urine Total 1250 ml








PHYSICAL EXAM


Alert. Oriented to time, place and person.


PERRL.


EOMI.


CN: Slight left cheek sensory loss, slight left central facial weakness, 

otherwise no focal findings.


Muscle tone: normal.


Muscle strength: 4/5


DTR: 2+


Plantar reflex: Flexor


Gait: not examined in bed.


Sensory exam: no abnormal findings.


No cerebellar signs elicited.


Review of Relevant


I have reviewed the following items lana (where applicable) has been applied.


Labs





Laboratory Tests








Test


 5/7/22


11:08 5/7/22


14:10 5/7/22


16:12 5/7/22


20:29


 


Glucose (Fingerstick)


 180 mg/dL


(70-99) 


 152 mg/dL


(70-99) 168 mg/dL


(70-99)


 


Erythrocyte Sedimentation Rate  57 (0-25)   


 


Test


 5/7/22


22:45 5/8/22


05:30 5/8/22


07:41 5/8/22


11:21


 


Urine Collection Type Unknown    


 


Urine Color (Auto) Light yellow    


 


Urine Turbidity Clear    


 


Urine pH (Auto) 6.0 (<5.0-8.0)    


 


Urine Specific Gravity


 1.014


(1.000-1.030) 


 


 





 


Urine Protein (Auto)


 200 mg/dL


(Negative) 


 


 





 


Urine Glucose (Auto)(UA)


 Negative mg/dL


(Negative) 


 


 





 


Urine Ketones (Auto)


 Negative mg/dL


(Negative) 


 


 





 


Urine Blood (Auto)


 Negative


(Negative) 


 


 





 


Urine Nitrite (Auto)


 Negative


(Negative) 


 


 





 


Urine Bilirubin (Auto)


 Negative


(Negative) 


 


 





 


Urine Urobilinogen (Auto)


 Normal mg/dL


(Normal) 


 


 





 


Urine Leukocyte Esterase


(Auto) Negative


(Negative) 


 


 





 


Urine RBC 1-2 /HPF (0-2)    


 


Urine WBC 1-4 /HPF (0-4)    


 


Urine Squamous Epithelial


Cells Mod /LPF 


 


 


 





 


Urine Bacteria


 Few /HPF


(0-FEW) 


 


 





 


Urine Random Creatinine


 163.8 mg/dL


(Not Establ.) 


 


 





 


Urine Random Total Protein


 190.7 mg/dL


(Not Establ.) 


 


 





 


Urine Protein/Creatinine Ratio


 1164 mg/g


(0-200) 


 


 





 


Sodium Level


 


 136 mmol/L


(136-145) 


 





 


Potassium Level


 


 5.0 mmol/L


(3.5-5.1) 


 





 


Chloride Level


 


 103 mmol/L


() 


 





 


Carbon Dioxide Level


 


 27 mmol/L


(21-32) 


 





 


Anion Gap  6 (6-14)   


 


Blood Urea Nitrogen


 


 23 mg/dL


(7-20) 


 





 


Creatinine


 


 2.3 mg/dL


(0.6-1.0) 


 





 


Estimated GFR


(Cockcroft-Gault) 


 23.4 


 


 





 


Glucose Level


 


 196 mg/dL


(70-99) 


 





 


Calcium Level


 


 8.4 mg/dL


(8.5-10.1) 


 





 


Glucose (Fingerstick)


 


 


 200 mg/dL


(70-99) 204 mg/dL


(70-99)


 


Test


 5/8/22


16:37 5/8/22


20:42 5/9/22


04:55 5/9/22


07:09


 


Glucose (Fingerstick)


 162 mg/dL


(70-99) 155 mg/dL


(70-99) 


 167 mg/dL


(70-99)


 


Sodium Level


 


 


 138 mmol/L


(136-145) 





 


Potassium Level


 


 


 5.0 mmol/L


(3.5-5.1) 





 


Chloride Level


 


 


 104 mmol/L


() 





 


Carbon Dioxide Level


 


 


 25 mmol/L


(21-32) 





 


Anion Gap   9 (6-14)  


 


Blood Urea Nitrogen


 


 


 28 mg/dL


(7-20) 





 


Creatinine


 


 


 2.6 mg/dL


(0.6-1.0) 





 


Estimated GFR


(Cockcroft-Gault) 


 


 20.3 


 





 


Glucose Level


 


 


 154 mg/dL


(70-99) 





 


Calcium Level


 


 


 8.7 mg/dL


(8.5-10.1) 











Laboratory Tests








Test


 5/8/22


11:21 5/8/22


16:37 5/8/22


20:42 5/9/22


04:55


 


Glucose (Fingerstick)


 204 mg/dL


(70-99) 162 mg/dL


(70-99) 155 mg/dL


(70-99) 





 


Sodium Level


 


 


 


 138 mmol/L


(136-145)


 


Potassium Level


 


 


 


 5.0 mmol/L


(3.5-5.1)


 


Chloride Level


 


 


 


 104 mmol/L


()


 


Carbon Dioxide Level


 


 


 


 25 mmol/L


(21-32)


 


Anion Gap    9 (6-14) 


 


Blood Urea Nitrogen


 


 


 


 28 mg/dL


(7-20)


 


Creatinine


 


 


 


 2.6 mg/dL


(0.6-1.0)


 


Estimated GFR


(Cockcroft-Gault) 


 


 


 20.3 





 


Glucose Level


 


 


 


 154 mg/dL


(70-99)


 


Calcium Level


 


 


 


 8.7 mg/dL


(8.5-10.1)


 


Test


 5/9/22


07:09 


 


 





 


Glucose (Fingerstick)


 167 mg/dL


(70-99) 


 


 











Medications





Current Medications


Nicardipine HCl 50 mg/Sodium Chloride 250 ml @  25 mls/hr CONT  PRN IV PER 

PROTOCOL Last administered on 4/25/22at 15:53;  Start 4/24/22 at 21:45;  Stop 

4/25/22 at 16:43;  Status DC


Nifedipine (Procardia Xl) 30 mg DAILY PO  Last administered on 4/28/22at 09:04; 

Start 4/25/22 at 09:00;  Stop 4/28/22 at 09:48;  Status DC


Zolpidem Tartrate (Ambien) 5 mg PRN QHS  PRN PO INSOMNIA Last administered on 

5/4/22at 20:47;  Start 4/24/22 at 21:45


Montelukast Sodium (Singulair) 10 mg DAILY PO  Last administered on 5/8/22at 0

8:24;  Start 4/25/22 at 09:00


Insulin Human Lispro (HumaLOG) 15 units TIDWMEALS SQ  Last administered on 

4/25/22at 18:30;  Start 4/25/22 at 08:00;  Stop 4/26/22 at 08:47;  Status DC


Insulin Glargine (Lantus Syringe) 40 unit QHS SQ  Last administered on 5/8/22at 

21:33;  Start 4/25/22 at 21:00


Losartan Potassium (Cozaar) 100 mg DAILY PO  Last administered on 4/26/22at 

09:28;  Start 4/25/22 at 09:00;  Stop 4/26/22 at 10:31;  Status DC


Spironolactone (Aldactone) 50 mg DAILY PO  Last administered on 4/28/22at 09:03;

 Start 4/25/22 at 09:00;  Stop 4/28/22 at 09:48;  Status DC


Insulin Glargine (Lantus Syringe) 40 unit 1X  ONCE SQ  Last administered on 

4/24/22at 22:21;  Start 4/24/22 at 23:00;  Stop 4/24/22 at 23:01;  Status DC


Sodium Chloride 1,000 ml @  25 mls/hr Q24H IV  Last administered on 4/28/22at 

09:55;  Start 4/25/22 at 15:15;  Stop 4/30/22 at 14:30;  Status DC


Nicardipine HCl 50 mg/Sodium Chloride 250 ml @  25 mls/hr CONT PRN  PRN IV PER 

PROTOCOL Last administered on 4/29/22at 20:30;  Start 4/25/22 at 16:45;  Stop 

4/30/22 at 14:30;  Status DC


Atorvastatin Calcium (Lipitor) 80 mg QHS PO  Last administered on 5/8/22at 

21:28;  Start 4/25/22 at 21:00


Acetaminophen (Tylenol) 650 mg PRN Q6HRS  PRN PO MILD PAIN / TEMP > 100.3'F Last

administered on 5/7/22at 19:20;  Start 4/25/22 at 16:45


Aspirin (Ecotrin) 325 mg DAILYWBKFT PO  Last administered on 5/8/22at 08:25;  

Start 4/25/22 at 17:00


Aspirin (Aspirin Rectal Supp) 300 mg PRN DAILY  PRN PA IF UNABLE TO TAKE PO;  

Start 4/25/22 at 16:45


Insulin Human Lispro (HumaLOG) 20 units TIDWMEALS SQ  Last administered on 

5/8/22at 17:03;  Start 4/26/22 at 08:45


Ondansetron HCl (Zofran Odt) 4 mg PRN Q6HRS  PRN PO NAUSEA/VOMITING Last 

administered on 5/8/22at 14:32;  Start 4/26/22 at 15:00


Enoxaparin Sodium (Lovenox Per Pharmacy Prophylaxis Dosing) 1 each PRN DAILY  

PRN MC SEE COMMENTS;  Start 4/26/22 at 16:00;  Stop 4/28/22 at 11:43;  Status DC


Enoxaparin Sodium (Lovenox 60mg Syringe) 60 mg Q24H SQ  Last administered on 

4/26/22at 17:41;  Start 4/26/22 at 17:00;  Stop 4/28/22 at 11:36;  Status DC


Ondansetron HCl (Zofran) 4 mg PRN Q4HRS  PRN IVP NAUSEA/VOMITING Last 

administered on 5/1/22at 15:23;  Start 4/27/22 at 10:15


Sodium Chloride (Ayr Saline Nasal) 1 kelly PRN DAILY  PRN NS NASAL CONGESTION Last

administered on 4/28/22at 09:05;  Start 4/27/22 at 10:15


Lidocaine HCl (Buffered Lidocaine 1%) 3 ml STK-MED ONCE .ROUTE ;  Start 4/27/22 

at 11:30;  Stop 4/27/22 at 11:30;  Status DC


Lidocaine HCl (Buffered Lidocaine 1%) 6 ml 1X  ONCE INJ ;  Start 4/27/22 at 

12:15;  Stop 4/27/22 at 12:16;  Status DC


Sodium Chloride 1,000 ml @  1,000 mls/hr Q1H PRN IV hypotension;  Start 4/28/22 

at 08:30;  Stop 4/28/22 at 14:29;  Status DC


Sodium Chloride 1,000 ml @  400 mls/hr Q2H30M PRN IV PATENCY;  Start 4/28/22 at 

08:30;  Stop 4/28/22 at 20:29;  Status DC


Info (PHARMACY MONITORING -- do not chart) 1 each PRN DAILY  PRN MC SEE 

COMMENTS;  Start 4/28/22 at 08:30


Info (PHARMACY MONITORING -- do not chart) 1 each PRN DAILY  PRN MC SEE 

COMMENTS;  Start 4/28/22 at 08:30;  Stop 4/28/22 at 08:27;  Status DC


Nifedipine (Procardia Xl) 60 mg DAILY PO  Last administered on 4/28/22at 15:53; 

Start 4/28/22 at 10:00;  Stop 4/29/22 at 07:15;  Status DC


Heparin Sodium (Porcine) (Heparin Sodium) 5,000 unit Q8HRS SQ  Last administered

on 4/29/22at 06:27;  Start 4/28/22 at 14:00;  Stop 4/29/22 at 08:49;  Status DC


Nifedipine (Procardia Xl) 90 mg DAILY PO  Last administered on 4/29/22at 08:26; 

Start 4/29/22 at 09:00;  Stop 4/29/22 at 09:12;  Status DC


Nifedipine (Procardia Xl) 120 mg DAILY PO  Last administered on 5/8/22at 08:25; 

Start 4/29/22 at 09:15


Sodium Chloride 1,000 ml @  1,000 mls/hr Q1H PRN IV hypotension;  Start 4/30/22 

at 10:45;  Stop 4/30/22 at 16:44;  Status DC


Sodium Chloride 1,000 ml @  400 mls/hr Q2H30M PRN IV PATENCY;  Start 4/30/22 at 

10:45;  Stop 4/30/22 at 22:44;  Status DC


Info (PHARMACY MONITORING -- do not chart) 1 each PRN DAILY  PRN MC SEE 

COMMENTS;  Start 4/30/22 at 10:45;  Status UNV


Hydralazine HCl (Apresoline) 25 mg TID PO  Last administered on 5/3/22at 07:57; 

Start 5/2/22 at 10:30;  Stop 5/3/22 at 08:51;  Status DC


Hydralazine HCl (Apresoline) 50 mg TID PO  Last administered on 5/4/22at 08:34; 

Start 5/3/22 at 09:00;  Stop 5/4/22 at 10:20;  Status DC


Furosemide (Lasix) 40 mg 1X  ONCE IVP  Last administered on 5/3/22at 14:42;  

Start 5/3/22 at 14:45;  Stop 5/3/22 at 14:46;  Status DC


Hydralazine HCl (Apresoline) 100 mg TID PO  Last administered on 5/8/22at 21:28;

 Start 5/4/22 at 10:30


Clonidine HCl (Catapres Tts-3) 1 patch WEEKLY TD  Last administered on 5/4/22at 

15:47;  Start 5/4/22 at 15:30


Furosemide (Lasix) 40 mg 1X  ONCE IVP  Last administered on 5/5/22at 11:04;  

Start 5/5/22 at 09:45;  Stop 5/5/22 at 09:46;  Status DC


Labetalol HCl (Trandate) 100 mg BID PO  Last administered on 5/8/22at 21:28;  

Start 5/6/22 at 13:00





Active Scripts


Active


Levemir Flextouch (Insulin Detemir) 100 Unit/1 Ml Insuln.pen 40 Units SQ QHS 30 

Days


Novolog Flexpen (Insulin Aspart) 100 Unit/1 Ml Insuln.pen 15 Units SQ TIDAC 30 

Days


Reported


Tri-Sprintec (Norgestimate-Ethinyl Estradiol) 1 Each Tablet 1 Tab PO DAILY


Spironolactone 50 Mg Tablet 1 Tab PO DAILY


Montelukast Sodium Tablet  ** (Montelukast Sodium) 10 Mg Tablet 10 Mg PO DAILY


Losartan Potassium 100 Mg Tablet 100 Mg PO DAILY


Vitals/I & O





Vital Sign - Last 24 Hours








 5/8/22 5/8/22 5/8/22 5/8/22





 09:03 10:47 14:22 14:54


 


Temp  98.1  97.4





  98.1  97.4


 


Pulse 94 90 88 94


 


Resp  18  17


 


B/P (MAP) 131/72 129/68 (88) 143/76 143/76 (98)


 


Pulse Ox  95  95


 


O2 Delivery  Room Air  Room Air


 


    





    





 5/8/22 5/8/22 5/8/22 5/8/22





 19:16 19:30 21:28 21:28


 


Temp 98.2   





 98.2   


 


Pulse 94  94 94


 


Resp 16   


 


B/P (MAP) 146/70 (95)  146/70 146/70


 


Pulse Ox 96   


 


O2 Delivery Room Air Room Air  


 


    





    





 5/8/22 5/9/22 5/9/22 





 22:41 02:19 07:00 


 


Temp 99.1 99.6 98.4 





 99.1 99.6 98.4 


 


Pulse 91 91 91 


 


Resp 16 16 19 


 


B/P (MAP) 168/78 (108) 159/66 (97) 151/76 (101) 


 


Pulse Ox 98 99 93 


 


O2 Delivery Room Air Room Air Room Air 














Intake and Output   


 


 5/8/22 5/8/22 5/9/22





 14:59 22:59 06:59


 


Intake Total 600 ml 240 ml 120 ml


 


Output Total  500 ml 750 ml


 


Balance 600 ml -260 ml -630 ml











Justicifation of Admission Dx:


Justifications for Admission:


Justification of Admission Dx:  Yes


Stroke - Ischemic:  Stroke-Ischemic











ANGÉLICA MOYER MD            May 9, 2022 08:44

## 2022-05-09 NOTE — PN
DATE: 05/09/2022

SUBJECTIVE:  The patient is resting, sitting comfortably in her recliner in no 

apparent respiratory distress.  She continued to complain of being weak; 

however, she denied any nausea or vomiting.  Her blood pressure seems to be 

reasonably controlled.  However, her creatinine has risen to 2.6 this morning. 

Her blood sugar continued to be reasonably controlled.



PHYSICAL EXAMINATION:

GENERAL:  When I examined her, she was pale, not jaundiced or cyanosed, no 

lymphadenopathy, no thyromegaly, no jugular venous distention.  No lower limb 

edema.

VITAL SIGNS:  Her heart rate was 91, blood pressure was 151/76, temperature was 

98.4, respiratory rate was 19, and oxygen saturation was 93% on room air.

HEAD, EYES, EARS, NOSE AND THROAT:  Normocephalic, atraumatic.

NECK:  Supple.

HEART:  Showed normal first and second heart sounds.  No gallop, rub or murmur.

CHEST:  Clear to auscultation, no crepitation or rhonchi.

ABDOMEN:  Distended, soft, nontender.

NEUROLOGIC:  She was awake, alert, responding appropriately.  Cranial nerves are

intact except mild left-sided facial weakness.  She is able to move her 

extremities without difficulty. She can ambulate with a walker with standby 

assist.



Her intake was 1150, output was 800.



LABORATORY DATA:  As of this morning, her serum sodium was 138, potassium 5, 

chloride 104, bicarbonate 25, anion gap of 9, BUN 28, creatinine 2.6, estimated 

GFR was 20 mL per minute.  Her glucose 154, calcium was 8.7.  Her most recent 

white cell count was 10.8, hemoglobin 10, hematocrit 31, MCV 87 and platelet 

count 366,000.



ASSESSMENT:

1.  Hypertensive encephalopathy, for which she was started on Cardene drip that 

was discontinued.  She is now on Procardia 120 mg once a day, hydralazine 100 mg

3 times a day, clonidine TTS patch topically once a week as well as labetalol 

100 mg twice a day.  Her blood pressure has been reasonably controlled.

2.  The patient did complain of tingling and numbness of her left side of the 

face and an MRI showed that she has a focal area of acute to subacute ischemia 

involving the left lateral liset and extending into the left brachium pontis.  

She also has an associated cytotoxic edema without significant mass effect or 

hemorrhage.  She does have mild weakness of her face and mild sensory deficit, 

but no other focal deficits.

3.  Acute on chronic kidney injury.  Unfortunately, her creatinine is climbing 

up today is 2.6, although there is no indication for hemodialysis.

4.  Type 2 diabetes mellitus, seems to be reasonably controlled.

5.  Bronchial asthma, clinically quiescent.

6.  Morbid obesity and possible obstructive sleep apnea.

7.  The patient has irregular menstrual cycle, for which she was on oral 

contraceptives.  It was put on hold.

8.  Debility and weakness, for which she continued to require physical and 

occupational therapy.



PLAN:

1.  Continue to monitor her kidney function and urine output.

2.  Continue with antihypertensive medication.

3.  Continue to monitor blood sugar and adjust insulin as needed.

4.  Continue with physical and occupational therapy.

5.  The patient will be discharged to [facility] if accepted.  Otherwise, 

she can go to a skilled nursing facility.







AMM/BRENDAN

DR: AMM/nts   DD: 05/09/2022 10:16

DT: 05/09/2022 11:52   TID: [id number]

## 2022-05-10 VITALS — DIASTOLIC BLOOD PRESSURE: 72 MMHG | SYSTOLIC BLOOD PRESSURE: 152 MMHG

## 2022-05-10 VITALS — SYSTOLIC BLOOD PRESSURE: 159 MMHG | DIASTOLIC BLOOD PRESSURE: 87 MMHG

## 2022-05-10 VITALS — DIASTOLIC BLOOD PRESSURE: 73 MMHG | SYSTOLIC BLOOD PRESSURE: 147 MMHG

## 2022-05-10 VITALS — DIASTOLIC BLOOD PRESSURE: 68 MMHG | SYSTOLIC BLOOD PRESSURE: 136 MMHG

## 2022-05-10 VITALS — DIASTOLIC BLOOD PRESSURE: 75 MMHG | SYSTOLIC BLOOD PRESSURE: 141 MMHG

## 2022-05-10 VITALS — DIASTOLIC BLOOD PRESSURE: 60 MMHG | SYSTOLIC BLOOD PRESSURE: 123 MMHG

## 2022-05-10 LAB
ANION GAP SERPL CALC-SCNC: 10 MMOL/L (ref 6–14)
BUN SERPL-MCNC: 29 MG/DL (ref 7–20)
CALCIUM SERPL-MCNC: 8.7 MG/DL (ref 8.5–10.1)
CHLORIDE SERPL-SCNC: 104 MMOL/L (ref 98–107)
CO2 SERPL-SCNC: 25 MMOL/L (ref 21–32)
CREAT SERPL-MCNC: 2.3 MG/DL (ref 0.6–1)
GFR SERPLBLD BASED ON 1.73 SQ M-ARVRAT: 23.4 ML/MIN
GLUCOSE SERPL-MCNC: 177 MG/DL (ref 70–99)
POTASSIUM SERPL-SCNC: 4.6 MMOL/L (ref 3.5–5.1)
SODIUM SERPL-SCNC: 139 MMOL/L (ref 136–145)

## 2022-05-10 RX ADMIN — INSULIN LISPRO SCH UNITS: 100 INJECTION, SOLUTION INTRAVENOUS; SUBCUTANEOUS at 11:59

## 2022-05-10 RX ADMIN — ATORVASTATIN CALCIUM SCH MG: 40 TABLET, FILM COATED ORAL at 20:51

## 2022-05-10 RX ADMIN — ZOLPIDEM TARTRATE PRN MG: 5 TABLET ORAL at 20:58

## 2022-05-10 RX ADMIN — INSULIN LISPRO SCH UNITS: 100 INJECTION, SOLUTION INTRAVENOUS; SUBCUTANEOUS at 08:13

## 2022-05-10 RX ADMIN — INSULIN GLARGINE SCH UNIT: 100 INJECTION, SOLUTION SUBCUTANEOUS at 20:51

## 2022-05-10 RX ADMIN — LABETALOL HCL SCH MG: 100 TABLET, FILM COATED ORAL at 08:12

## 2022-05-10 RX ADMIN — INSULIN LISPRO SCH UNITS: 100 INJECTION, SOLUTION INTRAVENOUS; SUBCUTANEOUS at 17:12

## 2022-05-10 RX ADMIN — LABETALOL HCL SCH MG: 100 TABLET, FILM COATED ORAL at 20:50

## 2022-05-10 RX ADMIN — ASPIRIN SCH MG: 325 TABLET, DELAYED RELEASE ORAL at 08:11

## 2022-05-10 RX ADMIN — MONTELUKAST SODIUM SCH MG: 10 TABLET, FILM COATED ORAL at 08:11

## 2022-05-10 NOTE — NUR
SS following up with discharge planning. SS reviewed pt chart and discussed with pt RN. Pt 
is currently on room air. PT/OT recommended inpatient rehabilitation. [employer] denied [facility] in peer to peer with Dr. Chao. Pt accepted at [facility], [phone]; fax [phone], pending insurance approval. COVID19 PCR pending for 
placement. SS will continue to follow for discharge planning.

## 2022-05-10 NOTE — PDOC
DATE OF SERVICE


DATE: 5/10/22 


TIME: 09:36





SUBJECTIVE


ROS


Denies SOB  ,  No N/V. C/O feeling tired and weak





OBJECTIVE


Vital Signs





Vital Signs








  Date Time  Temp Pulse Resp B/P (MAP) Pulse Ox O2 Delivery O2 Flow Rate FiO2


 


5/10/22 08:12  85  152/72    


 


5/10/22 07:00 98.0  17  96 Room Air  





 98.0       








I & 0











Intake and Output 


 


 5/10/22





 07:00


 


Intake Total 540 ml


 


Output Total 700 ml


 


Balance -160 ml


 


 


 


Intake Oral 540 ml


 


Output Urine Total 700 ml











PHYSICAL EXAM


Physical Exam


GENERAL:  Morbidly Obese ,  


HEEN  Normocephalic, atraumatic 


NECK:  Supple.


HEART:   normal first and second heart sounds.  No gallop, rub or murmur.


LUNGS :  Clear to auscultation, decreased at bases, non labored  


ABDOMEN:  Distended, soft, nontender.


NEUROLOGIC:  Grossly Normal, Moves all 4 extremities .  She did complain of n

umbness in her left side of the face without any obvious motor deficit.


PSYCH COOPERATIVE 


EXT  LE  edema  + 


  Malhotra + , No CVA or SP tenderness  


DERM No Rash








DIAGNOSIS/ASSESSMENT


Assessment & Plan





MICHELLE -atn   / Vomiting  /Poor po intake/ IV Contrast / Hx of NSAID use   . 

Baseline Cr normal in 2017 per [facility] records. No Interval labs available  .UA 

unremarkable, US Unremarkable, 


Dialysis on 4/28  ,2 nd treatment  4/30   .  Creatinine stable, asymptomatic at 

rest  , No emergent indication for dialysis    . Supportive care    Strict I/O  

, Daily standing weight , If Poor PO intake  consider IVF    . DC Temp HDc prior

to dc (Currently used by nursing as central line) 


 follow up with PCP closely and with Nephrology  as OP  





CKD - per Primary's note, No interval labs, dont know her baseline  . Suspect 

CKD 2/2 DM and Uncontrolled HTN 





HTN Urgency   -   multiple antihypertensive medications at home . Renal Duplex 

Normal   . Can add Labetalol . Dw Dr Chao  .





Hypertensive encephalopathy, also has a small left lateral liset infarct 

extending to the left brachium pontis- NEUROLOGY FOLLOWING  . 





Type 2 Diabetes mellitus. she stopped Insulin on her own recently  





Bronchial asthma





COMMENT/RELEVANT DATA


Meds





Current Medications








 Medications


  (Trade)  Dose


 Ordered  Sig/Kavitha  Start Time


 Stop Time Status Last Admin


Dose Admin


 


 Acetaminophen


  (Tylenol)  650 mg  PRN Q6HRS  PRN  4/25/22 16:45


    5/9/22 13:48


650 MG


 


 Aspirin


  (Aspirin Rectal


 Supp)  300 mg  PRN DAILY  PRN  4/25/22 16:45


     





 


 Aspirin


  (Ecotrin)  325 mg  DAILYWBKFT  4/25/22 17:00


    5/10/22 08:11


325 MG


 


 Atorvastatin


 Calcium


  (Lipitor)  80 mg  QHS  4/25/22 21:00


    5/9/22 20:36


80 MG


 


 Clonidine HCl


  (Catapres Tts-3)  1 patch  WEEKLY  5/4/22 15:30


    5/4/22 15:47


1 PATCH


 


 Enoxaparin Sodium


  (Lovenox 60mg


 Syringe)  60 mg  Q24H  4/26/22 17:00


 4/28/22 11:36 DC 4/26/22 17:41


60 MG


 


 Enoxaparin Sodium


  (Lovenox Per


 Pharmacy


 Prophylaxis


 Dosing)  1 each  PRN DAILY  PRN  4/26/22 16:00


 4/28/22 11:43 DC  





 


 Furosemide


  (Lasix)  40 mg  1X  ONCE  5/5/22 09:45


 5/5/22 09:46 DC 5/5/22 11:04


40 MG


 


 Heparin Sodium


  (Porcine)


  (Heparin Sodium)  5,000 unit  Q8HRS  4/28/22 14:00


 4/29/22 08:49 DC 4/29/22 06:27


5,000 UNIT


 


 Hydralazine HCl


  (Apresoline)  100 mg  TID  5/4/22 10:30


    5/10/22 08:11


100 MG


 


 Info


  (PHARMACY


 MONITORING -- do


 not chart)  1 each  PRN DAILY  PRN  4/30/22 10:45


   UNV  





 


 Insulin Glargine


  (Lantus Syringe)  40 unit  1X  ONCE  4/24/22 23:00


 4/24/22 23:01 DC 4/24/22 22:21


40 UNIT


 


 Insulin Human


 Lispro


  (HumaLOG)  20 units  TIDWMEALS  4/26/22 08:45


    5/10/22 08:13


20 UNITS


 


 Labetalol HCl


  (Trandate)  100 mg  BID  5/6/22 13:00


    5/10/22 08:12


100 MG


 


 Lidocaine HCl


  (Buffered


 Lidocaine 1%)  6 ml  1X  ONCE  4/27/22 12:15


 4/27/22 12:16 DC  





 


 Losartan Potassium


  (Cozaar)  100 mg  DAILY  4/25/22 09:00


 4/26/22 10:31 DC 4/26/22 09:28


100 MG


 


 Montelukast Sodium


  (Singulair)  10 mg  DAILY  4/25/22 09:00


    5/10/22 08:11


10 MG


 


 Nicardipine HCl


 50 mg/Sodium


 Chloride  250 ml @ 


 25 mls/hr  CONT PRN  PRN  4/25/22 16:45


 4/30/22 14:30 DC 4/29/22 20:30


37.5 MLS/HR


 


 Nifedipine


  (Procardia Xl)  120 mg  DAILY  4/29/22 09:15


    5/10/22 08:10


120 MG


 


 Ondansetron HCl


  (Zofran Odt)  4 mg  PRN Q6HRS  PRN  4/26/22 15:00


    5/9/22 20:36


4 MG


 


 Ondansetron HCl


  (Zofran)  4 mg  PRN Q4HRS  PRN  4/27/22 10:15


    5/1/22 15:23


4 MG


 


 Sodium Chloride  1,000 ml @ 


 400 mls/hr  Q2H30M PRN  4/30/22 10:45


 4/30/22 22:44 DC  





 


 Sodium Chloride


  ([place]yr Saline


 Nasal)  1 kelly  PRN DAILY  PRN  4/27/22 10:15


    4/28/22 09:05


1 KELLY


 


 Spironolactone


  (Aldactone)  50 mg  DAILY  4/25/22 09:00


 4/28/22 09:48 DC 4/28/22 09:03


50 MG


 


 Zolpidem Tartrate


  (Ambien)  5 mg  PRN QHS  PRN  4/24/22 21:45


    5/4/22 20:47


5 MG








Lab





Laboratory Tests








Test


 5/9/22


11:26 5/9/22


16:47 5/9/22


20:31 5/10/22


04:00


 


Glucose (Fingerstick)


 173 mg/dL


(70-99) 168 mg/dL


(70-99) 143 mg/dL


(70-99) 





 


Sodium Level


 


 


 


 139 mmol/L


(136-145)


 


Potassium Level


 


 


 


 4.6 mmol/L


(3.5-5.1)


 


Chloride Level


 


 


 


 104 mmol/L


()


 


Carbon Dioxide Level


 


 


 


 25 mmol/L


(21-32)


 


Anion Gap    10 (6-14) 


 


Blood Urea Nitrogen


 


 


 


 29 mg/dL


(7-20)


 


Creatinine


 


 


 


 2.3 mg/dL


(0.6-1.0)


 


Estimated GFR


(Cockcroft-Gault) 


 


 


 23.4 





 


Glucose Level


 


 


 


 177 mg/dL


(70-99)


 


Calcium Level


 


 


 


 8.7 mg/dL


(8.5-10.1)


 


Test


 5/10/22


07:13 


 


 





 


Glucose (Fingerstick)


 166 mg/dL


(70-99) 


 


 











Results


All relevant outside records, renal labs, imaging studies, telemetry/EKG's were 

reviewed.





Justicifation of Admission Dx:


Justifications for Admission:


Justification of Admission Dx:  Yes


Stroke - Ischemic:  Stroke-Ischemic











RAMESH LITTLEJOHN MD                May 10, 2022 09:37

## 2022-05-10 NOTE — PN
DATE: 05/10/2022

SUBJECTIVE:  The patient is resting, slightly propped up in bed, in no apparent 

respiratory distress.  She is awake, alert.  On questioning her, denied any 

complaint.  Nursing staff stated that she continued to show poor motivation.  

She is not willing even to brush her teeth or get out of the bed to chair.  I 

did speak with the [facility] doctor who was concerned that she may 

not be able to tolerate aggressive treatment at the rehab center; however, she 

was evaluated only twice by the physical therapist here and she will make a 

decision today afternoon after she received the physical therapist evaluation.



PHYSICAL EXAMINATION:

GENERAL:  When I examined her this morning, she looked well and was clearly in 

no apparent respiratory distress, somewhat pale, not jaundiced, cyanosed, no 

lymphadenopathy, no thyromegaly, no jugular venous distention.  No lower limb 

edema.

VITAL SIGNS:  Her heart rate was 85, blood pressure was 152/72, temperature was 

98, respiratory rate was 17 and oxygen saturation was 96% on room air.

HEAD, EYES, EARS, NOSE, AND THROAT:  Normocephalic, atraumatic.

NECK:  Supple.

HEART:  Showed normal first and second heart sounds.  No gallop or murmur.

CHEST:  Clear to auscultation, no crepitation or rhonchi.

ABDOMEN:  Distended, soft, nontender.

NEUROLOGIC:  She is grossly intact.  She does have mild left-sided facial 

weakness.



Her intake was 960, output was 1250.



LABORATORY DATA:  As of this morning, her serum sodium was 139, potassium 4.6, 

chloride 104, bicarbonate 25, anion gap of 10, BUN 29, creatinine 2.3.  

Estimated GFR was 23 mL per minute.  Her glucose 177 and calcium was 8.7.



ASSESSMENT:

1.  Hypertensive encephalopathy, for which she was started on Cardene drip that 

finally was discontinued.  She is now on Procardia 120 mg once a day, 

hydralazine 100 mg 3 times a day, clonidine TTS 0.3 patch topically once a week 

as well as labetalol 100 mg twice a day.  Her blood pressure has been well 

controlled.

2.  The patient did complain of tingling and numbness in her left side of the 

face and an MRI showed that she has a focal area of acute to subacute ischemia 

involving the left lateral liset and extending into the left brachium pontis.  

There is an associated cytotoxic edema without significant mass effect or 

hemorrhage.  She continued to have mild weakness in the left side of her face.

3.  Acute on chronic kidney injury.  Her serum creatinine has plateaued around 

2.3 mg/dL.

4.  The patient has type 2 diabetes mellitus, seems to be reasonably controlled.

5.  Bronchial asthma, clinically quiescent.

6.  Morbid obesity and obstructive sleep apnea.

7.  The patient has regular menstrual periods, which she was on oral 

contraceptive pills that was held.

8.  Debility and weakness for which she continues to require physical and 

occupational therapy.



PLAN:

1.  Continue monitoring kidney function and urine output.

2.  Continue with antihypertensive medication.

3.  Continue to monitor blood sugar and adjust insulin as needed.

4.  Continue with physical and occupational therapy.  The patient will be 

discharged either to skilled nursing facility if she continued to do poorly on 

her physical therapy or [facility] if she is doing much better.







AMM/PAR

DR: AMM/nts   DD: 05/10/2022 09:00

DT: 05/10/2022 09:23   TID: [id number]

## 2022-05-11 VITALS — SYSTOLIC BLOOD PRESSURE: 154 MMHG | DIASTOLIC BLOOD PRESSURE: 76 MMHG

## 2022-05-11 VITALS — DIASTOLIC BLOOD PRESSURE: 52 MMHG | SYSTOLIC BLOOD PRESSURE: 125 MMHG

## 2022-05-11 VITALS — SYSTOLIC BLOOD PRESSURE: 136 MMHG | DIASTOLIC BLOOD PRESSURE: 66 MMHG

## 2022-05-11 VITALS — SYSTOLIC BLOOD PRESSURE: 155 MMHG | DIASTOLIC BLOOD PRESSURE: 80 MMHG

## 2022-05-11 VITALS — SYSTOLIC BLOOD PRESSURE: 116 MMHG | DIASTOLIC BLOOD PRESSURE: 55 MMHG

## 2022-05-11 VITALS — SYSTOLIC BLOOD PRESSURE: 142 MMHG | DIASTOLIC BLOOD PRESSURE: 78 MMHG

## 2022-05-11 LAB
ANION GAP SERPL CALC-SCNC: 7 MMOL/L (ref 6–14)
BUN SERPL-MCNC: 33 MG/DL (ref 7–20)
CALCIUM SERPL-MCNC: 8.8 MG/DL (ref 8.5–10.1)
CHLORIDE SERPL-SCNC: 104 MMOL/L (ref 98–107)
CO2 SERPL-SCNC: 26 MMOL/L (ref 21–32)
CREAT SERPL-MCNC: 2.2 MG/DL (ref 0.6–1)
ERYTHROCYTE [DISTWIDTH] IN BLOOD BY AUTOMATED COUNT: 14 % (ref 11.5–14.5)
GFR SERPLBLD BASED ON 1.73 SQ M-ARVRAT: 24.6 ML/MIN
GLUCOSE SERPL-MCNC: 183 MG/DL (ref 70–99)
HCT VFR BLD CALC: 27 % (ref 36–47)
HGB BLD-MCNC: 8.7 G/DL (ref 12–15.5)
MCH RBC QN AUTO: 28 PG (ref 25–35)
MCHC RBC AUTO-ENTMCNC: 32 G/DL (ref 31–37)
MCV RBC AUTO: 87 FL (ref 79–100)
PLATELET # BLD AUTO: 310 X10^3/UL (ref 140–400)
POTASSIUM SERPL-SCNC: 4.7 MMOL/L (ref 3.5–5.1)
RBC # BLD AUTO: 3.1 X10^6/UL (ref 3.5–5.4)
SODIUM SERPL-SCNC: 137 MMOL/L (ref 136–145)
WBC # BLD AUTO: 8.3 X10^3/UL (ref 4–11)

## 2022-05-11 RX ADMIN — LABETALOL HCL SCH MG: 100 TABLET, FILM COATED ORAL at 07:47

## 2022-05-11 RX ADMIN — ASPIRIN SCH MG: 325 TABLET, DELAYED RELEASE ORAL at 07:46

## 2022-05-11 RX ADMIN — CLONIDINE SCH PATCH: 0.3 PATCH TRANSDERMAL at 07:46

## 2022-05-11 RX ADMIN — LABETALOL HCL SCH MG: 100 TABLET, FILM COATED ORAL at 21:26

## 2022-05-11 RX ADMIN — ONDANSETRON PRN MG: 4 TABLET, ORALLY DISINTEGRATING ORAL at 15:44

## 2022-05-11 RX ADMIN — MONTELUKAST SODIUM SCH MG: 10 TABLET, FILM COATED ORAL at 07:46

## 2022-05-11 RX ADMIN — ONDANSETRON PRN MG: 4 TABLET, ORALLY DISINTEGRATING ORAL at 00:23

## 2022-05-11 RX ADMIN — INSULIN LISPRO SCH UNITS: 100 INJECTION, SOLUTION INTRAVENOUS; SUBCUTANEOUS at 19:02

## 2022-05-11 RX ADMIN — INSULIN LISPRO SCH UNITS: 100 INJECTION, SOLUTION INTRAVENOUS; SUBCUTANEOUS at 11:59

## 2022-05-11 RX ADMIN — ONDANSETRON PRN MG: 4 TABLET, ORALLY DISINTEGRATING ORAL at 21:32

## 2022-05-11 RX ADMIN — ATORVASTATIN CALCIUM SCH MG: 40 TABLET, FILM COATED ORAL at 21:26

## 2022-05-11 RX ADMIN — ACETAMINOPHEN PRN MG: 325 TABLET, FILM COATED ORAL at 00:08

## 2022-05-11 RX ADMIN — INSULIN GLARGINE SCH UNIT: 100 INJECTION, SOLUTION SUBCUTANEOUS at 21:31

## 2022-05-11 RX ADMIN — INSULIN LISPRO SCH UNITS: 100 INJECTION, SOLUTION INTRAVENOUS; SUBCUTANEOUS at 07:48

## 2022-05-11 NOTE — PDOC
PROGRESS NOTES


Date of Service


DATE: 5/11/22 


TIME: 11:51


Assessment


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  Has left

facial numbness still, needs roller walker to ambulate


Hypertensive encephalopathy, blood pressure still running very high, could not 

participate in physical therapy yesterday


Acute-on-chronic kidney injury, had CT angiogram of chest at [facility].  

Started dialysis, now stopped


History of diabetes, hypertension, hyperlipidemia, noncompliant, asthma, sleep 

apnea


Echocardiogram noted, I do not think she needs a transesophageal echocardiogram,

the location of the stroke is unlikely cardiac-source


Plan


Aspirin


High-dose statin


Treatment of hypertension. Tightened parameters back to 150/90 starting 4/27


Rehabilitation modalities


I see that insurance company denied transfer to [facility] rehab, that would 

cut into their profits, so we are trying to place her in skilled nursing.


Subjective


Worried about her kidneys


Objective





Vital Signs








  Date Time  Temp Pulse Resp B/P (MAP) Pulse Ox O2 Delivery O2 Flow Rate FiO2


 


5/11/22 11:00 98.0 60 17 142/78 (99) 93 Room Air  





 98.0       














Intake and Output 


 


 5/11/22





 07:00


 


Intake Total 940 ml


 


Output Total 2100 ml


 


Balance -1160 ml


 


 


 


Intake Oral 940 ml


 


Output Urine Total 2100 ml








PHYSICAL EXAM


Alert. Oriented to time, place and person.


PERRL.


EOMI.


CN: Slight left cheek sensory loss, slight left central facial weakness, 

otherwise no focal findings.


Muscle tone: normal.


Muscle strength: 4/5


DTR: 2+


Plantar reflex: Flexor


Gait: not examined in bed.


Sensory exam: no abnormal findings.


No cerebellar signs elicited.


Review of Relevant


I have reviewed the following items lana (where applicable) has been applied.


Labs





Laboratory Tests








Test


 5/9/22


16:47 5/9/22


20:31 5/10/22


04:00 5/10/22


07:13


 


Glucose (Fingerstick)


 168 mg/dL


(70-99) 143 mg/dL


(70-99) 


 166 mg/dL


(70-99)


 


Sodium Level


 


 


 139 mmol/L


(136-145) 





 


Potassium Level


 


 


 4.6 mmol/L


(3.5-5.1) 





 


Chloride Level


 


 


 104 mmol/L


() 





 


Carbon Dioxide Level


 


 


 25 mmol/L


(21-32) 





 


Anion Gap   10 (6-14)  


 


Blood Urea Nitrogen


 


 


 29 mg/dL


(7-20) 





 


Creatinine


 


 


 2.3 mg/dL


(0.6-1.0) 





 


Estimated GFR


(Cockcroft-Gault) 


 


 23.4 


 





 


Glucose Level


 


 


 177 mg/dL


(70-99) 





 


Calcium Level


 


 


 8.7 mg/dL


(8.5-10.1) 





 


Test


 5/10/22


09:02 5/10/22


11:47 5/10/22


17:02 5/10/22


19:29


 


Coronavirus (COVID-19)(PCR)


 Not detected


(NOT DETECTD) 


 


 





 


Glucose (Fingerstick)


 


 133 mg/dL


(70-99) 148 mg/dL


(70-99) 119 mg/dL


(70-99)


 


Test


 5/11/22


05:15 5/11/22


07:27 5/11/22


10:43 





 


White Blood Count


 8.3 x10^3/uL


(4.0-11.0) 


 


 





 


Red Blood Count


 3.10 x10^6/uL


(3.50-5.40) 


 


 





 


Hemoglobin


 8.7 g/dL


(12.0-15.5) 


 


 





 


Hematocrit


 27.0 %


(36.0-47.0) 


 


 





 


Mean Corpuscular Volume 87 fL ()    


 


Mean Corpuscular Hemoglobin 28 pg (25-35)    


 


Mean Corpuscular Hemoglobin


Concent 32 g/dL


(31-37) 


 


 





 


Red Cell Distribution Width


 14.0 %


(11.5-14.5) 


 


 





 


Platelet Count


 310 x10^3/uL


(140-400) 


 


 





 


Sodium Level


 137 mmol/L


(136-145) 


 


 





 


Potassium Level


 4.7 mmol/L


(3.5-5.1) 


 


 





 


Chloride Level


 104 mmol/L


() 


 


 





 


Carbon Dioxide Level


 26 mmol/L


(21-32) 


 


 





 


Anion Gap 7 (6-14)    


 


Blood Urea Nitrogen


 33 mg/dL


(7-20) 


 


 





 


Creatinine


 2.2 mg/dL


(0.6-1.0) 


 


 





 


Estimated GFR


(Cockcroft-Gault) 24.6 


 


 


 





 


Glucose Level


 183 mg/dL


(70-99) 


 


 





 


Calcium Level


 8.8 mg/dL


(8.5-10.1) 


 


 





 


Glucose (Fingerstick)


 


 155 mg/dL


(70-99) 147 mg/dL


(70-99) 











Laboratory Tests








Test


 5/10/22


17:02 5/10/22


19:29 5/11/22


05:15 5/11/22


07:27


 


Glucose (Fingerstick)


 148 mg/dL


(70-99) 119 mg/dL


(70-99) 


 155 mg/dL


(70-99)


 


White Blood Count


 


 


 8.3 x10^3/uL


(4.0-11.0) 





 


Red Blood Count


 


 


 3.10 x10^6/uL


(3.50-5.40) 





 


Hemoglobin


 


 


 8.7 g/dL


(12.0-15.5) 





 


Hematocrit


 


 


 27.0 %


(36.0-47.0) 





 


Mean Corpuscular Volume   87 fL ()  


 


Mean Corpuscular Hemoglobin   28 pg (25-35)  


 


Mean Corpuscular Hemoglobin


Concent 


 


 32 g/dL


(31-37) 





 


Red Cell Distribution Width


 


 


 14.0 %


(11.5-14.5) 





 


Platelet Count


 


 


 310 x10^3/uL


(140-400) 





 


Sodium Level


 


 


 137 mmol/L


(136-145) 





 


Potassium Level


 


 


 4.7 mmol/L


(3.5-5.1) 





 


Chloride Level


 


 


 104 mmol/L


() 





 


Carbon Dioxide Level


 


 


 26 mmol/L


(21-32) 





 


Anion Gap   7 (6-14)  


 


Blood Urea Nitrogen


 


 


 33 mg/dL


(7-20) 





 


Creatinine


 


 


 2.2 mg/dL


(0.6-1.0) 





 


Estimated GFR


(Cockcroft-Gault) 


 


 24.6 


 





 


Glucose Level


 


 


 183 mg/dL


(70-99) 





 


Calcium Level


 


 


 8.8 mg/dL


(8.5-10.1) 





 


Test


 5/11/22


10:43 


 


 





 


Glucose (Fingerstick)


 147 mg/dL


(70-99) 


 


 











Medications





Current Medications


Nicardipine HCl 50 mg/Sodium Chloride 250 ml @  25 mls/hr CONT  PRN IV PER 

PROTOCOL Last administered on 4/25/22at 15:53;  Start 4/24/22 at 21:45;  Stop 

4/25/22 at 16:43;  Status DC


Nifedipine (Procardia Xl) 30 mg DAILY PO  Last administered on 4/28/22at 09:04; 

Start 4/25/22 at 09:00;  Stop 4/28/22 at 09:48;  Status DC


Zolpidem Tartrate (Ambien) 5 mg PRN QHS  PRN PO INSOMNIA Last administered on 

5/10/22at 20:58;  Start 4/24/22 at 21:45


Montelukast Sodium (Singulair) 10 mg DAILY PO  Last administered on 5/11/22at 

07:46;  Start 4/25/22 at 09:00


Insulin Human Lispro (HumaLOG) 15 units TIDWMEALS SQ  Last administered on 

4/25/22at 18:30;  Start 4/25/22 at 08:00;  Stop 4/26/22 at 08:47;  Status DC


Insulin Glargine (Lantus Syringe) 40 unit QHS SQ  Last administered on 5/9/22at 

20:40;  Start 4/25/22 at 21:00


Losartan Potassium (Cozaar) 100 mg DAILY PO  Last administered on 4/26/22at 

09:28;  Start 4/25/22 at 09:00;  Stop 4/26/22 at 10:31;  Status DC


Spironolactone (Aldactone) 50 mg DAILY PO  Last administered on 4/28/22at 09:03;

 Start 4/25/22 at 09:00;  Stop 4/28/22 at 09:48;  Status DC


Insulin Glargine (Lantus Syringe) 40 unit 1X  ONCE SQ  Last administered on 

4/24/22at 22:21;  Start 4/24/22 at 23:00;  Stop 4/24/22 at 23:01;  Status DC


Sodium Chloride 1,000 ml @  25 mls/hr Q24H IV  Last administered on 4/28/22at 

09:55;  Start 4/25/22 at 15:15;  Stop 4/30/22 at 14:30;  Status DC


Nicardipine HCl 50 mg/Sodium Chloride 250 ml @  25 mls/hr CONT PRN  PRN IV PER 

PROTOCOL Last administered on 4/29/22at 20:30;  Start 4/25/22 at 16:45;  Stop 

4/30/22 at 14:30;  Status DC


Atorvastatin Calcium (Lipitor) 80 mg QHS PO  Last administered on 5/10/22at 

20:51;  Start 4/25/22 at 21:00


Acetaminophen (Tylenol) 650 mg PRN Q6HRS  PRN PO MILD PAIN / TEMP > 100.3'F Last

administered on 5/11/22at 00:08;  Start 4/25/22 at 16:45


Aspirin (Ecotrin) 325 mg DAILYWBKFT PO  Last administered on 5/11/22at 07:46;  

Start 4/25/22 at 17:00


Aspirin (Aspirin Rectal Supp) 300 mg PRN DAILY  PRN OH IF UNABLE TO TAKE PO;  

Start 4/25/22 at 16:45


Insulin Human Lispro (HumaLOG) 20 units TIDWMEALS SQ  Last administered on 

5/11/22at 07:48;  Start 4/26/22 at 08:45


Ondansetron HCl (Zofran Odt) 4 mg PRN Q6HRS  PRN PO NAUSEA/VOMITING Last 

administered on 5/11/22at 00:23;  Start 4/26/22 at 15:00


Enoxaparin Sodium (Lovenox Per Pharmacy Prophylaxis Dosing) 1 each PRN DAILY  

PRN MC SEE COMMENTS;  Start 4/26/22 at 16:00;  Stop 4/28/22 at 11:43;  Status DC


Enoxaparin Sodium (Lovenox 60mg Syringe) 60 mg Q24H SQ  Last administered on 

4/26/22at 17:41;  Start 4/26/22 at 17:00;  Stop 4/28/22 at 11:36;  Status DC


Ondansetron HCl (Zofran) 4 mg PRN Q4HRS  PRN IVP NAUSEA/VOMITING Last 

administered on 5/1/22at 15:23;  Start 4/27/22 at 10:15


Sodium Chloride (Ayr Saline Nasal) 1 kelly PRN DAILY  PRN NS NASAL CONGESTION Last

administered on 4/28/22at 09:05;  Start 4/27/22 at 10:15


Lidocaine HCl (Buffered Lidocaine 1%) 3 ml STK-MED ONCE .ROUTE ;  Start 4/27/22 

at 11:30;  Stop 4/27/22 at 11:30;  Status DC


Lidocaine HCl (Buffered Lidocaine 1%) 6 ml 1X  ONCE INJ ;  Start 4/27/22 at 

12:15;  Stop 4/27/22 at 12:16;  Status DC


Sodium Chloride 1,000 ml @  1,000 mls/hr Q1H PRN IV hypotension;  Start 4/28/22 

at 08:30;  Stop 4/28/22 at 14:29;  Status DC


Sodium Chloride 1,000 ml @  400 mls/hr Q2H30M PRN IV PATENCY;  Start 4/28/22 at 

08:30;  Stop 4/28/22 at 20:29;  Status DC


Info (PHARMACY MONITORING -- do not chart) 1 each PRN DAILY  PRN MC SEE 

COMMENTS;  Start 4/28/22 at 08:30


Info (PHARMACY MONITORING -- do not chart) 1 each PRN DAILY  PRN MC SEE COMME

NTS;  Start 4/28/22 at 08:30;  Stop 4/28/22 at 08:27;  Status DC


Nifedipine (Procardia Xl) 60 mg DAILY PO  Last administered on 4/28/22at 15:53; 

Start 4/28/22 at 10:00;  Stop 4/29/22 at 07:15;  Status DC


Heparin Sodium (Porcine) (Heparin Sodium) 5,000 unit Q8HRS SQ  Last administered

on 4/29/22at 06:27;  Start 4/28/22 at 14:00;  Stop 4/29/22 at 08:49;  Status DC


Nifedipine (Procardia Xl) 90 mg DAILY PO  Last administered on 4/29/22at 08:26; 

Start 4/29/22 at 09:00;  Stop 4/29/22 at 09:12;  Status DC


Nifedipine (Procardia Xl) 120 mg DAILY PO  Last administered on 5/11/22at 07:46;

 Start 4/29/22 at 09:15


Sodium Chloride 1,000 ml @  1,000 mls/hr Q1H PRN IV hypotension;  Start 4/30/22 

at 10:45;  Stop 4/30/22 at 16:44;  Status DC


Sodium Chloride 1,000 ml @  400 mls/hr Q2H30M PRN IV PATENCY;  Start 4/30/22 at 

10:45;  Stop 4/30/22 at 22:44;  Status DC


Info (PHARMACY MONITORING -- do not chart) 1 each PRN DAILY  PRN MC SEE 

COMMENTS;  Start 4/30/22 at 10:45;  Status UNV


Hydralazine HCl (Apresoline) 25 mg TID PO  Last administered on 5/3/22at 07:57; 

Start 5/2/22 at 10:30;  Stop 5/3/22 at 08:51;  Status DC


Hydralazine HCl (Apresoline) 50 mg TID PO  Last administered on 5/4/22at 08:34; 

Start 5/3/22 at 09:00;  Stop 5/4/22 at 10:20;  Status DC


Furosemide (Lasix) 40 mg 1X  ONCE IVP  Last administered on 5/3/22at 14:42;  

Start 5/3/22 at 14:45;  Stop 5/3/22 at 14:46;  Status DC


Hydralazine HCl (Apresoline) 100 mg TID PO  Last administered on 5/11/22at 

07:45;  Start 5/4/22 at 10:30


Clonidine HCl (Catapres Tts-3) 1 patch WEEKLY TD  Last administered on 5/11/22at

07:46;  Start 5/4/22 at 15:30


Furosemide (Lasix) 40 mg 1X  ONCE IVP  Last administered on 5/5/22at 11:04;  

Start 5/5/22 at 09:45;  Stop 5/5/22 at 09:46;  Status DC


Labetalol HCl (Trandate) 100 mg BID PO  Last administered on 5/11/22at 07:47;  

Start 5/6/22 at 13:00





Active Scripts


Active


Ambien (Zolpidem Tartrate) 5 Mg Tablet 5 Mg PO PRN QHS PRN 30 Days


Levemir Flextouch (Insulin Detemir) 100 Unit/1 Ml Insuln.pen 40 Units SQ QHS 30 

Days


Novolog Flexpen (Insulin Aspart) 100 Unit/1 Ml Insuln.pen 15 Units SQ TIDAC 30 

Days


Reported


Tri-Sprintec (Norgestimate-Ethinyl Estradiol) 1 Each Tablet 1 Tab PO DAILY


Spironolactone 50 Mg Tablet 1 Tab PO DAILY


Montelukast Sodium Tablet  ** (Montelukast Sodium) 10 Mg Tablet 10 Mg PO DAILY


Losartan Potassium 100 Mg Tablet 100 Mg PO DAILY


Vitals/I & O





Vital Sign - Last 24 Hours








 5/10/22 5/10/22 5/10/22 5/10/22





 13:29 15:00 19:28 20:00


 


Temp  98.0 98.0 





  98.0 98.0 


 


Pulse 84 87 89 


 


Resp  17 17 


 


B/P (MAP) 136/68 141/75 (97) 147/73 (97) 


 


Pulse Ox  92 92 


 


O2 Delivery  Room Air Room Air Room Air


 


    





    





 5/10/22 5/10/22 5/10/22 5/11/22





 20:50 20:51 22:24 02:29


 


Temp   98.2 98.1





   98.2 98.1


 


Pulse 89 89 90 85


 


Resp   17 17


 


B/P (MAP) 159/85 159/85 159/87 (111) 155/80 (105)


 


Pulse Ox   93 93


 


O2 Delivery   Room Air Room Air


 


    





    





 5/11/22 5/11/22 5/11/22 5/11/22





 07:00 07:45 07:46 07:47


 


Temp 98.2   





 98.2   


 


Pulse 88 85 85 85


 


Resp 17   


 


B/P (MAP) 136/66 (89) 136/76 136/72 136/72


 


Pulse Ox 94   


 


O2 Delivery Room Air   


 


    





    





 5/11/22 5/11/22  





 08:00 11:00  


 


Temp  98.0  





  98.0  


 


Pulse  60  


 


Resp  17  


 


B/P (MAP)  142/78 (99)  


 


Pulse Ox  93  


 


O2 Delivery Room Air Room Air  














Intake and Output   


 


 5/10/22 5/10/22 5/11/22





 15:00 23:00 07:00


 


Intake Total 600 ml 120 ml 220 ml


 


Output Total 500 ml 700 ml 900 ml


 


Balance 100 ml -580 ml -680 ml











Justicifation of Admission Dx:


Justifications for Admission:


Justification of Admission Dx:  Yes


Stroke - Ischemic:  Stroke-Ischemic











ANGÉLICA MOYER MD           May 11, 2022 11:52

## 2022-05-11 NOTE — SNU/HH DC
DISCHARGE ORDERS


DISCHARGE INFORMATION:


DISCHARGE DATE:  May 11, 2022


FINAL DIAGNOSIS


hypertensive encephalopathy


acute on chronic kidney injury


CONDITION ON DISCHARGE:  Stable





CODE STATUS:


Code Status:  Full





SKILLED NURSING:


SNF STAY <30 DAYS:  Yes





POST DISCHARGE ORDERS:


ACTIVITY ORDERS:  Activity as tolerated


WEIGHT BEARING STATUS:  As tolerated


DIET AFTER DISCHARGE:  ADA





TREATMENT/EQUIPMENT ORDERS:


Physical Therapy For:  Evalulation/Treatment


Occupational Therapy For:  Evaluation/Treatment





DISCHARGE MEDICATIONS:


Home Meds


Active Scripts


Zolpidem Tartrate (AMBIEN) 5 Mg Tablet, 5 MG PO PRN QHS PRN for INSOMNIA for 30 

Days, #30 TAB 0 Refills


   Prov:BRIDGETT FLORES MD         5/11/22


Insulin Detemir (Levemir Flextouch) 100 Unit/1 Ml Insuln.pen, 40 UNITS SQ QHS 

for 30 Days, EACH


   Prov:OLLIE DEWEY MD         8/3/17


Insulin Aspart (NOVOLOG FLEXPEN) 100 Unit/1 Ml Insuln.pen, 15 UNITS SQ TIDAC for

30 Days, EACH


   Prov:OLLIE DEWEY MD         8/3/17


Reported Medications


Norgestimate-Ethinyl Estradiol (TRI-SPRINTEC) 1 Each Tablet, 1 TAB PO DAILY for 

menstrual cycle, #28 TAB 11 Refills


   4/24/22


Spironolactone (SPIRONOLACTONE) 50 Mg Tablet, 1 TAB PO DAILY for HTN, #30 TAB 5 

Refills


   4/24/22


Montelukast Sodium (MONTELUKAST SODIUM TABLET  **) 10 Mg Tablet, 10 MG PO DAILY 

for FOR ASTHMA, TAB 0 Refills


   4/24/22


Losartan Potassium (LOSARTAN POTASSIUM) 100 Mg Tablet, 100 MG PO DAILY for 

HYPERTENSION, TAB


   4/24/22











BRIDGETT FLORES MD                May 11, 2022 07:47

## 2022-05-11 NOTE — PDOC
DATE OF SERVICE


DATE: 5/11/22 


TIME: 10:10





SUBJECTIVE


ROS


Denies SOB  ,  No N/V. same complaint of feeling tired





OBJECTIVE


Vital Signs





Vital Signs








  Date Time  Temp Pulse Resp B/P (MAP) Pulse Ox O2 Delivery O2 Flow Rate FiO2


 


5/11/22 08:00      Room Air  


 


5/11/22 07:47  85  136/72    


 


5/11/22 07:00 98.2  17  94   





 98.2       








I & 0











Intake and Output 


 


 5/11/22





 07:00


 


Intake Total 940 ml


 


Output Total 2100 ml


 


Balance -1160 ml


 


 


 


Intake Oral 940 ml


 


Output Urine Total 2100 ml











PHYSICAL EXAM


Physical Exam


GENERAL:  Morbidly Obese ,  


HEEN  Normocephalic, atraumatic 


NECK:  Supple.


HEART:   normal first and second heart sounds.  No gallop, rub or murmur.


LUNGS :  Clear to auscultation, decreased at bases, non labored  


ABDOMEN:  Distended, soft, nontender.


NEUROLOGIC:  Grossly Normal, Moves all 4 extremities .  She did complain of 

numbness in her left side of the face without any obvious motor deficit.


PSYCH COOPERATIVE 


EXT  LE  edema  + 


  Malhotra + , No CVA or SP tenderness  


DERM No Rash








DIAGNOSIS/ASSESSMENT


Assessment & Plan





MICHELLE -atn   / Vomiting  /Poor po intake/ IV Contrast / Hx of NSAID use   . 

Baseline Cr normal in 2017 per [facility] records. No Interval labs available  .UA 

unremarkable, US Unremarkable, 


Dialysis on 4/28  ,2 nd treatment  4/30 , no HD since.    .  Creatinine stable, 

 . Supportive care    Strict I/O  , Daily standing weight , If Poor PO intake  

consider IVF    . DC Temp HDc prior to dc (Currently used by nursing as central 

line) 


 follow up with PCP closely and with Nephrology  as OP  





CKD - per Primary's note, No interval labs, dont know her baseline  . Suspect 

CKD 2/2 DM and Uncontrolled HTN 





HTN Urgency   -   multiple antihypertensive medications at home . Renal Duplex 

Normal   . Can add Labetalol . Dw Dr Chao  .





Hypertensive encephalopathy, also has a small left lateral liset infarct 

extending to the left brachium pontis- NEUROLOGY FOLLOWING  . 





Type 2 Diabetes mellitus. she stopped Insulin on her own recently  





Bronchial asthma





COMMENT/RELEVANT DATA


Meds





Current Medications








 Medications


  (Trade)  Dose


 Ordered  Sig/Kavitha  Start Time


 Stop Time Status Last Admin


Dose Admin


 


 Acetaminophen


  (Tylenol)  650 mg  PRN Q6HRS  PRN  4/25/22 16:45


    5/11/22 00:08


650 MG


 


 Aspirin


  (Aspirin Rectal


 Supp)  300 mg  PRN DAILY  PRN  4/25/22 16:45


     





 


 Aspirin


  (Ecotrin)  325 mg  DAILYWBKFT  4/25/22 17:00


    5/11/22 07:46


325 MG


 


 Atorvastatin


 Calcium


  (Lipitor)  80 mg  QHS  4/25/22 21:00


    5/10/22 20:51


80 MG


 


 Clonidine HCl


  (Catapres Tts-3)  1 patch  WEEKLY  5/4/22 15:30


    5/11/22 07:46


1 PATCH


 


 Enoxaparin Sodium


  (Lovenox 60mg


 Syringe)  60 mg  Q24H  4/26/22 17:00


 4/28/22 11:36 DC 4/26/22 17:41


60 MG


 


 Enoxaparin Sodium


  (Lovenox Per


 Pharmacy


 Prophylaxis


 Dosing)  1 each  PRN DAILY  PRN  4/26/22 16:00


 4/28/22 11:43 DC  





 


 Furosemide


  (Lasix)  40 mg  1X  ONCE  5/5/22 09:45


 5/5/22 09:46 DC 5/5/22 11:04


40 MG


 


 Heparin Sodium


  (Porcine)


  (Heparin Sodium)  5,000 unit  Q8HRS  4/28/22 14:00


 4/29/22 08:49 DC 4/29/22 06:27


5,000 UNIT


 


 Hydralazine HCl


  (Apresoline)  100 mg  TID  5/4/22 10:30


    5/11/22 07:45


100 MG


 


 Info


  (PHARMACY


 MONITORING -- do


 not chart)  1 each  PRN DAILY  PRN  4/30/22 10:45


   UNV  





 


 Insulin Glargine


  (Lantus Syringe)  40 unit  1X  ONCE  4/24/22 23:00


 4/24/22 23:01 DC 4/24/22 22:21


40 UNIT


 


 Insulin Human


 Lispro


  (HumaLOG)  20 units  TIDWMEALS  4/26/22 08:45


    5/11/22 07:48


20 UNITS


 


 Labetalol HCl


  (Trandate)  100 mg  BID  5/6/22 13:00


    5/11/22 07:47


100 MG


 


 Lidocaine HCl


  (Buffered


 Lidocaine 1%)  6 ml  1X  ONCE  4/27/22 12:15


 4/27/22 12:16 DC  





 


 Losartan Potassium


  (Cozaar)  100 mg  DAILY  4/25/22 09:00


 4/26/22 10:31 DC 4/26/22 09:28


100 MG


 


 Montelukast Sodium


  (Singulair)  10 mg  DAILY  4/25/22 09:00


    5/11/22 07:46


10 MG


 


 Nicardipine HCl


 50 mg/Sodium


 Chloride  250 ml @ 


 25 mls/hr  CONT PRN  PRN  4/25/22 16:45


 4/30/22 14:30 DC 4/29/22 20:30


37.5 MLS/HR


 


 Nifedipine


  (Procardia Xl)  120 mg  DAILY  4/29/22 09:15


    5/11/22 07:46


120 MG


 


 Ondansetron HCl


  (Zofran Odt)  4 mg  PRN Q6HRS  PRN  4/26/22 15:00


    5/11/22 00:23


4 MG


 


 Ondansetron HCl


  (Zofran)  4 mg  PRN Q4HRS  PRN  4/27/22 10:15


    5/1/22 15:23


4 MG


 


 Sodium Chloride  1,000 ml @ 


 400 mls/hr  Q2H30M PRN  4/30/22 10:45


 4/30/22 22:44 DC  





 


 Sodium Chloride


  (Ayr Saline


 Nasal)  1 kelly  PRN DAILY  PRN  4/27/22 10:15


    4/28/22 09:05


1 KELLY


 


 Spironolactone


  (Aldactone)  50 mg  DAILY  4/25/22 09:00


 4/28/22 09:48 DC 4/28/22 09:03


50 MG


 


 Zolpidem Tartrate


  (Ambien)  5 mg  PRN QHS  PRN  4/24/22 21:45


    5/10/22 20:58


5 MG








Lab





Laboratory Tests








Test


 5/10/22


11:47 5/10/22


17:02 5/10/22


19:29 5/11/22


05:15


 


Glucose (Fingerstick)


 133 mg/dL


(70-99) 148 mg/dL


(70-99) 119 mg/dL


(70-99) 





 


White Blood Count


 


 


 


 8.3 x10^3/uL


(4.0-11.0)


 


Red Blood Count


 


 


 


 3.10 x10^6/uL


(3.50-5.40)


 


Hemoglobin


 


 


 


 8.7 g/dL


(12.0-15.5)


 


Hematocrit


 


 


 


 27.0 %


(36.0-47.0)


 


Mean Corpuscular Volume    87 fL () 


 


Mean Corpuscular Hemoglobin    28 pg (25-35) 


 


Mean Corpuscular Hemoglobin


Concent 


 


 


 32 g/dL


(31-37)


 


Red Cell Distribution Width


 


 


 


 14.0 %


(11.5-14.5)


 


Platelet Count


 


 


 


 310 x10^3/uL


(140-400)


 


Sodium Level


 


 


 


 137 mmol/L


(136-145)


 


Potassium Level


 


 


 


 4.7 mmol/L


(3.5-5.1)


 


Chloride Level


 


 


 


 104 mmol/L


()


 


Carbon Dioxide Level


 


 


 


 26 mmol/L


(21-32)


 


Anion Gap    7 (6-14) 


 


Blood Urea Nitrogen


 


 


 


 33 mg/dL


(7-20)


 


Creatinine


 


 


 


 2.2 mg/dL


(0.6-1.0)


 


Estimated GFR


(Cockcroft-Gault) 


 


 


 24.6 





 


Glucose Level


 


 


 


 183 mg/dL


(70-99)


 


Calcium Level


 


 


 


 8.8 mg/dL


(8.5-10.1)


 


Test


 5/11/22


07:27 


 


 





 


Glucose (Fingerstick)


 155 mg/dL


(70-99) 


 


 











Results


All relevant outside records, renal labs, imaging studies, telemetry/EKG's were 

reviewed.





Justicifation of Admission Dx:


Justifications for Admission:


Justification of Admission Dx:  Yes


Stroke - Ischemic:  Stroke-Ischemic











RAMESH LITTLEJOHN MD                May 11, 2022 10:12

## 2022-05-11 NOTE — NUR
SS following up with discharge planning. SS reviewed pt chart and discussed with pt RN. Pt 
is currently on room air. PT/OT recommended inpatient rehabilitation. Pt accepted at 
[facility], [phone]; fax [phone], pending insurance approval. COVID19 
negative. Currently awaiting insurance approval. SS will continue to follow for discharge 
planning. 

-------------------------------------------------------------------------------

Addendum: 05/11/22 at 1458 by SALOME ORDONEZ SS

-------------------------------------------------------------------------------

Insurance authorization received for [facility]. [facility] reported that they 
can admit in the morning. Physician and RN notified.

## 2022-05-12 VITALS — SYSTOLIC BLOOD PRESSURE: 134 MMHG | DIASTOLIC BLOOD PRESSURE: 62 MMHG

## 2022-05-12 VITALS — DIASTOLIC BLOOD PRESSURE: 78 MMHG | SYSTOLIC BLOOD PRESSURE: 131 MMHG

## 2022-05-12 VITALS — DIASTOLIC BLOOD PRESSURE: 62 MMHG | SYSTOLIC BLOOD PRESSURE: 134 MMHG

## 2022-05-12 LAB
ANION GAP SERPL CALC-SCNC: 8 MMOL/L (ref 6–14)
BUN SERPL-MCNC: 34 MG/DL (ref 7–20)
CALCIUM SERPL-MCNC: 8.8 MG/DL (ref 8.5–10.1)
CHLORIDE SERPL-SCNC: 106 MMOL/L (ref 98–107)
CO2 SERPL-SCNC: 26 MMOL/L (ref 21–32)
CREAT SERPL-MCNC: 2.2 MG/DL (ref 0.6–1)
GFR SERPLBLD BASED ON 1.73 SQ M-ARVRAT: 24.6 ML/MIN
GLUCOSE SERPL-MCNC: 167 MG/DL (ref 70–99)
POTASSIUM SERPL-SCNC: 4.6 MMOL/L (ref 3.5–5.1)
SODIUM SERPL-SCNC: 140 MMOL/L (ref 136–145)

## 2022-05-12 RX ADMIN — ASPIRIN SCH MG: 325 TABLET, DELAYED RELEASE ORAL at 07:43

## 2022-05-12 RX ADMIN — MONTELUKAST SODIUM SCH MG: 10 TABLET, FILM COATED ORAL at 07:44

## 2022-05-12 RX ADMIN — LABETALOL HCL SCH MG: 100 TABLET, FILM COATED ORAL at 07:44

## 2022-05-12 RX ADMIN — INSULIN LISPRO SCH UNITS: 100 INJECTION, SOLUTION INTRAVENOUS; SUBCUTANEOUS at 07:47

## 2022-05-12 NOTE — PDOC
DATE OF SERVICE


DATE: 5/12/22 


TIME: 09:54





SUBJECTIVE


ROS


Denies SOB  ,  No N/V.





OBJECTIVE


Vital Signs





Vital Signs








  Date Time  Temp Pulse Resp B/P (MAP) Pulse Ox O2 Delivery O2 Flow Rate FiO2


 


5/12/22 08:00      Room Air  


 


5/12/22 07:44  87  134/62    


 


5/12/22 07:00 99.5  17  97   





 99.5       








I & 0











Intake and Output 


 


 5/12/22





 07:00


 


Intake Total 1200 ml


 


Output Total 1200 ml


 


Balance 0 ml


 


 


 


Intake Oral 1200 ml


 


Output Urine Total 1200 ml











PHYSICAL EXAM


Physical Exam


GENERAL:  Morbidly Obese ,  


HEEN  Normocephalic, atraumatic 


NECK:  Supple.


HEART:   normal first and second heart sounds.  No gallop, rub or murmur.


LUNGS :  Clear to auscultation, decreased at bases, non labored  


ABDOMEN:  Distended, soft, nontender.


NEUROLOGIC:  Grossly Normal, Moves all 4 extremities .  She did complain of num

bness in her left side of the face without any obvious motor deficit.


PSYCH COOPERATIVE 


EXT  LE  edema  + 


  Malhotra + , No CVA or SP tenderness  


DERM No Rash








DIAGNOSIS/ASSESSMENT


Assessment & Plan





MICHELLE -atn   / Vomiting  /Poor po intake/ IV Contrast / Hx of NSAID use   . 

Baseline Cr normal in 2017 per [facility] records. No Interval labs available  .UA 

unremarkable, US Unremarkable, 


Dialysis on 4/28  ,2 nd treatment  4/30 , no HD since.    .  Creatinine stable, 

 . Supportive care    Strict I/O  , Daily standing weight . DC Temp HDc prior to

dc (Currently used by nursing as central line) 


 follow up with PCP closely and with Nephrology  as OP  





CKD - per Primary's note, No interval labs, dont know her baseline  . Suspect 

CKD 2/2 DM and Uncontrolled HTN 





HTN Urgency   -   multiple antihypertensive medications at home . Renal Duplex 

Normal   . Can add Labetalol . Dw Dr Chao  .





Hypertensive encephalopathy, also has a small left lateral liset infarct 

extending to the left brachium pontis- NEUROLOGY FOLLOWING  . 





Type 2 Diabetes mellitus. she stopped Insulin on her own recently  





Bronchial asthma





COMMENT/RELEVANT DATA


Meds





Current Medications








 Medications


  (Trade)  Dose


 Ordered  Sig/Kavitha  Start Time


 Stop Time Status Last Admin


Dose Admin


 


 Acetaminophen


  (Tylenol)  650 mg  PRN Q6HRS  PRN  4/25/22 16:45


    5/11/22 00:08


650 MG


 


 Aspirin


  (Aspirin Rectal


 Supp)  300 mg  PRN DAILY  PRN  4/25/22 16:45


     





 


 Aspirin


  (Ecotrin)  325 mg  DAILYWBKFT  4/25/22 17:00


    5/12/22 07:43


325 MG


 


 Atorvastatin


 Calcium


  (Lipitor)  80 mg  QHS  4/25/22 21:00


    5/11/22 21:26


80 MG


 


 Clonidine HCl


  (Catapres Tts-3)  1 patch  WEEKLY  5/4/22 15:30


    5/11/22 07:46


1 PATCH


 


 Enoxaparin Sodium


  (Lovenox 60mg


 Syringe)  60 mg  Q24H  4/26/22 17:00


 4/28/22 11:36 DC 4/26/22 17:41


60 MG


 


 Enoxaparin Sodium


  (Lovenox Per


 Pharmacy


 Prophylaxis


 Dosing)  1 each  PRN DAILY  PRN  4/26/22 16:00


 4/28/22 11:43 DC  





 


 Furosemide


  (Lasix)  40 mg  1X  ONCE  5/5/22 09:45


 5/5/22 09:46 DC 5/5/22 11:04


40 MG


 


 Heparin Sodium


  (Porcine)


  (Heparin Sodium)  5,000 unit  Q8HRS  4/28/22 14:00


 4/29/22 08:49 DC 4/29/22 06:27


5,000 UNIT


 


 Hydralazine HCl


  (Apresoline)  100 mg  TID  5/4/22 10:30


    5/12/22 07:44


100 MG


 


 Info


  (PHARMACY


 MONITORING -- do


 not chart)  1 each  PRN DAILY  PRN  4/30/22 10:45


   UNV  





 


 Insulin Glargine


  (Lantus Syringe)  40 unit  1X  ONCE  4/24/22 23:00


 4/24/22 23:01 DC 4/24/22 22:21


40 UNIT


 


 Insulin Human


 Lispro


  (HumaLOG)  20 units  TIDWMEALS  4/26/22 08:45


    5/12/22 07:47


20 UNITS


 


 Labetalol HCl


  (Trandate)  100 mg  BID  5/6/22 13:00


    5/12/22 07:44


100 MG


 


 Lidocaine HCl


  (Buffered


 Lidocaine 1%)  6 ml  1X  ONCE  4/27/22 12:15


 4/27/22 12:16 DC  





 


 Losartan Potassium


  (Cozaar)  100 mg  DAILY  4/25/22 09:00


 4/26/22 10:31 DC 4/26/22 09:28


100 MG


 


 Montelukast Sodium


  (Singulair)  10 mg  DAILY  4/25/22 09:00


    5/12/22 07:44


10 MG


 


 Nicardipine HCl


 50 mg/Sodium


 Chloride  250 ml @ 


 25 mls/hr  CONT PRN  PRN  4/25/22 16:45


 4/30/22 14:30 DC 4/29/22 20:30


37.5 MLS/HR


 


 Nifedipine


  (Procardia Xl)  120 mg  DAILY  4/29/22 09:15


    5/12/22 07:43


120 MG


 


 Ondansetron HCl


  (Zofran Odt)  4 mg  PRN Q6HRS  PRN  4/26/22 15:00


    5/11/22 21:32


4 MG


 


 Ondansetron HCl


  (Zofran)  4 mg  PRN Q4HRS  PRN  4/27/22 10:15


    5/1/22 15:23


4 MG


 


 Sodium Chloride  1,000 ml @ 


 400 mls/hr  Q2H30M PRN  4/30/22 10:45


 4/30/22 22:44 DC  





 


 Sodium Chloride


  (Ayr Saline


 Nasal)  1 kelly  PRN DAILY  PRN  4/27/22 10:15


    4/28/22 09:05


1 KELLY


 


 Spironolactone


  (Aldactone)  50 mg  DAILY  4/25/22 09:00


 4/28/22 09:48 DC 4/28/22 09:03


50 MG


 


 Zolpidem Tartrate


  (Ambien)  5 mg  PRN QHS  PRN  4/24/22 21:45


    5/10/22 20:58


5 MG








Lab





Laboratory Tests








Test


 5/11/22


10:43 5/11/22


16:48 5/11/22


20:27 5/12/22


05:15


 


Glucose (Fingerstick)


 147 mg/dL


(70-99) 142 mg/dL


(70-99) 175 mg/dL


(70-99) 





 


Sodium Level


 


 


 


 140 mmol/L


(136-145)


 


Potassium Level


 


 


 


 4.6 mmol/L


(3.5-5.1)


 


Chloride Level


 


 


 


 106 mmol/L


()


 


Carbon Dioxide Level


 


 


 


 26 mmol/L


(21-32)


 


Anion Gap    8 (6-14) 


 


Blood Urea Nitrogen


 


 


 


 34 mg/dL


(7-20)


 


Creatinine


 


 


 


 2.2 mg/dL


(0.6-1.0)


 


Estimated GFR


(Cockcroft-Gault) 


 


 


 24.6 





 


Glucose Level


 


 


 


 167 mg/dL


(70-99)


 


Calcium Level


 


 


 


 8.8 mg/dL


(8.5-10.1)


 


Test


 5/12/22


07:08 


 


 





 


Glucose (Fingerstick)


 146 mg/dL


(70-99) 


 


 











Results


All relevant outside records, renal labs, imaging studies, telemetry/EKG's were 

reviewed.





Justicifation of Admission Dx:


Justifications for Admission:


Justification of Admission Dx:  Yes


Stroke - Ischemic:  Stroke-Ischemic











RAMESH LITTLEJOHN MD                May 12, 2022 09:55

## 2022-05-12 NOTE — NUR
Discharge Note:



RICARDO MOORE [address]



Discharge instructions and discharge home medications reviewed with Other facility and a 
copy given. All questions have been answered and understanding verbalized. 



The following instructions and handouts were given: Packet and report 



Discontinued lines and drains: Trialysis cath and heart monitor removed



Patient discharged to [facility]